# Patient Record
Sex: MALE | Race: WHITE | Employment: OTHER | URBAN - NONMETROPOLITAN AREA
[De-identification: names, ages, dates, MRNs, and addresses within clinical notes are randomized per-mention and may not be internally consistent; named-entity substitution may affect disease eponyms.]

---

## 2017-02-20 ENCOUNTER — TELEPHONE (OUTPATIENT)
Dept: UROLOGY | Age: 67
End: 2017-02-20

## 2017-02-22 ENCOUNTER — HOSPITAL ENCOUNTER (OUTPATIENT)
Dept: CT IMAGING | Age: 67
Discharge: HOME | End: 2017-02-22
Payer: MEDICARE

## 2017-02-22 DIAGNOSIS — N20.0 KIDNEY STONE: ICD-10-CM

## 2017-02-22 DIAGNOSIS — N28.1 RENAL CYST, ACQUIRED: ICD-10-CM

## 2017-02-22 LAB
GFR NON-AFRICAN AMERICAN: >60
PERFORMED ON: NORMAL
POC CREATININE: 1.1 MG/DL (ref 0.3–1.3)
POC SAMPLE TYPE: NORMAL

## 2017-02-22 PROCEDURE — 74178 CT ABD&PLV WO CNTR FLWD CNTR: CPT

## 2017-02-22 PROCEDURE — 6360000004 HC RX CONTRAST MEDICATION: Performed by: UROLOGY

## 2017-02-22 PROCEDURE — 82565 ASSAY OF CREATININE: CPT

## 2017-02-22 RX ADMIN — IOPAMIDOL 75 ML: 755 INJECTION, SOLUTION INTRAVENOUS at 09:30

## 2017-03-01 ENCOUNTER — OFFICE VISIT (OUTPATIENT)
Dept: UROLOGY | Age: 67
End: 2017-03-01
Payer: MEDICARE

## 2017-03-01 VITALS
OXYGEN SATURATION: 96 % | TEMPERATURE: 98.1 F | WEIGHT: 218 LBS | HEART RATE: 78 BPM | SYSTOLIC BLOOD PRESSURE: 126 MMHG | DIASTOLIC BLOOD PRESSURE: 66 MMHG | BODY MASS INDEX: 29.53 KG/M2 | HEIGHT: 72 IN

## 2017-03-01 DIAGNOSIS — N28.1 HYPERDENSE RENAL CYST: ICD-10-CM

## 2017-03-01 DIAGNOSIS — N40.0 BENIGN NON-NODULAR PROSTATIC HYPERPLASIA WITHOUT LOWER URINARY TRACT SYMPTOMS: ICD-10-CM

## 2017-03-01 DIAGNOSIS — N28.1 RENAL CYSTS, ACQUIRED, BILATERAL: Primary | ICD-10-CM

## 2017-03-01 LAB — PROSTATE SPECIFIC ANTIGEN: 2.09 NG/ML (ref 0–4)

## 2017-03-01 PROCEDURE — 99214 OFFICE O/P EST MOD 30 MIN: CPT | Performed by: UROLOGY

## 2017-03-01 ASSESSMENT — ENCOUNTER SYMPTOMS
SORE THROAT: 0
NAUSEA: 0
HEARTBURN: 0
BLURRED VISION: 0
SHORTNESS OF BREATH: 0
DOUBLE VISION: 0
WHEEZING: 0

## 2017-03-10 ENCOUNTER — OFFICE VISIT (OUTPATIENT)
Dept: RETAIL CLINIC | Facility: CLINIC | Age: 67
End: 2017-03-10

## 2017-03-10 VITALS
WEIGHT: 218 LBS | TEMPERATURE: 98.8 F | RESPIRATION RATE: 20 BRPM | SYSTOLIC BLOOD PRESSURE: 116 MMHG | HEART RATE: 78 BPM | OXYGEN SATURATION: 99 % | DIASTOLIC BLOOD PRESSURE: 75 MMHG | BODY MASS INDEX: 29.53 KG/M2 | HEIGHT: 72 IN

## 2017-03-10 DIAGNOSIS — J01.40 ACUTE PANSINUSITIS, RECURRENCE NOT SPECIFIED: Primary | ICD-10-CM

## 2017-03-10 PROCEDURE — 99203 OFFICE O/P NEW LOW 30 MIN: CPT | Performed by: NURSE PRACTITIONER

## 2017-03-10 RX ORDER — BROMPHENIRAMINE MALEATE, PSEUDOEPHEDRINE HYDROCHLORIDE, AND DEXTROMETHORPHAN HYDROBROMIDE 2; 30; 10 MG/5ML; MG/5ML; MG/5ML
5 SYRUP ORAL 4 TIMES DAILY PRN
Qty: 118 ML | Refills: 0 | Status: SHIPPED | OUTPATIENT
Start: 2017-03-10 | End: 2018-08-27

## 2017-03-10 RX ORDER — AMOXICILLIN AND CLAVULANATE POTASSIUM 875; 125 MG/1; MG/1
1 TABLET, FILM COATED ORAL 2 TIMES DAILY
Qty: 20 TABLET | Refills: 0 | Status: SHIPPED | OUTPATIENT
Start: 2017-03-10 | End: 2017-03-20

## 2017-03-10 NOTE — PROGRESS NOTES
"  Chief Complaint   Patient presents with   • Sinusitis   • Cough     Subjective   Rasheed Arevalo is a 66 y.o. male who presents to the clinic today with complaints sinus symptoms, cough and sore throat since Sunday. He has been taking Benadryl, Robitussin and Flonase. He uses CPAP which usually \"keeps me open.\" His throat is sore in am and when he is coughing. His cough is productive with dark green secretions. He reports no wheezing or shortness or breath, but he has been sedentary. He had fever on Monday night low grade of 99-99.3. He has significant fatigue.       The following portions of the patient's history were reviewed and updated as appropriate: allergies, past family history, past medical history, past social history, past surgical history and problem list.      Current Outpatient Prescriptions:   •  Nutritional Supplements (NUTRITIONAL SUPPLEMENT PO), Take  by mouth., Disp: , Rfl:   •  amoxicillin-clavulanate (AUGMENTIN) 875-125 MG per tablet, Take 1 tablet by mouth 2 (Two) Times a Day for 10 days., Disp: 20 tablet, Rfl: 0  •  brompheniramine-pseudoephedrine-DM 30-2-10 MG/5ML syrup, Take 5 mL by mouth 4 (Four) Times a Day As Needed for congestion, cough or Allergies., Disp: 118 mL, Rfl: 0  Allergies:  Review of patient's allergies indicates no known allergies.  Past Medical History   Diagnosis Date   • Gallbladder abscess    • Kidney stone    • Pneumonia 2015   • Sinusitis      Past Surgical History   Procedure Laterality Date   • Cholecystectomy     • Orif hip fracture       History reviewed. No pertinent family history.  Social History   Substance Use Topics   • Smoking status: Former Smoker   • Smokeless tobacco: Never Used   • Alcohol use No       Review of Systems  Review of Systems   Constitutional: Positive for fatigue.   HENT: Positive for postnasal drip, rhinorrhea, sinus pressure, sneezing and sore throat.    Eyes: Negative.    Respiratory: Positive for cough.    Cardiovascular: Negative.  " "  Gastrointestinal: Negative.    Endocrine: Negative.    Genitourinary: Negative.    Musculoskeletal: Negative.    Skin: Negative.    Allergic/Immunologic: Negative.    Neurological: Positive for headaches.   Hematological: Negative.    Psychiatric/Behavioral: Negative.        Objective   Visit Vitals   • /75 (BP Location: Left arm, Patient Position: Sitting, Cuff Size: Adult)   • Pulse 78   • Temp 98.8 °F (37.1 °C) (Temporal Artery )   • Resp 20   • Ht 72\" (182.9 cm)   • Wt 218 lb (98.9 kg)   • SpO2 99%   • BMI 29.57 kg/m2     Last 2 weights    03/10/17  1249   Weight: 218 lb (98.9 kg)       Physical Exam   Constitutional: He is oriented to person, place, and time. He appears well-developed and well-nourished.   HENT:   Head: Normocephalic and atraumatic.   Right Ear: Hearing, tympanic membrane, external ear and ear canal normal.   Left Ear: Hearing, tympanic membrane, external ear and ear canal normal.   Nose: Rhinorrhea present. Right sinus exhibits maxillary sinus tenderness and frontal sinus tenderness. Left sinus exhibits maxillary sinus tenderness and frontal sinus tenderness.   Mouth/Throat: Uvula is midline and mucous membranes are normal. Posterior oropharyngeal erythema present. Tonsils are 1+ on the right. Tonsils are 1+ on the left. No tonsillar exudate.   Eyes: Pupils are equal, round, and reactive to light.   Neck: Normal range of motion. Neck supple.   Cardiovascular: Normal rate, regular rhythm and normal heart sounds.    Pulmonary/Chest: Effort normal and breath sounds normal. No stridor. No respiratory distress. He has no wheezes. He has no rales. He exhibits no tenderness.   Musculoskeletal: Normal range of motion.   Lymphadenopathy:     He has no cervical adenopathy.   Neurological: He is alert and oriented to person, place, and time.   Skin: Skin is warm and dry.   Psychiatric: He has a normal mood and affect. His behavior is normal.   Vitals reviewed.      Assessment/Plan     Rasheed was " seen today for sinusitis and cough.    Diagnoses and all orders for this visit:    Acute pansinusitis, recurrence not specified    Other orders  -     amoxicillin-clavulanate (AUGMENTIN) 875-125 MG per tablet; Take 1 tablet by mouth 2 (Two) Times a Day for 10 days.  -     brompheniramine-pseudoephedrine-DM 30-2-10 MG/5ML syrup; Take 5 mL by mouth 4 (Four) Times a Day As Needed for congestion, cough or Allergies.      Drink plenty of fluids. Continue CPAP with humidified air. Follow up if symptoms do not improve or worsen.     Stop Benadryl and Robitussin

## 2017-03-10 NOTE — PATIENT INSTRUCTIONS

## 2018-02-17 ENCOUNTER — OFFICE VISIT (OUTPATIENT)
Dept: RETAIL CLINIC | Facility: CLINIC | Age: 68
End: 2018-02-17

## 2018-02-17 VITALS
HEART RATE: 88 BPM | SYSTOLIC BLOOD PRESSURE: 118 MMHG | DIASTOLIC BLOOD PRESSURE: 75 MMHG | TEMPERATURE: 100.6 F | OXYGEN SATURATION: 99 %

## 2018-02-17 DIAGNOSIS — J10.1 INFLUENZA B: Primary | ICD-10-CM

## 2018-02-17 LAB
EXPIRATION DATE: ABNORMAL
FLUAV AG NPH QL: NEGATIVE
FLUBV AG NPH QL: POSITIVE
INTERNAL CONTROL: ABNORMAL
Lab: ABNORMAL

## 2018-02-17 PROCEDURE — 99213 OFFICE O/P EST LOW 20 MIN: CPT | Performed by: NURSE PRACTITIONER

## 2018-02-17 PROCEDURE — 87804 INFLUENZA ASSAY W/OPTIC: CPT | Performed by: NURSE PRACTITIONER

## 2018-02-17 RX ORDER — DEXTROMETHORPHAN HYDROBROMIDE AND PROMETHAZINE HYDROCHLORIDE 15; 6.25 MG/5ML; MG/5ML
5 SYRUP ORAL 4 TIMES DAILY PRN
Qty: 118 ML | Refills: 0 | Status: SHIPPED | OUTPATIENT
Start: 2018-02-17 | End: 2018-08-27

## 2018-02-17 NOTE — PROGRESS NOTES
Subjective   Rasheed Arevalo is a 67 y.o. male.     Flu Symptoms   This is a new problem. The current episode started in the past 7 days (Started coughing some on Monday night; symptoms significantly worsened on Wed. ). The problem has been gradually worsening. Associated symptoms include chest pain (Front and back), congestion, coughing, a fever (More at night than during the day; 100.5-101.6), myalgias (Initially; shoulder, neck, back.  Has improved some) and weakness. Pertinent negatives include no nausea or vomiting. Exacerbated by: Not sleeping well. Treatments tried: Robitussin; throat lozenges.    Patient states he didn't feel bad Monday or Tuesday but Wednesday he really felt bad.      The following portions of the patient's history were reviewed and updated as appropriate: allergies, current medications, past family history, past medical history, past social history, past surgical history and problem list.    Review of Systems   Constitutional: Positive for fever (More at night than during the day; 100.5-101.6).   HENT: Positive for congestion.    Eyes: Negative.    Respiratory: Positive for cough.    Cardiovascular: Positive for chest pain (Front and back).   Gastrointestinal: Negative for diarrhea, nausea and vomiting.   Musculoskeletal: Positive for myalgias (Initially; shoulder, neck, back.  Has improved some).   Neurological: Positive for dizziness and weakness.       Objective   Physical Exam   Constitutional: He appears well-developed and well-nourished. He appears ill (Looks like he doesn't feel well). No distress.   HENT:   Right Ear: External ear normal. Tympanic membrane is not erythematous.   Left Ear: External ear normal. Tympanic membrane is not erythematous.   Nose: Right sinus exhibits no maxillary sinus tenderness and no frontal sinus tenderness. Left sinus exhibits no maxillary sinus tenderness and no frontal sinus tenderness.   Mouth/Throat: Posterior oropharyngeal erythema present. No  oropharyngeal exudate.   Neck: Neck supple.   Cardiovascular: Normal rate, regular rhythm and normal heart sounds.  Exam reveals no gallop and no friction rub.    No murmur heard.  Pulmonary/Chest: Effort normal and breath sounds normal. No respiratory distress. He has no decreased breath sounds. He has no wheezes. He has no rhonchi. He has no rales. He exhibits no tenderness (None currently; none with palpation.  Tenderness to chest and back with deep breath).   Lymphadenopathy:     He has cervical adenopathy (Mild to anterior cervical nodes).   Neurological: He is alert.   Skin: Skin is warm and dry. He is not diaphoretic.   Psychiatric: He has a normal mood and affect. His behavior is normal.       Assessment/Plan   Rasheed was seen today for flu symptoms.    Diagnoses and all orders for this visit:    Influenza B  -     promethazine-dextromethorphan (PROMETHAZINE-DM) 6.25-15 MG/5ML syrup; Take 5 mL by mouth 4 (Four) Times a Day As Needed for Cough.  -     POC Influenza A / B    Flu positive  Too late for Tamiflu to be beneficial.  Will send cough medication to help patient rest at night.  Advised to avoid over suppression of cough.  Lungs are clear today. Patient to monitor for worsening continued fever, worsening cough, shortness of breath, wheezing, or other resp concerns and follow up for recheck if these occur.       Patient reported episode of sternal chest pain that occurred at night.  Described pain as building like a kidney stone pain then went away.  He denies any other concerns with this, stated he wasn't short of breath.  Denies chest pressure, arm pain, or jaw pain.  Denies any symptoms related to this during visit other than some discomfort with deep breath and cough.  Instructed to go to ER if chest pain returned    Rest!!!  Increase fluid intake  Warm salt water gargles as needed for sore throat  Do not over suppress cough  If no improvement over next 2-3 days or symptoms worsen, follow up with  PCP

## 2018-02-17 NOTE — PATIENT INSTRUCTIONS
Rest!!!  Increase fluid intake  Warm salt water gargles as needed for sore throat  Do not over suppress cough  If no improvement over next 2-3 days or symptoms worsen, follow up with PCP      Influenza, Adult  Influenza, more commonly known as “the flu,” is a viral infection that primarily affects the respiratory tract. The respiratory tract includes organs that help you breathe, such as the lungs, nose, and throat. The flu causes many common cold symptoms, as well as a high fever and body aches.  The flu spreads easily from person to person (is contagious). Getting a flu shot (influenza vaccination) every year is the best way to prevent influenza.  What are the causes?  Influenza is caused by a virus. You can catch the virus by:  · Breathing in droplets from an infected person's cough or sneeze.  · Touching something that was recently contaminated with the virus and then touching your mouth, nose, or eyes.  What increases the risk?  The following factors may make you more likely to get the flu:  · Not cleaning your hands frequently with soap and water or alcohol-based hand .  · Having close contact with many people during cold and flu season.  · Touching your mouth, eyes, or nose without washing or sanitizing your hands first.  · Not drinking enough fluids or not eating a healthy diet.  · Not getting enough sleep or exercise.  · Being under a high amount of stress.  · Not getting a yearly (annual) flu shot.  You may be at a higher risk of complications from the flu, such as a severe lung infection (pneumonia), if you:  · Are over the age of 65.  · Are pregnant.  · Have a weakened disease-fighting system (immune system). You may have a weakened immune system if you:  ¨ Have HIV or AIDS.  ¨ Are undergoing chemotherapy.  ¨ Are taking medicines that reduce the activity of (suppress) the immune system.  · Have a long-term (chronic) illness, such as heart disease, kidney disease, diabetes, or lung  disease.  · Have a liver disorder.  · Are obese.  · Have anemia.  What are the signs or symptoms?  Symptoms of this condition typically last 4-10 days and may include:  · Fever.  · Chills.  · Headache, body aches, or muscle aches.  · Sore throat.  · Cough.  · Runny or congested nose.  · Chest discomfort and cough.  · Poor appetite.  · Weakness or tiredness (fatigue).  · Dizziness.  · Nausea or vomiting.  How is this diagnosed?  This condition may be diagnosed based on your medical history and a physical exam. Your health care provider may do a nose or throat swab test to confirm the diagnosis.  How is this treated?  If influenza is detected early, you can be treated with antiviral medicine that can reduce the length of your illness and the severity of your symptoms. This medicine may be given by mouth (orally) or through an IV tube that is inserted in one of your veins.  The goal of treatment is to relieve symptoms by taking care of yourself at home. This may include taking over-the-counter medicines, drinking plenty of fluids, and adding humidity to the air in your home.  In some cases, influenza goes away on its own. Severe influenza or complications from influenza may be treated in a hospital.  Follow these instructions at home:  · Take over-the-counter and prescription medicines only as told by your health care provider.  · Use a cool mist humidifier to add humidity to the air in your home. This can make breathing easier.  · Rest as needed.  · Drink enough fluid to keep your urine clear or pale yellow.  · Cover your mouth and nose when you cough or sneeze.  · Wash your hands with soap and water often, especially after you cough or sneeze. If soap and water are not available, use hand .  · Stay home from work or school as told by your health care provider. Unless you are visiting your health care provider, try to avoid leaving home until your fever has been gone for 24 hours without the use of  medicine.  · Keep all follow-up visits as told by your health care provider. This is important.  How is this prevented?  · Getting an annual flu shot is the best way to avoid getting the flu. You may get the flu shot in late summer, fall, or winter. Ask your health care provider when you should get your flu shot.  · Wash your hands often or use hand  often.  · Avoid contact with people who are sick during cold and flu season.  · Eat a healthy diet, drink plenty of fluids, get enough sleep, and exercise regularly.  Contact a health care provider if:  · You develop new symptoms.  · You have:  ¨ Chest pain.  ¨ Diarrhea.  ¨ A fever.  · Your cough gets worse.  · You produce more mucus.  · You feel nauseous or you vomit.  Get help right away if:  · You develop shortness of breath or difficulty breathing.  · Your skin or nails turn a bluish color.  · You have severe pain or stiffness in your neck.  · You develop a sudden headache or sudden pain in your face or ear.  · You cannot stop vomiting.  This information is not intended to replace advice given to you by your health care provider. Make sure you discuss any questions you have with your health care provider.  Document Released: 12/15/2001 Document Revised: 05/25/2017 Document Reviewed: 10/11/2016  [employer] Interactive Patient Education © 2017 [employer]

## 2018-03-23 DIAGNOSIS — Z00.00 ROUTINE GENERAL MEDICAL EXAMINATION AT A HEALTH CARE FACILITY: Primary | ICD-10-CM

## 2018-03-23 DIAGNOSIS — Z00.00 ROUTINE GENERAL MEDICAL EXAMINATION AT A HEALTH CARE FACILITY: ICD-10-CM

## 2018-03-23 LAB
ALBUMIN SERPL-MCNC: 4 G/DL (ref 3.5–5.2)
ALP BLD-CCNC: 49 U/L (ref 40–130)
ALT SERPL-CCNC: 19 U/L (ref 5–41)
ANION GAP SERPL CALCULATED.3IONS-SCNC: 12 MMOL/L (ref 7–19)
AST SERPL-CCNC: 22 U/L (ref 5–40)
BACTERIA: NEGATIVE /HPF
BILIRUB SERPL-MCNC: 0.5 MG/DL (ref 0.2–1.2)
BILIRUBIN URINE: NEGATIVE
BLOOD, URINE: NEGATIVE
BUN BLDV-MCNC: 14 MG/DL (ref 8–23)
CALCIUM SERPL-MCNC: 9 MG/DL (ref 8.8–10.2)
CHLORIDE BLD-SCNC: 103 MMOL/L (ref 98–111)
CHOLESTEROL, TOTAL: 172 MG/DL (ref 160–199)
CLARITY: CLEAR
CO2: 27 MMOL/L (ref 22–29)
COLOR: YELLOW
CREAT SERPL-MCNC: 0.9 MG/DL (ref 0.5–1.2)
EPITHELIAL CELLS, UA: 1 /HPF (ref 0–5)
GFR NON-AFRICAN AMERICAN: >60
GLUCOSE BLD-MCNC: 91 MG/DL (ref 74–109)
GLUCOSE URINE: NEGATIVE MG/DL
HCT VFR BLD CALC: 41.7 % (ref 42–52)
HDLC SERPL-MCNC: 42 MG/DL (ref 55–121)
HEMOGLOBIN: 13.2 G/DL (ref 14–18)
HYALINE CASTS: 3 /HPF (ref 0–8)
KETONES, URINE: NEGATIVE MG/DL
LDL CHOLESTEROL CALCULATED: 92 MG/DL
LEUKOCYTE ESTERASE, URINE: NEGATIVE
MCH RBC QN AUTO: 30.3 PG (ref 27–31)
MCHC RBC AUTO-ENTMCNC: 31.7 G/DL (ref 33–37)
MCV RBC AUTO: 95.6 FL (ref 80–94)
NITRITE, URINE: NEGATIVE
PDW BLD-RTO: 13 % (ref 11.5–14.5)
PH UA: 6.5
PLATELET # BLD: 247 K/UL (ref 130–400)
PMV BLD AUTO: 10.4 FL (ref 9.4–12.4)
POTASSIUM SERPL-SCNC: 4.4 MMOL/L (ref 3.5–5)
PROTEIN UA: NEGATIVE MG/DL
RBC # BLD: 4.36 M/UL (ref 4.7–6.1)
RBC UA: 1 /HPF (ref 0–4)
SODIUM BLD-SCNC: 142 MMOL/L (ref 136–145)
SPECIFIC GRAVITY UA: 1.02
TOTAL PROTEIN: 6.8 G/DL (ref 6.6–8.7)
TRIGL SERPL-MCNC: 189 MG/DL (ref 0–149)
TSH SERPL DL<=0.05 MIU/L-ACNC: 4.04 UIU/ML (ref 0.27–4.2)
UROBILINOGEN, URINE: 0.2 E.U./DL
WBC # BLD: 5.7 K/UL (ref 4.8–10.8)
WBC UA: 2 /HPF (ref 0–5)

## 2018-03-26 ENCOUNTER — TELEPHONE (OUTPATIENT)
Dept: UROLOGY | Age: 68
End: 2018-03-26

## 2018-03-27 ENCOUNTER — TELEPHONE (OUTPATIENT)
Dept: UROLOGY | Age: 68
End: 2018-03-27

## 2018-03-27 NOTE — TELEPHONE ENCOUNTER
Spoke with patient to clarify that he would be needing a CT scan with Renal protocol prior to his appointment with us in May. Patient would like to go ahead and get this scheduled if possible because after he leaves work today he will be out of the country for several weeks. He is available to have the CT Scan done any day at any time other than on Thursday or Friday from 10-2. I told patient that I would let our  know that he wishes to have this scheduled and that someone would contact him with the date and time.

## 2018-04-09 RX ORDER — IBUPROFEN 200 MG
200 TABLET ORAL EVERY 6 HOURS PRN
COMMUNITY
End: 2018-07-23

## 2018-04-10 ENCOUNTER — OFFICE VISIT (OUTPATIENT)
Dept: INTERNAL MEDICINE | Age: 68
End: 2018-04-10
Payer: MEDICARE

## 2018-04-10 VITALS
BODY MASS INDEX: 28.79 KG/M2 | DIASTOLIC BLOOD PRESSURE: 78 MMHG | OXYGEN SATURATION: 98 % | WEIGHT: 212.6 LBS | SYSTOLIC BLOOD PRESSURE: 130 MMHG | HEART RATE: 77 BPM | HEIGHT: 72 IN

## 2018-04-10 DIAGNOSIS — E78.1 HYPERTRIGLYCERIDEMIA: ICD-10-CM

## 2018-04-10 DIAGNOSIS — N28.1 HYPERDENSE RENAL CYST: ICD-10-CM

## 2018-04-10 DIAGNOSIS — J30.89 CHRONIC NON-SEASONAL ALLERGIC RHINITIS, UNSPECIFIED TRIGGER: Chronic | ICD-10-CM

## 2018-04-10 DIAGNOSIS — N40.0 BENIGN NON-NODULAR PROSTATIC HYPERPLASIA WITHOUT LOWER URINARY TRACT SYMPTOMS: Primary | ICD-10-CM

## 2018-04-10 DIAGNOSIS — E78.1 HYPERTRIGLYCERIDEMIA: Chronic | ICD-10-CM

## 2018-04-10 DIAGNOSIS — N28.1 RENAL CYST, ACQUIRED: ICD-10-CM

## 2018-04-10 PROCEDURE — 4040F PNEUMOC VAC/ADMIN/RCVD: CPT | Performed by: INTERNAL MEDICINE

## 2018-04-10 PROCEDURE — G8419 CALC BMI OUT NRM PARAM NOF/U: HCPCS | Performed by: INTERNAL MEDICINE

## 2018-04-10 PROCEDURE — 99212 OFFICE O/P EST SF 10 MIN: CPT | Performed by: INTERNAL MEDICINE

## 2018-04-10 PROCEDURE — G0439 PPPS, SUBSEQ VISIT: HCPCS | Performed by: INTERNAL MEDICINE

## 2018-04-10 PROCEDURE — 1036F TOBACCO NON-USER: CPT | Performed by: INTERNAL MEDICINE

## 2018-04-10 PROCEDURE — 1123F ACP DISCUSS/DSCN MKR DOCD: CPT | Performed by: INTERNAL MEDICINE

## 2018-04-10 PROCEDURE — 3017F COLORECTAL CA SCREEN DOC REV: CPT | Performed by: INTERNAL MEDICINE

## 2018-04-10 PROCEDURE — G8427 DOCREV CUR MEDS BY ELIG CLIN: HCPCS | Performed by: INTERNAL MEDICINE

## 2018-04-10 RX ORDER — TAMSULOSIN HYDROCHLORIDE 0.4 MG/1
0.4 CAPSULE ORAL DAILY
Qty: 30 CAPSULE | Refills: 5 | Status: SHIPPED | OUTPATIENT
Start: 2018-04-10 | End: 2018-07-23

## 2018-04-10 RX ORDER — FLUTICASONE PROPIONATE 50 MCG
SPRAY, SUSPENSION (ML) NASAL
Qty: 1 BOTTLE | Refills: 5 | Status: SHIPPED | OUTPATIENT
Start: 2018-04-10 | End: 2018-07-23

## 2018-04-10 ASSESSMENT — ENCOUNTER SYMPTOMS
ABDOMINAL PAIN: 0
RHINORRHEA: 0
SHORTNESS OF BREATH: 0
SORE THROAT: 0
NAUSEA: 0
COLOR CHANGE: 0
BACK PAIN: 0
COUGH: 0
SINUS PRESSURE: 0
CONSTIPATION: 0
DIARRHEA: 0
TROUBLE SWALLOWING: 0
BLOOD IN STOOL: 0

## 2018-04-10 ASSESSMENT — ANXIETY QUESTIONNAIRES: GAD7 TOTAL SCORE: 0

## 2018-04-10 ASSESSMENT — PATIENT HEALTH QUESTIONNAIRE - PHQ9: SUM OF ALL RESPONSES TO PHQ QUESTIONS 1-9: 0

## 2018-04-10 ASSESSMENT — LIFESTYLE VARIABLES: HOW OFTEN DO YOU HAVE A DRINK CONTAINING ALCOHOL: 0

## 2018-05-07 ENCOUNTER — HOSPITAL ENCOUNTER (OUTPATIENT)
Dept: CT IMAGING | Age: 68
Discharge: HOME | End: 2018-05-07
Payer: MEDICARE

## 2018-05-07 DIAGNOSIS — N40.0 BENIGN NON-NODULAR PROSTATIC HYPERPLASIA WITHOUT LOWER URINARY TRACT SYMPTOMS: ICD-10-CM

## 2018-05-07 DIAGNOSIS — N28.1 ACQUIRED CYST OF KIDNEY: ICD-10-CM

## 2018-05-07 LAB — PROSTATE SPECIFIC ANTIGEN: 2.29 NG/ML (ref 0–4)

## 2018-05-07 PROCEDURE — 6360000004 HC RX CONTRAST MEDICATION: Performed by: UROLOGY

## 2018-05-07 PROCEDURE — 74178 CT ABD&PLV WO CNTR FLWD CNTR: CPT

## 2018-05-07 RX ADMIN — IOPAMIDOL 75 ML: 755 INJECTION, SOLUTION INTRAVENOUS at 09:18

## 2018-05-14 ENCOUNTER — OFFICE VISIT (OUTPATIENT)
Dept: UROLOGY | Age: 68
End: 2018-05-14
Payer: MEDICARE

## 2018-05-14 VITALS
BODY MASS INDEX: 29.53 KG/M2 | WEIGHT: 218 LBS | HEART RATE: 69 BPM | HEIGHT: 72 IN | DIASTOLIC BLOOD PRESSURE: 74 MMHG | SYSTOLIC BLOOD PRESSURE: 119 MMHG | TEMPERATURE: 97.9 F

## 2018-05-14 DIAGNOSIS — N28.1 HYPERDENSE RENAL CYST: Primary | ICD-10-CM

## 2018-05-14 DIAGNOSIS — N40.0 BENIGN NON-NODULAR PROSTATIC HYPERPLASIA WITHOUT LOWER URINARY TRACT SYMPTOMS: ICD-10-CM

## 2018-05-14 LAB
APPEARANCE FLUID: NORMAL
BILIRUBIN, POC: NORMAL
BLOOD URINE, POC: NORMAL
CLARITY, POC: CLEAR
COLOR, POC: YELLOW
GLUCOSE URINE, POC: NORMAL
KETONES, POC: NORMAL
LEUKOCYTE EST, POC: NORMAL
NITRITE, POC: NORMAL
PH, POC: 6
PROTEIN, POC: NORMAL
SPECIFIC GRAVITY, POC: 1.02
UROBILINOGEN, POC: 0.2

## 2018-05-14 PROCEDURE — 1123F ACP DISCUSS/DSCN MKR DOCD: CPT | Performed by: UROLOGY

## 2018-05-14 PROCEDURE — G8427 DOCREV CUR MEDS BY ELIG CLIN: HCPCS | Performed by: UROLOGY

## 2018-05-14 PROCEDURE — G8419 CALC BMI OUT NRM PARAM NOF/U: HCPCS | Performed by: UROLOGY

## 2018-05-14 PROCEDURE — 99214 OFFICE O/P EST MOD 30 MIN: CPT | Performed by: UROLOGY

## 2018-05-14 PROCEDURE — 81003 URINALYSIS AUTO W/O SCOPE: CPT | Performed by: UROLOGY

## 2018-05-14 PROCEDURE — 4040F PNEUMOC VAC/ADMIN/RCVD: CPT | Performed by: UROLOGY

## 2018-05-14 PROCEDURE — 3017F COLORECTAL CA SCREEN DOC REV: CPT | Performed by: UROLOGY

## 2018-05-14 PROCEDURE — 1036F TOBACCO NON-USER: CPT | Performed by: UROLOGY

## 2018-05-14 ASSESSMENT — ENCOUNTER SYMPTOMS
HEARTBURN: 0
BLURRED VISION: 0
DOUBLE VISION: 0
NAUSEA: 0
SHORTNESS OF BREATH: 0
WHEEZING: 0
SORE THROAT: 0

## 2018-07-23 ENCOUNTER — OFFICE VISIT (OUTPATIENT)
Dept: UROLOGY | Age: 68
End: 2018-07-23
Payer: MEDICARE

## 2018-07-23 VITALS — HEIGHT: 72 IN | BODY MASS INDEX: 29.66 KG/M2 | WEIGHT: 219 LBS | TEMPERATURE: 97 F

## 2018-07-23 DIAGNOSIS — N28.1 HYPERDENSE RENAL CYST: ICD-10-CM

## 2018-07-23 DIAGNOSIS — N40.0 BENIGN NON-NODULAR PROSTATIC HYPERPLASIA WITHOUT LOWER URINARY TRACT SYMPTOMS: Primary | ICD-10-CM

## 2018-07-23 DIAGNOSIS — N28.1 RENAL CYSTS, ACQUIRED, BILATERAL: ICD-10-CM

## 2018-07-23 LAB
BILIRUBIN, POC: NORMAL
BLOOD URINE, POC: NORMAL
CLARITY, POC: NORMAL
COLOR, POC: NORMAL
GLUCOSE URINE, POC: NORMAL
KETONES, POC: NORMAL
LEUKOCYTE EST, POC: NORMAL
NITRITE, POC: NORMAL
PH, POC: 6
PROTEIN, POC: NORMAL
SPECIFIC GRAVITY, POC: 1.02
UROBILINOGEN, POC: 0.2

## 2018-07-23 PROCEDURE — G8427 DOCREV CUR MEDS BY ELIG CLIN: HCPCS | Performed by: UROLOGY

## 2018-07-23 PROCEDURE — 81003 URINALYSIS AUTO W/O SCOPE: CPT | Performed by: UROLOGY

## 2018-07-23 PROCEDURE — 1123F ACP DISCUSS/DSCN MKR DOCD: CPT | Performed by: UROLOGY

## 2018-07-23 PROCEDURE — 4040F PNEUMOC VAC/ADMIN/RCVD: CPT | Performed by: UROLOGY

## 2018-07-23 PROCEDURE — 1036F TOBACCO NON-USER: CPT | Performed by: UROLOGY

## 2018-07-23 PROCEDURE — 99214 OFFICE O/P EST MOD 30 MIN: CPT | Performed by: UROLOGY

## 2018-07-23 PROCEDURE — 1101F PT FALLS ASSESS-DOCD LE1/YR: CPT | Performed by: UROLOGY

## 2018-07-23 PROCEDURE — 3017F COLORECTAL CA SCREEN DOC REV: CPT | Performed by: UROLOGY

## 2018-07-23 PROCEDURE — G8419 CALC BMI OUT NRM PARAM NOF/U: HCPCS | Performed by: UROLOGY

## 2018-07-23 ASSESSMENT — ENCOUNTER SYMPTOMS
SHORTNESS OF BREATH: 0
BLURRED VISION: 0
SORE THROAT: 0
HEARTBURN: 0
NAUSEA: 0
WHEEZING: 0
DOUBLE VISION: 0

## 2018-07-23 NOTE — LETTER
[facility]  [address] [place] [address] Drive, [address]  [address] [place] [address] Road  Phone: [phone]  Fax: [phone]    Gillian Agosto MD        July 23, 2018     Deloise Severe, MD  [address] [place] [address] Dr Sandra Pena [address] [place] [id number]      Patient: Clarita Watters   MR Number: [id number]   YOB: 1950   Date of Visit: 7/23/2018       Dear Provider: Thank you for referring Tania Scott to me for evaluation. Below are the relevant portions of my assessment and plan of care. If you have questions, please do not hesitate to call me. I look forward to following Abisai along with you.     Sincerely,        Gillian Agosto MD
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
## 2018-12-05 ENCOUNTER — TELEPHONE (OUTPATIENT)
Dept: INTERNAL MEDICINE | Age: 68
End: 2018-12-05

## 2018-12-05 DIAGNOSIS — N28.1 RENAL CYSTS, ACQUIRED, BILATERAL: Primary | ICD-10-CM

## 2018-12-05 DIAGNOSIS — N28.1 HYPERDENSE RENAL CYST: ICD-10-CM

## 2018-12-05 DIAGNOSIS — N40.0 BENIGN NON-NODULAR PROSTATIC HYPERPLASIA WITHOUT LOWER URINARY TRACT SYMPTOMS: ICD-10-CM

## 2018-12-14 ENCOUNTER — TELEPHONE (OUTPATIENT)
Dept: UROLOGY | Facility: CLINIC | Age: 68
End: 2018-12-14

## 2018-12-14 NOTE — TELEPHONE ENCOUNTER
Dr Callahan's office returned your call and verified that this patient does want to request to switch from Dr Paredes to Dr Pollard and would need an appt sometime in April 2019.

## 2018-12-14 NOTE — TELEPHONE ENCOUNTER
Sent Dr. Pollard records for review. Once he reviews them he will let Aarti know if he will see patient.

## 2018-12-18 NOTE — TELEPHONE ENCOUNTER
Called and left voicemail for patient to return call to set up appt with Dr Pollard. We need to know when his last PSA was done and where.

## 2018-12-18 NOTE — TELEPHONE ENCOUNTER
----- Message -----   From: Yaya Pollard MD   Sent: 12/15/2018  10:48 AM   To: Bryan Sheikh     That's fine   ----- Message -----   From: Bryan Sheikh   Sent: 12/14/2018   4:24 PM   To: Yaya Pollard MD     Patient wants to switch from Dr. Paredes to you. Please review and let me know. Thanks

## 2018-12-31 ENCOUNTER — OFFICE VISIT (OUTPATIENT)
Dept: RETAIL CLINIC | Facility: CLINIC | Age: 68
End: 2018-12-31

## 2018-12-31 VITALS
OXYGEN SATURATION: 96 % | SYSTOLIC BLOOD PRESSURE: 116 MMHG | DIASTOLIC BLOOD PRESSURE: 70 MMHG | HEART RATE: 81 BPM | TEMPERATURE: 98.1 F

## 2018-12-31 DIAGNOSIS — Z23 NEED FOR VACCINATION: ICD-10-CM

## 2018-12-31 DIAGNOSIS — H10.32 ACUTE CONJUNCTIVITIS OF LEFT EYE, UNSPECIFIED ACUTE CONJUNCTIVITIS TYPE: Primary | ICD-10-CM

## 2018-12-31 RX ORDER — TOBRAMYCIN 3 MG/ML
2 SOLUTION/ DROPS OPHTHALMIC
Qty: 3 ML | Refills: 0 | Status: SHIPPED | OUTPATIENT
Start: 2018-12-31 | End: 2019-01-05
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
## 2018-12-31 NOTE — PATIENT INSTRUCTIONS
Claritin-D : may continue this for your runny nose  You are contagious with your eye.  Use good hand washing and try not to touch the eye  If eye symptoms worsen or change, see eye doctor asap!      Bacterial Conjunctivitis  Bacterial conjunctivitis is an infection of your conjunctiva. This is the clear membrane that covers the white part of your eye and the inner surface of your eyelid. This condition can make your eye:  · Red or pink.  · Itchy.    This condition is caused by bacteria. This condition spreads very easily from person to person (is contagious) and from one eye to the other eye.  Follow these instructions at home:  Medicines  · Take or apply your antibiotic medicine as told by your doctor. Do not stop taking or applying the antibiotic even if you start to feel better.  · Take or apply over-the-counter and prescription medicines only as told by your doctor.  · Do not touch your eyelid with the eye drop bottle or the ointment tube.  Managing discomfort  · Wipe any fluid from your eye with a warm, wet washcloth or a cotton ball.  · Place a cool, clean washcloth on your eye. Do this for 10-20 minutes, 3-4 times per day.  General instructions  · Do not wear contact lenses until the irritation is gone. Wear glasses until your doctor says it is okay to wear contacts.  · Do not wear eye makeup until your symptoms are gone. Throw away any old makeup.  · Change or wash your pillowcase every day.  · Do not share towels or washcloths with anyone.  · Wash your hands often with soap and water. Use paper towels to dry your hands.  · Do not touch or rub your eyes.  · Do not drive or use heavy machinery if your vision is blurry.  Contact a doctor if:  · You have a fever.  · Your symptoms do not get better after 10 days.  Get help right away if:  · You have a fever and your symptoms suddenly get worse.  · You have very bad pain when you move your eye.  · Your face:  ? Hurts.  ? Is red.  ? Is swollen.  · You have sudden  loss of vision.  This information is not intended to replace advice given to you by your health care provider. Make sure you discuss any questions you have with your health care provider.  Document Released: 09/26/2009 Document Revised: 05/25/2017 Document Reviewed: 09/29/2016  Elsevier Interactive Patient Education © 2018 [employer]

## 2019-01-30 ENCOUNTER — TELEPHONE (OUTPATIENT)
Dept: UROLOGY | Facility: CLINIC | Age: 69
End: 2019-01-30

## 2019-01-30 NOTE — TELEPHONE ENCOUNTER
This patient is transferring from Dr Paredes. Dr Pollard has said he would accept the patient. The patient is due for his next follow up in April 2019 and states that he usually gets his PSA at Dr Callahan's office and that he also gets at CT A&P w/wo contrast prior to his follow up. Since he is transferring and will be new patient does Dr Pollard want to order the CT or wait to order when he sees him?

## 2019-01-30 NOTE — TELEPHONE ENCOUNTER
Attempted to call patient to set up new patient appt with Dr Pollard for April 2019. Left voicemail for patient to call back. He will need an order for a CT A&P w/wo contrast and a PSA prior to his appt. See prior notes regarding this.

## 2019-01-30 NOTE — TELEPHONE ENCOUNTER
----- Message from Aarti Candelaria sent at 12/19/2018 10:19 AM CST -----      ----- Message -----  From: Comfort Mtz  Sent: 12/19/2018  10:07 AM  To: Aarti Candelaria    More Records from [facility]

## 2019-01-31 ENCOUNTER — APPOINTMENT (OUTPATIENT)
Dept: GENERAL RADIOLOGY | Facility: HOSPITAL | Age: 69
End: 2019-01-31

## 2019-01-31 ENCOUNTER — APPOINTMENT (OUTPATIENT)
Dept: CT IMAGING | Facility: HOSPITAL | Age: 69
End: 2019-01-31

## 2019-01-31 ENCOUNTER — HOSPITAL ENCOUNTER (OUTPATIENT)
Facility: HOSPITAL | Age: 69
Setting detail: OBSERVATION
Discharge: HOME | End: 2019-02-01
Attending: EMERGENCY MEDICINE | Admitting: EMERGENCY MEDICINE

## 2019-01-31 DIAGNOSIS — R27.0 ATAXIA: ICD-10-CM

## 2019-01-31 DIAGNOSIS — I47.20 VENTRICULAR TACHYCARDIA (HCC): Primary | ICD-10-CM

## 2019-01-31 DIAGNOSIS — N13.8 BPH WITH OBSTRUCTION/LOWER URINARY TRACT SYMPTOMS: Primary | ICD-10-CM

## 2019-01-31 DIAGNOSIS — N40.1 BPH WITH OBSTRUCTION/LOWER URINARY TRACT SYMPTOMS: Primary | ICD-10-CM

## 2019-01-31 DIAGNOSIS — N28.89 RENAL MASS: Primary | ICD-10-CM

## 2019-01-31 LAB
ABO GROUP BLD: NORMAL
ALBUMIN SERPL-MCNC: 4.8 G/DL (ref 3.5–5)
ALBUMIN/GLOB SERPL: 1.5 G/DL (ref 1.1–2.5)
ALP SERPL-CCNC: 58 U/L (ref 24–120)
ALT SERPL W P-5'-P-CCNC: 27 U/L (ref 0–54)
ANION GAP SERPL CALCULATED.3IONS-SCNC: 9 MMOL/L (ref 4–13)
APTT PPP: 24.9 SECONDS (ref 24.1–34.8)
AST SERPL-CCNC: 35 U/L (ref 7–45)
BASOPHILS # BLD AUTO: 0.08 10*3/MM3 (ref 0–0.2)
BASOPHILS NFR BLD AUTO: 1.1 % (ref 0–2)
BILIRUB SERPL-MCNC: 0.5 MG/DL (ref 0.1–1)
BLD GP AB SCN SERPL QL: NEGATIVE
BUN BLD-MCNC: 19 MG/DL (ref 5–21)
BUN/CREAT SERPL: 17 (ref 7–25)
CALCIUM SPEC-SCNC: 9 MG/DL (ref 8.4–10.4)
CHLORIDE SERPL-SCNC: 101 MMOL/L (ref 98–110)
CO2 SERPL-SCNC: 30 MMOL/L (ref 24–31)
CREAT BLD-MCNC: 1.12 MG/DL (ref 0.5–1.4)
DEPRECATED RDW RBC AUTO: 40.8 FL (ref 40–54)
EOSINOPHIL # BLD AUTO: 0.23 10*3/MM3 (ref 0–0.7)
EOSINOPHIL NFR BLD AUTO: 3.1 % (ref 0–4)
ERYTHROCYTE [DISTWIDTH] IN BLOOD BY AUTOMATED COUNT: 12.5 % (ref 12–15)
GFR SERPL CREATININE-BSD FRML MDRD: 65 ML/MIN/1.73
GLOBULIN UR ELPH-MCNC: 3.1 GM/DL
GLUCOSE BLD-MCNC: 100 MG/DL (ref 70–100)
GLUCOSE BLDC GLUCOMTR-MCNC: 94 MG/DL (ref 70–130)
HBA1C MFR BLD: 5.4 %
HCT VFR BLD AUTO: 42 % (ref 40–52)
HGB BLD-MCNC: 14.3 G/DL (ref 14–18)
HOLD SPECIMEN: NORMAL
HOLD SPECIMEN: NORMAL
IMM GRANULOCYTES # BLD AUTO: 0.02 10*3/MM3 (ref 0–0.03)
IMM GRANULOCYTES NFR BLD AUTO: 0.3 % (ref 0–5)
INR PPP: 0.94 (ref 0.91–1.09)
LYMPHOCYTES # BLD AUTO: 3.78 10*3/MM3 (ref 0.72–4.86)
LYMPHOCYTES NFR BLD AUTO: 50.2 % (ref 15–45)
MAGNESIUM SERPL-MCNC: 2.3 MG/DL (ref 1.4–2.2)
MCH RBC QN AUTO: 30.5 PG (ref 28–32)
MCHC RBC AUTO-ENTMCNC: 34 G/DL (ref 33–36)
MCV RBC AUTO: 89.6 FL (ref 82–95)
MONOCYTES # BLD AUTO: 0.71 10*3/MM3 (ref 0.19–1.3)
MONOCYTES NFR BLD AUTO: 9.4 % (ref 4–12)
NEUTROPHILS # BLD AUTO: 2.71 10*3/MM3 (ref 1.87–8.4)
NEUTROPHILS NFR BLD AUTO: 35.9 % (ref 39–78)
NRBC BLD AUTO-RTO: 0 /100 WBC (ref 0–0)
PLATELET # BLD AUTO: 277 10*3/MM3 (ref 130–400)
PMV BLD AUTO: 10.1 FL (ref 6–12)
POTASSIUM BLD-SCNC: 3.5 MMOL/L (ref 3.5–5.3)
PROT SERPL-MCNC: 7.9 G/DL (ref 6.3–8.7)
PROTHROMBIN TIME: 12.8 SECONDS (ref 11.9–14.6)
RBC # BLD AUTO: 4.69 10*6/MM3 (ref 4.8–5.9)
RH BLD: NEGATIVE
SODIUM BLD-SCNC: 140 MMOL/L (ref 135–145)
T&S EXPIRATION DATE: NORMAL
TROPONIN I SERPL-MCNC: <0.012 NG/ML (ref 0–0.03)
TSH SERPL DL<=0.05 MIU/L-ACNC: 3.42 MIU/ML (ref 0.47–4.68)
WBC NRBC COR # BLD: 7.53 10*3/MM3 (ref 4.8–10.8)
WHOLE BLOOD HOLD SPECIMEN: NORMAL
WHOLE BLOOD HOLD SPECIMEN: NORMAL

## 2019-01-31 PROCEDURE — 86850 RBC ANTIBODY SCREEN: CPT | Performed by: EMERGENCY MEDICINE

## 2019-01-31 PROCEDURE — 86901 BLOOD TYPING SEROLOGIC RH(D): CPT | Performed by: EMERGENCY MEDICINE

## 2019-01-31 PROCEDURE — G0378 HOSPITAL OBSERVATION PER HR: HCPCS

## 2019-01-31 PROCEDURE — 99285 EMERGENCY DEPT VISIT HI MDM: CPT

## 2019-01-31 PROCEDURE — 85610 PROTHROMBIN TIME: CPT | Performed by: EMERGENCY MEDICINE

## 2019-01-31 PROCEDURE — 84443 ASSAY THYROID STIM HORMONE: CPT | Performed by: PSYCHIATRY & NEUROLOGY

## 2019-01-31 PROCEDURE — 80053 COMPREHEN METABOLIC PANEL: CPT | Performed by: EMERGENCY MEDICINE

## 2019-01-31 PROCEDURE — 96375 TX/PRO/DX INJ NEW DRUG ADDON: CPT

## 2019-01-31 PROCEDURE — 25010000002 AMIODARONE IN DEXTROSE 5% 360-4.14 MG/200ML-% SOLUTION: Performed by: EMERGENCY MEDICINE

## 2019-01-31 PROCEDURE — 84484 ASSAY OF TROPONIN QUANT: CPT | Performed by: EMERGENCY MEDICINE

## 2019-01-31 PROCEDURE — 94799 UNLISTED PULMONARY SVC/PX: CPT

## 2019-01-31 PROCEDURE — 85025 COMPLETE CBC W/AUTO DIFF WBC: CPT | Performed by: EMERGENCY MEDICINE

## 2019-01-31 PROCEDURE — 86900 BLOOD TYPING SEROLOGIC ABO: CPT | Performed by: EMERGENCY MEDICINE

## 2019-01-31 PROCEDURE — 70450 CT HEAD/BRAIN W/O DYE: CPT

## 2019-01-31 PROCEDURE — 83735 ASSAY OF MAGNESIUM: CPT | Performed by: EMERGENCY MEDICINE

## 2019-01-31 PROCEDURE — 83036 HEMOGLOBIN GLYCOSYLATED A1C: CPT | Performed by: PSYCHIATRY & NEUROLOGY

## 2019-01-31 PROCEDURE — 25010000002 ONDANSETRON PER 1 MG: Performed by: EMERGENCY MEDICINE

## 2019-01-31 PROCEDURE — 99205 OFFICE O/P NEW HI 60 MIN: CPT | Performed by: PSYCHIATRY & NEUROLOGY

## 2019-01-31 PROCEDURE — 94760 N-INVAS EAR/PLS OXIMETRY 1: CPT

## 2019-01-31 PROCEDURE — 96365 THER/PROPH/DIAG IV INF INIT: CPT

## 2019-01-31 PROCEDURE — 96366 THER/PROPH/DIAG IV INF ADDON: CPT

## 2019-01-31 PROCEDURE — 85730 THROMBOPLASTIN TIME PARTIAL: CPT | Performed by: EMERGENCY MEDICINE

## 2019-01-31 PROCEDURE — 93005 ELECTROCARDIOGRAM TRACING: CPT | Performed by: EMERGENCY MEDICINE

## 2019-01-31 PROCEDURE — 82962 GLUCOSE BLOOD TEST: CPT

## 2019-01-31 PROCEDURE — 93010 ELECTROCARDIOGRAM REPORT: CPT | Performed by: INTERNAL MEDICINE

## 2019-01-31 RX ORDER — SODIUM CHLORIDE 0.9 % (FLUSH) 0.9 %
3 SYRINGE (ML) INJECTION EVERY 12 HOURS SCHEDULED
Status: DISCONTINUED | OUTPATIENT
Start: 2019-01-31 | End: 2019-02-01 | Stop reason: HOSPADM

## 2019-01-31 RX ORDER — ONDANSETRON 4 MG/1
4 TABLET, FILM COATED ORAL EVERY 6 HOURS PRN
Status: DISCONTINUED | OUTPATIENT
Start: 2019-01-31 | End: 2019-02-01 | Stop reason: HOSPADM

## 2019-01-31 RX ORDER — LABETALOL HYDROCHLORIDE 5 MG/ML
10 INJECTION, SOLUTION INTRAVENOUS
Status: DISCONTINUED | OUTPATIENT
Start: 2019-01-31 | End: 2019-02-01 | Stop reason: HOSPADM

## 2019-01-31 RX ORDER — ATORVASTATIN CALCIUM 40 MG/1
80 TABLET, FILM COATED ORAL NIGHTLY
Status: DISCONTINUED | OUTPATIENT
Start: 2019-01-31 | End: 2019-02-01 | Stop reason: HOSPADM

## 2019-01-31 RX ORDER — ASPIRIN 300 MG/1
300 SUPPOSITORY RECTAL DAILY
Status: DISCONTINUED | OUTPATIENT
Start: 2019-01-31 | End: 2019-02-01 | Stop reason: HOSPADM

## 2019-01-31 RX ORDER — ONDANSETRON 2 MG/ML
4 INJECTION INTRAMUSCULAR; INTRAVENOUS EVERY 6 HOURS PRN
Status: DISCONTINUED | OUTPATIENT
Start: 2019-01-31 | End: 2019-02-01 | Stop reason: HOSPADM

## 2019-01-31 RX ORDER — SODIUM CHLORIDE 0.9 % (FLUSH) 0.9 %
10 SYRINGE (ML) INJECTION AS NEEDED
Status: DISCONTINUED | OUTPATIENT
Start: 2019-01-31 | End: 2019-02-01 | Stop reason: HOSPADM

## 2019-01-31 RX ORDER — ONDANSETRON 4 MG/1
4 TABLET, ORALLY DISINTEGRATING ORAL EVERY 6 HOURS PRN
Status: DISCONTINUED | OUTPATIENT
Start: 2019-01-31 | End: 2019-02-01 | Stop reason: HOSPADM

## 2019-01-31 RX ORDER — ASPIRIN 81 MG/1
81 TABLET, CHEWABLE ORAL DAILY
Status: DISCONTINUED | OUTPATIENT
Start: 2019-01-31 | End: 2019-02-01 | Stop reason: HOSPADM

## 2019-01-31 RX ORDER — POTASSIUM CHLORIDE 750 MG/1
40 CAPSULE, EXTENDED RELEASE ORAL ONCE
Status: COMPLETED | OUTPATIENT
Start: 2019-01-31 | End: 2019-01-31

## 2019-01-31 RX ORDER — SODIUM CHLORIDE 0.9 % (FLUSH) 0.9 %
3-10 SYRINGE (ML) INJECTION AS NEEDED
Status: DISCONTINUED | OUTPATIENT
Start: 2019-01-31 | End: 2019-02-01 | Stop reason: HOSPADM

## 2019-01-31 RX ORDER — ONDANSETRON 2 MG/ML
4 INJECTION INTRAMUSCULAR; INTRAVENOUS ONCE
Status: COMPLETED | OUTPATIENT
Start: 2019-01-31 | End: 2019-01-31

## 2019-01-31 RX ORDER — ACETAMINOPHEN 325 MG/1
650 TABLET ORAL EVERY 4 HOURS PRN
Status: DISCONTINUED | OUTPATIENT
Start: 2019-01-31 | End: 2019-02-01 | Stop reason: HOSPADM

## 2019-01-31 RX ADMIN — DESMOPRESSIN ACETATE 80 MG: 0.2 TABLET ORAL at 20:45

## 2019-01-31 RX ADMIN — ONDANSETRON 4 MG: 2 INJECTION INTRAMUSCULAR; INTRAVENOUS at 17:34

## 2019-01-31 RX ADMIN — ASPIRIN 81 MG 81 MG: 81 TABLET ORAL at 20:44

## 2019-01-31 RX ADMIN — POTASSIUM CHLORIDE 40 MEQ: 750 CAPSULE, EXTENDED RELEASE ORAL at 21:46

## 2019-01-31 RX ADMIN — AMIODARONE HYDROCHLORIDE 0.5 MG/MIN: 1.8 INJECTION, SOLUTION INTRAVENOUS at 17:48

## 2019-01-31 NOTE — TELEPHONE ENCOUNTER
Patient returned call and I have set up an appt with Dr Pollard for 4/25/19. He may get his PSA at his PCP.

## 2019-02-01 ENCOUNTER — APPOINTMENT (OUTPATIENT)
Dept: CARDIOLOGY | Facility: HOSPITAL | Age: 69
End: 2019-02-01

## 2019-02-01 ENCOUNTER — APPOINTMENT (OUTPATIENT)
Dept: MRI IMAGING | Facility: HOSPITAL | Age: 69
End: 2019-02-01

## 2019-02-01 ENCOUNTER — APPOINTMENT (OUTPATIENT)
Dept: ULTRASOUND IMAGING | Facility: HOSPITAL | Age: 69
End: 2019-02-01

## 2019-02-01 VITALS
RESPIRATION RATE: 18 BRPM | WEIGHT: 216 LBS | BODY MASS INDEX: 29.26 KG/M2 | OXYGEN SATURATION: 95 % | HEART RATE: 80 BPM | DIASTOLIC BLOOD PRESSURE: 77 MMHG | HEIGHT: 72 IN | SYSTOLIC BLOOD PRESSURE: 134 MMHG | TEMPERATURE: 97.6 F

## 2019-02-01 PROBLEM — I47.20 VENTRICULAR TACHYCARDIA: Status: RESOLVED | Noted: 2019-01-31 | Resolved: 2019-02-01

## 2019-02-01 PROBLEM — R27.0 ATAXIA: Status: ACTIVE | Noted: 2019-02-01

## 2019-02-01 PROBLEM — E78.5 HYPERLIPIDEMIA: Status: ACTIVE | Noted: 2019-02-01

## 2019-02-01 LAB
ANION GAP SERPL CALCULATED.3IONS-SCNC: 8 MMOL/L (ref 4–13)
ARTICHOKE IGE QN: 132 MG/DL (ref 0–99)
BH CV ECHO MEAS - AO MAX PG (FULL): 1.8 MMHG
BH CV ECHO MEAS - AO MAX PG: 5.9 MMHG
BH CV ECHO MEAS - AO MEAN PG (FULL): 1 MMHG
BH CV ECHO MEAS - AO MEAN PG: 3 MMHG
BH CV ECHO MEAS - AO ROOT AREA (BSA CORRECTED): 1.6
BH CV ECHO MEAS - AO ROOT AREA: 10.2 CM^2
BH CV ECHO MEAS - AO ROOT DIAM: 3.6 CM
BH CV ECHO MEAS - AO V2 MAX: 121 CM/SEC
BH CV ECHO MEAS - AO V2 MEAN: 86.6 CM/SEC
BH CV ECHO MEAS - AO V2 VTI: 28.3 CM
BH CV ECHO MEAS - AVA(I,A): 2.8 CM^2
BH CV ECHO MEAS - AVA(I,D): 2.8 CM^2
BH CV ECHO MEAS - AVA(V,A): 2.6 CM^2
BH CV ECHO MEAS - AVA(V,D): 2.6 CM^2
BH CV ECHO MEAS - BSA(HAYCOCK): 2.3 M^2
BH CV ECHO MEAS - BSA: 2.2 M^2
BH CV ECHO MEAS - BZI_BMI: 29.3 KILOGRAMS/M^2
BH CV ECHO MEAS - BZI_METRIC_HEIGHT: 182.9 CM
BH CV ECHO MEAS - BZI_METRIC_WEIGHT: 98 KG
BH CV ECHO MEAS - EDV(CUBED): 117.6 ML
BH CV ECHO MEAS - EDV(MOD-SP4): 68.7 ML
BH CV ECHO MEAS - EDV(TEICH): 112.8 ML
BH CV ECHO MEAS - EF(CUBED): 69.5 %
BH CV ECHO MEAS - EF(MOD-SP4): 64.6 %
BH CV ECHO MEAS - EF(TEICH): 60.9 %
BH CV ECHO MEAS - ESV(CUBED): 35.9 ML
BH CV ECHO MEAS - ESV(MOD-SP4): 24.3 ML
BH CV ECHO MEAS - ESV(TEICH): 44.1 ML
BH CV ECHO MEAS - FS: 32.7 %
BH CV ECHO MEAS - IVS/LVPW: 1.2
BH CV ECHO MEAS - IVSD: 1.1 CM
BH CV ECHO MEAS - LA DIMENSION: 3.4 CM
BH CV ECHO MEAS - LA/AO: 0.94
BH CV ECHO MEAS - LAT PEAK E' VEL: 11.4 CM/SEC
BH CV ECHO MEAS - LV DIASTOLIC VOL/BSA (35-75): 31.2 ML/M^2
BH CV ECHO MEAS - LV MASS(C)D: 176 GRAMS
BH CV ECHO MEAS - LV MASS(C)DI: 80 GRAMS/M^2
BH CV ECHO MEAS - LV MAX PG: 4.1 MMHG
BH CV ECHO MEAS - LV MEAN PG: 2 MMHG
BH CV ECHO MEAS - LV SYSTOLIC VOL/BSA (12-30): 11 ML/M^2
BH CV ECHO MEAS - LV V1 MAX: 101 CM/SEC
BH CV ECHO MEAS - LV V1 MEAN: 70.5 CM/SEC
BH CV ECHO MEAS - LV V1 VTI: 25 CM
BH CV ECHO MEAS - LVIDD: 4.9 CM
BH CV ECHO MEAS - LVIDS: 3.3 CM
BH CV ECHO MEAS - LVLD AP4: 8.2 CM
BH CV ECHO MEAS - LVLS AP4: 6.4 CM
BH CV ECHO MEAS - LVOT AREA (M): 3.1 CM^2
BH CV ECHO MEAS - LVOT AREA: 3.1 CM^2
BH CV ECHO MEAS - LVOT DIAM: 2 CM
BH CV ECHO MEAS - LVPWD: 0.9 CM
BH CV ECHO MEAS - MED PEAK E' VEL: 7.7 CM/SEC
BH CV ECHO MEAS - MV A MAX VEL: 85.4 CM/SEC
BH CV ECHO MEAS - MV DEC SLOPE: 390 CM/SEC^2
BH CV ECHO MEAS - MV DEC TIME: 0.23 SEC
BH CV ECHO MEAS - MV E MAX VEL: 91.7 CM/SEC
BH CV ECHO MEAS - MV E/A: 1.1
BH CV ECHO MEAS - MV P1/2T MAX VEL: 109 CM/SEC
BH CV ECHO MEAS - MV P1/2T: 81.9 MSEC
BH CV ECHO MEAS - MVA P1/2T LCG: 2 CM^2
BH CV ECHO MEAS - MVA(P1/2T): 2.7 CM^2
BH CV ECHO MEAS - PI END-D VEL: 124 CM/SEC
BH CV ECHO MEAS - SI(AO): 130.9 ML/M^2
BH CV ECHO MEAS - SI(CUBED): 37.1 ML/M^2
BH CV ECHO MEAS - SI(LVOT): 35.7 ML/M^2
BH CV ECHO MEAS - SI(MOD-SP4): 20.2 ML/M^2
BH CV ECHO MEAS - SI(TEICH): 31.2 ML/M^2
BH CV ECHO MEAS - SV(AO): 288.1 ML
BH CV ECHO MEAS - SV(CUBED): 81.7 ML
BH CV ECHO MEAS - SV(LVOT): 78.5 ML
BH CV ECHO MEAS - SV(MOD-SP4): 44.4 ML
BH CV ECHO MEAS - SV(TEICH): 68.7 ML
BH CV ECHO MEASUREMENTS AVERAGE E/E' RATIO: 9.6
BUN BLD-MCNC: 18 MG/DL (ref 5–21)
BUN/CREAT SERPL: 16.5 (ref 7–25)
CALCIUM SPEC-SCNC: 9 MG/DL (ref 8.4–10.4)
CHLORIDE SERPL-SCNC: 102 MMOL/L (ref 98–110)
CHOLEST SERPL-MCNC: 180 MG/DL (ref 130–200)
CO2 SERPL-SCNC: 30 MMOL/L (ref 24–31)
CREAT BLD-MCNC: 1.09 MG/DL (ref 0.5–1.4)
DEPRECATED RDW RBC AUTO: 41.9 FL (ref 40–54)
ERYTHROCYTE [DISTWIDTH] IN BLOOD BY AUTOMATED COUNT: 12.5 % (ref 12–15)
GFR SERPL CREATININE-BSD FRML MDRD: 67 ML/MIN/1.73
GLUCOSE BLD-MCNC: 81 MG/DL (ref 70–100)
HCT VFR BLD AUTO: 39.7 % (ref 40–52)
HDLC SERPL-MCNC: 42 MG/DL
HGB BLD-MCNC: 13.1 G/DL (ref 14–18)
LDLC/HDLC SERPL: 2.77 {RATIO}
LEFT ATRIUM VOLUME INDEX: 28.3 ML/M2
MAXIMAL PREDICTED HEART RATE: 153 BPM
MCH RBC QN AUTO: 30 PG (ref 28–32)
MCHC RBC AUTO-ENTMCNC: 33 G/DL (ref 33–36)
MCV RBC AUTO: 91.1 FL (ref 82–95)
PLATELET # BLD AUTO: 263 10*3/MM3 (ref 130–400)
PMV BLD AUTO: 10.9 FL (ref 6–12)
POTASSIUM BLD-SCNC: 4.7 MMOL/L (ref 3.5–5.3)
RBC # BLD AUTO: 4.36 10*6/MM3 (ref 4.8–5.9)
SODIUM BLD-SCNC: 140 MMOL/L (ref 135–145)
STRESS TARGET HR: 130 BPM
TRIGL SERPL-MCNC: 108 MG/DL (ref 0–149)
TROPONIN I SERPL-MCNC: <0.012 NG/ML (ref 0–0.03)
TROPONIN I SERPL-MCNC: <0.012 NG/ML (ref 0–0.03)
TSH SERPL DL<=0.05 MIU/L-ACNC: 1.79 MIU/ML (ref 0.47–4.68)
WBC NRBC COR # BLD: 9.71 10*3/MM3 (ref 4.8–10.8)

## 2019-02-01 PROCEDURE — 94799 UNLISTED PULMONARY SVC/PX: CPT

## 2019-02-01 PROCEDURE — 93880 EXTRACRANIAL BILAT STUDY: CPT | Performed by: SURGERY

## 2019-02-01 PROCEDURE — 0296T HC EXT ECG > 48HR TO 21 DAY RCRD W/CONECT INTL RCRD: CPT

## 2019-02-01 PROCEDURE — 70551 MRI BRAIN STEM W/O DYE: CPT

## 2019-02-01 PROCEDURE — 84443 ASSAY THYROID STIM HORMONE: CPT | Performed by: PSYCHIATRY & NEUROLOGY

## 2019-02-01 PROCEDURE — 80061 LIPID PANEL: CPT | Performed by: PSYCHIATRY & NEUROLOGY

## 2019-02-01 PROCEDURE — 80048 BASIC METABOLIC PNL TOTAL CA: CPT | Performed by: NURSE PRACTITIONER

## 2019-02-01 PROCEDURE — G0378 HOSPITAL OBSERVATION PER HR: HCPCS

## 2019-02-01 PROCEDURE — 96366 THER/PROPH/DIAG IV INF ADDON: CPT

## 2019-02-01 PROCEDURE — 97161 PT EVAL LOW COMPLEX 20 MIN: CPT

## 2019-02-01 PROCEDURE — 93880 EXTRACRANIAL BILAT STUDY: CPT

## 2019-02-01 PROCEDURE — 97165 OT EVAL LOW COMPLEX 30 MIN: CPT | Performed by: OCCUPATIONAL THERAPIST

## 2019-02-01 PROCEDURE — 99204 OFFICE O/P NEW MOD 45 MIN: CPT | Performed by: INTERNAL MEDICINE

## 2019-02-01 PROCEDURE — 85027 COMPLETE CBC AUTOMATED: CPT | Performed by: NURSE PRACTITIONER

## 2019-02-01 PROCEDURE — 25010000002 AMIODARONE IN DEXTROSE 5% 360-4.14 MG/200ML-% SOLUTION: Performed by: EMERGENCY MEDICINE

## 2019-02-01 PROCEDURE — 84484 ASSAY OF TROPONIN QUANT: CPT | Performed by: NURSE PRACTITIONER

## 2019-02-01 PROCEDURE — 93306 TTE W/DOPPLER COMPLETE: CPT | Performed by: INTERNAL MEDICINE

## 2019-02-01 PROCEDURE — 93306 TTE W/DOPPLER COMPLETE: CPT

## 2019-02-01 PROCEDURE — 99213 OFFICE O/P EST LOW 20 MIN: CPT | Performed by: PSYCHIATRY & NEUROLOGY

## 2019-02-01 PROCEDURE — 94760 N-INVAS EAR/PLS OXIMETRY 1: CPT

## 2019-02-01 RX ORDER — ASPIRIN 81 MG/1
81 TABLET, CHEWABLE ORAL DAILY
Qty: 30 TABLET | Refills: 1 | Status: SHIPPED | OUTPATIENT
Start: 2019-02-02 | End: 2019-02-07 | Stop reason: SDUPTHER

## 2019-02-01 RX ORDER — ATORVASTATIN CALCIUM 10 MG/1
10 TABLET, FILM COATED ORAL DAILY
Qty: 30 TABLET | Refills: 1 | Status: SHIPPED | OUTPATIENT
Start: 2019-02-01 | End: 2019-02-07 | Stop reason: SDUPTHER

## 2019-02-01 RX ADMIN — SODIUM CHLORIDE, PRESERVATIVE FREE 10 ML: 5 INJECTION INTRAVENOUS at 06:47

## 2019-02-01 RX ADMIN — ASPIRIN 81 MG 81 MG: 81 TABLET ORAL at 08:23

## 2019-02-01 RX ADMIN — AMIODARONE HYDROCHLORIDE 0.5 MG/MIN: 1.8 INJECTION, SOLUTION INTRAVENOUS at 05:25
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
## 2019-02-01 NOTE — PROGRESS NOTES
Neurology Progress Note      Chief Complaint:  TIA    Subjective     Subjective:    No further events  Medications:  Current Facility-Administered Medications   Medication Dose Route Frequency Provider Last Rate Last Dose   • acetaminophen (TYLENOL) tablet 650 mg  650 mg Oral Q4H PRN La Mckinney APRN       • aspirin chewable tablet 81 mg  81 mg Oral Daily Lui Lo MD   81 mg at 02/01/19 0823    Or   • aspirin suppository 300 mg  300 mg Rectal Daily Lui Lo MD       • atorvastatin (LIPITOR) tablet 80 mg  80 mg Oral Nightly Lui Lo MD   80 mg at 01/31/19 2045   • labetalol (NORMODYNE,TRANDATE) injection 10 mg  10 mg Intravenous Q10 Min PRN Lui Lo MD       • ondansetron (ZOFRAN) injection 4 mg  4 mg Intravenous Q6H PRN Lui Lo MD       • ondansetron (ZOFRAN) tablet 4 mg  4 mg Oral Q6H PRN La Mckinney APRN        Or   • ondansetron ODT (ZOFRAN-ODT) disintegrating tablet 4 mg  4 mg Oral Q6H PRN La Mckinney APRN        Or   • ondansetron (ZOFRAN) injection 4 mg  4 mg Intravenous Q6H PRN La Mckinney, CECELIA       • sodium chloride 0.9 % flush 10 mL  10 mL Intravenous PRN Ray Carranza MD       • sodium chloride 0.9 % flush 3 mL  3 mL Intravenous Q12H Lui Lo MD       • sodium chloride 0.9 % flush 3 mL  3 mL Intravenous Q12H La Mckinney APRN       • sodium chloride 0.9 % flush 3-10 mL  3-10 mL Intravenous PRN Lui Lo MD       • sodium chloride 0.9 % flush 3-10 mL  3-10 mL Intravenous PRN La Mckinney APRN   10 mL at 02/01/19 0647       Review of Systems:   -A 14 point review of systems is completed and is negative except for ataxia      Objective      Vital Signs  Temp:  [97.8 °F (36.6 °C)-98.3 °F (36.8 °C)] 97.9 °F (36.6 °C)  Heart Rate:  [66-79] 73  Resp:  [14-20] 20  BP: (114-156)/(43-93) 121/61    Physical Exam:    General Exam:  Head:  Normal cephalic, atraumatic  HEENT:  Neck supple  Fundoscopic  Exam:  No signs of disc edema  CVS:  Regular rate and rhythm.  No murmurs  Carotid Examination:  No bruits  Lungs:  Clear to auscultation  Abdomen:  Non-tender, Non-distended  Extremities:  No signs of peripheral edema  Skin:  No rashes    Neurologic Exam:    Mental Status:    -Awake, Alert, Oriented X 3  -No word finding difficulties  -No aphasia  -No dysarthria  -Follows simple and complex commands    CN II:  Visual fields full.  Pupils equally reactive to light  CN III, IV, VI:  Extraocular Muscles full with no signs of nystagmus  CN V:  Facial sensory is symmetric with no asymmetries.  CN VII:  Facial motor symmetric  CN VIII:  Gross hearing intact bilaterally  CN IX:  Palate elevates symmetrically  CN X:  Palate elevates symmetrically  CN XI:  Shoulder shrug symmetric  CN XII:  Tongue is midline on protrusion    Motor: (strength out of 5:  1= minimal movement, 2 = movement in plane of gravity, 3 = movement against gravity, 4 = movement against some resistance, 5 = full strength)    -Right Upper Ext: Proximal: 5 Distal: 5  -Left Upper Ext: Proximal: 5 Distal: 5    -Right Lower Ext: Proximal: 5 Distal: 5  -Left Lower Ext: Proximal: 5 Distal: 5    DTR:  -Right   Bicep: 2+ Tricep: 2+ Brachioradialis: 2+   Patella: 2+ Ankle: 2+ Neg Babinski  -Left   Bicep: 2+ Tricep: 2+ Brachioradialis: 2+   Patella: 2+ Ankle: 2+ Neg Babinski    Sensory:  -Intact to light touch, pinprick, temperature, pain, and proprioception    Coordination:  -Finger to nose intact  -Heel to shin intact  -No ataxia    Gait  -No signs of ataxia  -ambulates unassisted       Results Review:    I reviewed the patient's new clinical results.    Results from last 7 days   Lab Units 02/01/19  0505 01/31/19  1714   WBC 10*3/mm3 9.71 7.53   HEMOGLOBIN g/dL 13.1* 14.3   HEMATOCRIT % 39.7* 42.0   PLATELETS 10*3/mm3 263 277        Results from last 7 days   Lab Units 02/01/19  0505 01/31/19  1714   SODIUM mmol/L 140 140   POTASSIUM mmol/L 4.7 3.5   CHLORIDE  mmol/L 102 101   CO2 mmol/L 30.0 30.0   BUN mg/dL 18 19   CREATININE mg/dL 1.09 1.12   CALCIUM mg/dL 9.0 9.0   BILIRUBIN mg/dL  --  0.5   ALK PHOS U/L  --  58   ALT (SGPT) U/L  --  27   AST (SGOT) U/L  --  35   GLUCOSE mg/dL 81 100        Lab Results   Component Value Date    MG 2.3 (H) 01/31/2019    PROTIME 12.8 01/31/2019    INR 0.94 01/31/2019     No components found for: POCGLUC  No components found for: A1C  Lab Results   Component Value Date    HDL 42 02/01/2019     (H) 02/01/2019     No components found for: B12  Lab Results   Component Value Date    TSH 1.790 02/01/2019     MRI brain, cardiac echo, and carotid ultrasound all reviewed by me and showed no significant findings.  Assessment/Plan     Hospital Problem List      Ventricular tachycardia (CMS/HCC)    Impression:  1.  Ataxic episode  --The etiology behind this episode is unknown at this point in time.  I think it would be reasonable to consider that this may represent transient ischemic attack.  As there was no certain focality I cannot say this with definity.  Instead of one to treat this as if this was a transient ischemic attack with a low-dose aspirin and low-dose statin.  His LDL goal be less than 70 and his systolic blood pressure goal less than 140.  We will have him follow-up in the outpatient neurology clinic in 3 weeks.    Plan:  · OK for D/C  · ASA 81mg  · Lipitor 10mg QHS  · Follow up 3 weeks        Lui Lo MD  02/01/19  11:07 AM

## 2019-02-01 NOTE — PROGRESS NOTES
Discharge Planning Assessment  [facility]     Patient Name: Rasheed Arevalo  MRN: [id number]  Today's Date: 2/1/2019    Admit Date: 1/31/2019    Discharge Needs Assessment     Row Name 02/01/19 1303       Living Environment    Lives With  spouse    Name(s) of Who Lives With Patient  JIL    Current Living Arrangements  home/apartment/condo    Primary Care Provided by  self    Provides Primary Care For  no one    Family Caregiver if Needed  spouse    Family Caregiver Names  JIL    Quality of Family Relationships  helpful;involved;supportive    Able to Return to Prior Arrangements  yes    Living Arrangement Comments  PLAN IS FOR RETURN TO HOME       Resource/Environmental Concerns    Resource/Environmental Concerns  none    Transportation Concerns  car, none       Transition Planning    Patient/Family Anticipates Transition to  home with family    Patient/Family Anticipated Services at Transition  none    Transportation Anticipated  family or friend will provide       Discharge Needs Assessment    Readmission Within the Last 30 Days  no previous admission in last 30 days    Concerns to be Addressed  no discharge needs identified;denies needs/concerns at this time    Equipment Currently Used at Home  none    Anticipated Changes Related to Illness  none    Equipment Needed After Discharge  none    Discharge Coordination/Progress  PT IS BEING DCD HOME TODAY. PT LIVES WITH SPOUSE AND PLANS TO RETURN HOME WITH SPOUSE. PT DOES NOT USE ANY DME OR HOME SERVICES. PT HAS RX COVERAGE. DC NEEDS DENIED.         Discharge Plan     Row Name 02/01/19 1307       Plan    Final Discharge Disposition Code  01 - home or self-care    Final Note  PT IS BEING DCD HOME TODAY WITH SPOUSE. DC NEEDS DENIED.         Destination      No service coordination in this encounter.      Durable Medical Equipment      No service coordination in this encounter.      Dialysis/Infusion      No service coordination in this encounter.      Home Medical  Care      No service coordination in this encounter.      Community Resources      No service coordination in this encounter.        Expected Discharge Date and Time     Expected Discharge Date Expected Discharge Time    Feb 1, 2019         Demographic Summary    No documentation.       Functional Status    No documentation.       Psychosocial    No documentation.       Abuse/Neglect    No documentation.       Legal    No documentation.       Substance Abuse    No documentation.       Patient Forms    No documentation.           VALDEMAR Betancur

## 2019-02-01 NOTE — PLAN OF CARE
Problem: Patient Care Overview  Goal: Plan of Care Review  Outcome: Ongoing (interventions implemented as appropriate)   02/01/19 5933   Coping/Psychosocial   Plan of Care Reviewed With patient;family   Plan of Care Review   Progress no change  (Screening only. )   OTHER   Outcome Summary Speech screening completed. Patient has returned to baseline functioning. Able to communicate verbally and understand spoken language. No impairments present that require skilled speech intervention.

## 2019-02-01 NOTE — PLAN OF CARE
Problem: Patient Care Overview  Goal: Plan of Care Review  Outcome: Ongoing (interventions implemented as appropriate)   02/01/19 0636   Coping/Psychosocial   Plan of Care Reviewed With patient   Plan of Care Review   Progress improving   OTHER   Outcome Summary Amio gtt continues. No runs of VT this shift. Pt held NPO after MN for MRI brain, 2D echo, and non-invasive carotid study this AM. New consult for cardiology. VSS. SR on tele.       Problem: Arrhythmia/Dysrhythmia (Symptomatic) (Adult)  Goal: Signs and Symptoms of Listed Potential Problems Will be Absent, Minimized or Managed (Arrhythmia/Dysrhythmia)  Outcome: Ongoing (interventions implemented as appropriate)
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
## 2019-02-01 NOTE — PLAN OF CARE
Problem: Patient Care Overview  Goal: Plan of Care Review  Outcome: Ongoing (interventions implemented as appropriate)   02/01/19 1141   Coping/Psychosocial   Plan of Care Reviewed With patient;spouse;son   Plan of Care Review   Progress improving   OTHER   Outcome Summary OT Eval completed. Pt shows no deficits in B UE or vision. He is independent with all mobility and self care. He has returned to his PLOF. Anticipated d/c is home with spouse. No OT intervention required.

## 2019-02-01 NOTE — DISCHARGE SUMMARY
"    [facility] Medicine Services  DISCHARGE SUMMARY     Date of Admission: 1/31/2019  Date of Discharge:  2/1/2019  Primary Care Physician: Wild Alatorre DO    Presenting Problem/History of Present Illness:  Presented with difficulty walking and \"feeling funny\"    Discharge Diagnoses:  Active Hospital Problems    Diagnosis   • Ataxia   • Hyperlipidemia     Chief Complaint on Day of Discharge: No complaints.  No further episodes  History of Present Illness on Day of Discharge:   Sitting up in bed.  Family members in room.  No further episodes of ataxia.  No arrhythmias noted on telemetry.  Results of all testing discussed with patient and family members.  Denies nausea, vomiting or abdominal pain.  Denies chest pain, palpitations, or shortness of breath.    Consults:   1.  Dr. Lui Lo, neurology  2.  Dr. Shimon Carrillo, cardiology    Procedures Performed: none    Pertinent Test Results:   Results for orders placed during the hospital encounter of 01/31/19   Adult transthoracic echo complete    Narrative · Left ventricular systolic function is normal.  · Normal size and function of right ventricle.  · No significant valvular pathology.  · Negative bubble study.        Laboratory Data:    Results from last 7 days   Lab Units 02/01/19  0505 01/31/19  1714   WBC 10*3/mm3 9.71 7.53   HEMOGLOBIN g/dL 13.1* 14.3   HEMATOCRIT % 39.7* 42.0   PLATELETS 10*3/mm3 263 277        Results from last 7 days   Lab Units 02/01/19  0505 01/31/19  1714   SODIUM mmol/L 140 140   POTASSIUM mmol/L 4.7 3.5   CHLORIDE mmol/L 102 101   CO2 mmol/L 30.0 30.0   BUN mg/dL 18 19   CREATININE mg/dL 1.09 1.12   CALCIUM mg/dL 9.0 9.0   BILIRUBIN mg/dL  --  0.5   ALK PHOS U/L  --  58   ALT (SGPT) U/L  --  27   AST (SGOT) U/L  --  35   GLUCOSE mg/dL 81 100     Total Cholesterol 180 mg/dL   Triglycerides 108 mg/dL   HDL Cholesterol 42 mg/dL   LDL Cholesterol  132 Abnormally high  mg/dL   LDL/HDL Ratio 2.77  "                  02/01/2019 0505  02/01/2019 0646  TSH [[id number]]   Blood     Final result  TSH Baseline 1.790 mIU/mL          02/01/2019 0505  02/01/2019 0614  Troponin [[id number]]   Blood     Final result  Troponin I <0.012 ng/mL            Protime 12.8 Seconds   INR 0.94                   01/31/2019 1714  01/31/2019 1745  aPTT [[id number]]   Blood     Final result  PTT 24.9 seconds          01/31/2019 1714 01/31/2019 1755  Troponin [[id number]]   Blood     Final result  Troponin I <0.012 ng/mL            Magnesium 2.3 Abnormally high  mg/dL                  01/31/2019 1714 01/31/2019 2034  Hemoglobin A1c [[id number]]    Blood     Final result  Hemoglobin A1C 5.4 %          01/31/2019 1714 01/31/2019 2005  TSH [[id number]]   Blood     Final result  TSH Baseline 3.420 mIU/mL        Imaging Results (all)     Procedure Component Value Units Date/Time    MRI Brain Without Contrast [[id number]] Collected:  02/01/19 0742     Updated:  02/01/19 0753    Narrative:       EXAMINATION: MRI BRAIN WO CONTRAST- 2/1/2019 7:42 AM CST     HISTORY: Stroke; I47.2-Ventricular tachycardia; R27.0-Ataxia,  unspecified.     REPORT: Multiplanar multisequence MR imaging of the brain was performed  without contrast. Comparison is made with CT of the head without  contrast 1/31/2019.     There is no diffusion restriction to indicate acute ischemia. No  intracranial mass or mass effect is identified. Gradient echo images  show no evidence of intracranial hemorrhage. The ventricles and basal  cisterns are within normal limits. The intracranial vascular flow-voids  appear unremarkable. There is single focus of increased FLAIR signal  within the subcortical left parietal white matter that measures about 5  mm. This is nonspecific and may represent changes associated with  chronic small vessel white matter ischemic disease. The extracranial  structures appear unremarkable.       Impression:       No acute intracranial findings, no evidence of  "acute  ischemia. Single small 5 mm focus of increased FLAIR signal within the  left parietal subcortical white matter, possibly related to chronic  small vessel white matter ischemic disease..  This report was finalized on 02/01/2019 07:50 by Dr. Neal Hampton MD.    US Carotid Bilateral [[id number]] Updated:  02/01/19 0724    CT Head Without Contrast Stroke Protocol [[id number]] Collected:  01/31/19 1724     Updated:  01/31/19 1731    Narrative:       EXAMINATION: CT HEAD WO CONTRAST STROKE PROTOCOL- 1/31/2019 5:24 PM CST     HISTORY: Stroke, code stroke protocol.     DOSE: 819 mGycm (Automatic exposure control technique was implemented in  an effort to keep the radiation dose as low as possible without  compromising image quality)     REPORT: Spiral CT of the head was performed without contrast,  reconstructed coronal and sagittal images were also reviewed.     COMPARISON: None.     No intracranial hemorrhage, mass or mass effect is identified. The  ventricles and basal cisterns are normal. Review of bone windows is  unremarkable. There is normal aeration of the visualized paranasal  sinuses and mastoid air cells.       Impression:       1. No intracranial hemorrhage or acute findings. MRI is more sensitive  for acute CVA.     REPORT called to emergency department at the time of dictation and given  to Ray Carranza at 1728 hours on the date of the examination per code  stroke protocol.        This report was finalized on 01/31/2019 17:28 by Dr. Neal Hampton MD.          Hospital Course  Patient is a 68 y.o. male presented to [facility] emergency room with \"feeling funny\".  Patient reported at 3:30 he was walking down the em states he felt like he was walking like \"a druck person\".  He felt as though he was going to fall but he was able to keep himself upright.  He returned to his office and sat for a few minutes.  He then drove himself to another office 2 blocks away at 4 PM.  He began 7 worse " during the meeting and numbers of the meeting reported he looked pale and was advised to present to the emergency room.  He reported nausea and vomiting prior to entry to the emergency room.  Patient denied dizziness or vertigo, speech changes, facial droop, vision changes, focal weakness or numbness.  His chief complaint was ataxia and gait and that he had to widen his stance to keep from falling.  He denied difficulties with coordination of arms or hands.  Code stroke was called in the emergency room.  Stat CT scan of the head showed no acute features.  After patient returned from CT scan symptoms improved without focal deficits upon exam.  He was seen by Dr. Lo with neurology.  TPA was not administered due to rapid improvement of symptoms.  There was concern for intermittent V. tach on the monitor.  Patient did report pending the same during suspected arrhythmias as he felt his office.  He reported feeling better after receiving IV amiodarone.  He denied chest pain, shortness of breath, dizziness, chest vision changes, weakness, change in bowel or bladder.  This placed on amiodarone drip in the emergency room.    He was admitted to the telemetry floor with suspected ventricular tachycardia, ataxia resolved and ongoing workup to rule out stroke.  He was placed on telemetry and remained normal sinus rhythm.  Amiodarone drip remained in place.  Serial troponins were monitored and remained negative.  He was seen by Dr. Lo, neurology while in the emergency room.  Impression included ataxia, ventricular tachycardia.  He was placed on aspirin 81 mg and statin therapy.  No TPA administered as NIHSS 0.  Ongoing workup included MRI of the brain 2/1 that reported no acute intracranial findings, no acute ischemia.  Single small 5 mm focus of increased FLAIR signal possibly related to chronic small vessel white matter ischemic disease.  Noninvasive carotids less than 50% stenosis bilaterally.  Echocardiogram 2/1/19  left ventricular systolic function normal.  No significant valvular pathology.  Negative bubble study.  Ejection fraction 61-65%.  Physical therapy and occupational therapy consulted.  No acute findings and no needs identified.  Dr. Lo reassessed 2/1 with impression ataxic episode.  Etiology undetermined.  Reasonable to consider transient ischemic attack.  As there was no certain focality Dr. Lo could not say this with definity.  However, he elected to treat with low-dose aspirin and low-dose statin as LDL goal would be less than 70.  Follow-up with neurology in 3 weeks.    He was seen in consultation by Dr. Carrillo with cardiology.  Patient had been placed on amiodarone infusion in the emergency room.  Patient reported symptoms resolved after amiodarone.  No further funny feeling since admission.  Dr. Carrillo reviewed rhythm strips from the emergency room that showed normal sinus rhythm with artifact baseline motion.  This did not appear consistent with ventricular tachycardia.  Overnight telemetry has remained normal sinus rhythm without any ectopy.  No PVCs. Dr. Carrillo further reviewed EKGs with interpretation normal sinus rhythm.  Echocardiogram negative bubble study.  Negative valvular pathology.  Left ventricular systolic function normal.  After further investigation by Dr. Carrillo, he could not determine any reason patient would have ventricular tachycardia.  Heart is structurally normal by echo and no symptoms or findings to suggest ischemic origin.  Troponins negative.  EKG normal.  Dr. aCrrillo recommended discontinuing amiodarone drip and patient could be discharged home with 14 day ZIO patch monitoring.  Dr. Carrillo will follow up 3/5/19.  No additional medications per cardiology recommended.    Patient had arranged follow-up with Dr. Alatorre to establish care as new patient.  We have rescheduled appointment for 2/7/19 as patient was recently hospitalized.    On 2/1/19 he is medically stable for discharge.   "No further episodes of \"runny feeling\".  No arrhythmias identified on telemetry.  He was evaluated by cardiology who feels as was not likely ventricular tachycardia but rather artifact.  Results of all test discussed with patient and family members. ZIO patch placed prior to discharge.    Physical Exam on Discharge:  /77 (BP Location: Left arm, Patient Position: Lying)   Pulse 80   Temp 97.6 °F (36.4 °C) (Oral)   Resp 18   Ht 182.9 cm (72\")   Wt 98 kg (216 lb)   SpO2 95%   BMI 29.29 kg/m²   Physical Exam   Constitutional: He is oriented to person, place, and time. He appears well-developed and well-nourished.   HENT:   Head: Normocephalic and atraumatic.   Eyes: EOM are normal. Pupils are equal, round, and reactive to light.   Neck: Normal range of motion. Neck supple.   Cardiovascular: Normal rate, regular rhythm, normal heart sounds and intact distal pulses. Exam reveals no gallop and no friction rub.   No murmur heard.  Normal sinus rhythm 63-81 on telemetry   Pulmonary/Chest: Effort normal and breath sounds normal.   No oxygen in use   Abdominal: Soft. Bowel sounds are normal.   Genitourinary:   Genitourinary Comments: Voiding.   Musculoskeletal: Normal range of motion. He exhibits no edema.   Neurological: He is alert and oriented to person, place, and time.   Skin: Skin is warm and dry.   Psychiatric: He has a normal mood and affect. His behavior is normal. Judgment and thought content normal.     Condition on Discharge: Stable    Discharge Disposition:  Home with family    Discharge Diet:   Diet Instructions     Advance Diet As Tolerated          Activity at Discharge:   Activity Instructions     Activity as Tolerated          Discharge Care Plan / Instructions:   1.  Aspirin 81 mg orally daily  2.  Lipitor 10 mg orally at night  3.  Zio patch at discharge  4.  For worsening returning symptoms seek medical attention.    Discharge Medications:     Discharge Medications      New Medications      " Instructions Start Date   aspirin 81 MG chewable tablet   81 mg, Oral, Daily      atorvastatin 10 MG tablet  Commonly known as:  LIPITOR   10 mg, Oral, Daily           Follow-up Appointments:   Follow-up Information     Shimon Carrillo MD Follow up in 1 month(s).    Specialty:  Cardiology  Why:  March 5th at 1:30pm  Contact information:  [address]  [facility] [address]  [phone]             Wild Alatorre, DO Follow up.    Specialty:  Internal Medicine  Why:  FEB 7TH AT 3:00PM  Contact information:  [address]  [facility] [address]  [phone]             [facility] NEUROLOGY Follow up in 3 week(s).    Specialty:  Neurology  Why:  April 18 at 9:00am  Contact information:  [address]  [facility] [id number] [phone]               Future Appointments:  Future Appointments   Date Time Provider Department Center   2/1/2019  3:00 PM [facility] US [facility] CART 2  [facility] US PAD   2/7/2019  3:00 PM Wild Alatorre,  [facility] PAD None   3/5/2019  1:30 PM Shimon Carrillo MD [facility]   4/18/2019  9:00 AM Mónica Hunt APRN [facility] N PAD None   4/25/2019  8:10 AM Yaya Pollard MD [facility] U PAD None     Test Results Pending at Discharge: none    The above documentation resulted from a face-to-face encounter by me Catherine RAI, [facility].    CECELIA Llamas  02/01/19  12:58 PM    Time:  This discharge process required 35 minutes for completion.     Plan discussed with Dr. Flannery, Dr. Carrillo, Dr. Lo, patient, and wife.    Time spent in face-to-face evaluation, chart review, planning and education 35 minutes.  Please note that portions of this note may have been completed with a voice recognition program. Efforts were made to edit the dictations, but occasionally words are mistranscribed.

## 2019-02-05 ENCOUNTER — TELEPHONE (OUTPATIENT)
Dept: INTERNAL MEDICINE | Age: 69
End: 2019-02-05

## 2019-02-07 ENCOUNTER — OFFICE VISIT (OUTPATIENT)
Dept: INTERNAL MEDICINE | Facility: CLINIC | Age: 69
End: 2019-02-07

## 2019-02-07 VITALS
WEIGHT: 216.9 LBS | BODY MASS INDEX: 29.38 KG/M2 | HEIGHT: 72 IN | SYSTOLIC BLOOD PRESSURE: 140 MMHG | DIASTOLIC BLOOD PRESSURE: 74 MMHG | OXYGEN SATURATION: 98 % | HEART RATE: 86 BPM | RESPIRATION RATE: 16 BRPM

## 2019-02-07 DIAGNOSIS — Z86.73 HISTORY OF TIA (TRANSIENT ISCHEMIC ATTACK): Primary | ICD-10-CM

## 2019-02-07 DIAGNOSIS — Z00.00 ENCOUNTER FOR PREVENTIVE HEALTH EXAMINATION: ICD-10-CM

## 2019-02-07 DIAGNOSIS — E66.3 OVERWEIGHT (BMI 25.0-29.9): ICD-10-CM

## 2019-02-07 DIAGNOSIS — Z12.5 ENCOUNTER FOR SCREENING FOR MALIGNANT NEOPLASM OF PROSTATE: ICD-10-CM

## 2019-02-07 DIAGNOSIS — Z87.442 HISTORY OF KIDNEY STONES: ICD-10-CM

## 2019-02-07 DIAGNOSIS — N28.1 RENAL CYSTS, ACQUIRED, BILATERAL: ICD-10-CM

## 2019-02-07 DIAGNOSIS — N40.0 BENIGN NON-NODULAR PROSTATIC HYPERPLASIA WITHOUT LOWER URINARY TRACT SYMPTOMS: ICD-10-CM

## 2019-02-07 DIAGNOSIS — E78.2 MIXED HYPERLIPIDEMIA: ICD-10-CM

## 2019-02-07 PROBLEM — J30.89 CHRONIC NON-SEASONAL ALLERGIC RHINITIS: Status: ACTIVE | Noted: 2018-04-10

## 2019-02-07 PROCEDURE — 99204 OFFICE O/P NEW MOD 45 MIN: CPT | Performed by: INTERNAL MEDICINE

## 2019-02-07 RX ORDER — ASPIRIN 81 MG/1
81 TABLET, CHEWABLE ORAL DAILY
Qty: 90 TABLET | Refills: 3 | Status: SHIPPED | OUTPATIENT
Start: 2019-02-07 | End: 2021-07-12

## 2019-02-07 RX ORDER — ATORVASTATIN CALCIUM 10 MG/1
10 TABLET, FILM COATED ORAL DAILY
Qty: 90 TABLET | Refills: 3 | Status: SHIPPED | OUTPATIENT
Start: 2019-02-07 | End: 2019-08-08 | Stop reason: SDUPTHER
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
## 2019-02-25 PROCEDURE — 0298T PR EXT ECG > 48HR TO 21 DAY REVIEW AND INTERPRETATN: CPT | Performed by: INTERNAL MEDICINE

## 2019-03-05 ENCOUNTER — OFFICE VISIT (OUTPATIENT)
Dept: CARDIOLOGY | Facility: CLINIC | Age: 69
End: 2019-03-05

## 2019-03-05 VITALS
HEIGHT: 72 IN | WEIGHT: 217 LBS | HEART RATE: 73 BPM | SYSTOLIC BLOOD PRESSURE: 123 MMHG | DIASTOLIC BLOOD PRESSURE: 70 MMHG | BODY MASS INDEX: 29.39 KG/M2

## 2019-03-05 DIAGNOSIS — I47.1 SUPRAVENTRICULAR TACHYCARDIA (HCC): ICD-10-CM

## 2019-03-05 DIAGNOSIS — E78.2 MIXED HYPERLIPIDEMIA: Primary | ICD-10-CM

## 2019-03-05 PROBLEM — R07.89 OTHER CHEST PAIN: Status: RESOLVED | Noted: 2019-03-05 | Resolved: 2019-03-05

## 2019-03-05 PROBLEM — R07.89 OTHER CHEST PAIN: Status: ACTIVE | Noted: 2019-03-05

## 2019-03-05 PROBLEM — I47.10 SUPRAVENTRICULAR TACHYCARDIA: Status: ACTIVE | Noted: 2019-03-05

## 2019-03-05 PROCEDURE — 99213 OFFICE O/P EST LOW 20 MIN: CPT | Performed by: INTERNAL MEDICINE

## 2019-03-05 PROCEDURE — 93000 ELECTROCARDIOGRAM COMPLETE: CPT | Performed by: INTERNAL MEDICINE

## 2019-03-05 NOTE — PROGRESS NOTES
"     Subjective:     Encounter Date:03/05/2019      Patient ID: Rasheed Arevalo is a 68 y.o. male.    Chief Complaint: f/u hospitalization for possible ventricular arrhythmia  History of Present Illness  68-year-old male whom I met in consultation in the hospital on file first, 2019, when he presented with a \"funny feeling.\"  At that time, he best characterized this as lightheadedness, but denied associated dizziness, palpitations, dyspnea, angina, and syncope.  Upon initially arriving in the ER, I was told his cardiac rhythm was normal but he still felt the same way.  Code stroke was initially called, but after evaluation by neurology this was not felt to represent an acute stroke.  He said that his symptoms of \"feeling funny\" were persistent for several hours on the day prior to arrival-- he felt that way while at rest, during a meeting, and then while walking.  While walking, he \"felt drunk\" and was ataxic.  He reported feeling the same degree of unwellness upon arrival in the emergency department and throughout his stay there, including before and after the stroke alert was called off.  Sometime thereafter, reportedly the emergency department staff observed what was felt to represent ventricular ectopy on his monitor, then asked the patient how he felt.  He then later told me he felt the same way at that time as he had when initially presenting to the emergency department -- ie had no change in symptoms.  Feeling that this represented ventricular tachycardia, the emergency department started him on amiodarone infusion, and the patient did report that his symptoms resolved thereafter.  He had no further recurrences of any unusual feeling throughout the rest of his admission.  Echocardiogram revealed a structurally normal heart, and he was discharged home to wear an outpatient cardiac rhythm monitoring device.      He had a similar episode 2/13/19 when in a meeting at [employer].  Was seated and felt a \"funny " "sensation like skin crawling\" in the back of his head then \"got wobbly\" when he stood up.  Had to be careful walking, and was ok after about 10 minutes.  No associated palpitations, dyspnea, or angina.  Later that night, \"felt out of sorts\" while at a function.      Otherwise, he has had no complaints.  He continues to be very active, including outdoor activities, without any chest discomfort, trouble breathing, or palpitations.  No near-syncope or syncope.    The following portions of the patient's history were reviewed and updated as appropriate: allergies, current medications, past family history, past medical history, past social history, past surgical history and problem list.    Review of Systems   Constitution: Negative for malaise/fatigue.   Cardiovascular: Negative for chest pain, claudication, dyspnea on exertion, leg swelling, near-syncope, orthopnea, palpitations, paroxysmal nocturnal dyspnea and syncope.   Respiratory: Negative for shortness of breath.    Hematologic/Lymphatic: Does not bruise/bleed easily.       Current Outpatient Medications:   •  aspirin 81 MG chewable tablet, Chew 1 tablet Daily., Disp: 90 tablet, Rfl: 3  •  atorvastatin (LIPITOR) 10 MG tablet, Take 1 tablet by mouth Daily., Disp: 90 tablet, Rfl: 3       Objective:      Vitals:    03/05/19 1338   BP: 123/70   Pulse: 73     Physical Exam   Constitutional: He is oriented to person, place, and time. He appears well-developed and well-nourished.   Neck: No JVD present.   Cardiovascular: Normal rate, regular rhythm, normal heart sounds and intact distal pulses.   No murmur heard.  Pulmonary/Chest: Effort normal and breath sounds normal.   Musculoskeletal: He exhibits no edema.   Neurological: He is alert and oriented to person, place, and time.   Skin: Skin is warm and dry.       Lab Review:         ECG 12 Lead  Date/Time: 3/5/2019 2:08 PM  Performed by: Shimon Carrillo MD  Authorized by: Shimon Carrillo MD   Comparison: compared with " "previous ECG from 1/31/2019  Similar to previous ECG  Rhythm: sinus rhythm  BPM: 73  Conduction: 1st degree AV block    Clinical impression: normal ECG              Results for orders placed during the hospital encounter of 01/31/19   Adult transthoracic echo complete    Narrative · Left ventricular systolic function is normal.  · Normal size and function of right ventricle.  · No significant valvular pathology.  · Negative bubble study.            Head MRI reviewed: Small 5 mm area of increased FLAIR signal suggested by radiologist represent chronic small vessel ischemia      Assessment/Plan:     Problem List Items Addressed This Visit        Cardiovascular and Mediastinum    Mixed hyperlipidemia - Primary: est, stable    Supraventricular tachycardia (CMS/HCC): new (seen on monitor); asymptomatic thus no further treatment or testing required        Recommendations/plans: No evidence to suggest patient's symptoms are related to an arrhythmia.  Though infrequent and brief, they do sound more neurologic in origin.  He had a small area seen on head MRI of increased FLAIR signal, \"possibly related to chronic small vessel white matter ischemic disease.\"  I cannot comment as to whether his symptoms are actually attributable to this finding, but did emphasize the need to the patient to aggressively optimize any risk factor for vascular disease.  To this end, he was started on low-dose statin therapy in the hospital for an LDL of 132, and I see that Dr. Alatorre has already ordered a repeat lipid panel to be drawn.  I would suggest being aggressive in his LDL management, with increasing his statin dose as needed to keep his LDL under 100, and possibly as low as under 70.  I also agree that he should continue aspirin 81 mg daily for neuro- and cardioprotection.  No further cardiac workup suggested at this point.    I will be happy to see the patient back as needed at any point in the future, but no follow-up is scheduled as of " today.    Shimon Carrillo MD  03/05/2019  2:10 PM

## 2019-04-08 ENCOUNTER — HOSPITAL ENCOUNTER (OUTPATIENT)
Dept: CT IMAGING | Facility: HOSPITAL | Age: 69
Discharge: HOME | End: 2019-04-08
Admitting: UROLOGY

## 2019-04-08 ENCOUNTER — LAB (OUTPATIENT)
Dept: LAB | Facility: HOSPITAL | Age: 69
End: 2019-04-08

## 2019-04-08 DIAGNOSIS — Z86.73 HISTORY OF TIA (TRANSIENT ISCHEMIC ATTACK): ICD-10-CM

## 2019-04-08 DIAGNOSIS — Z00.00 ENCOUNTER FOR PREVENTIVE HEALTH EXAMINATION: ICD-10-CM

## 2019-04-08 DIAGNOSIS — N28.89 RENAL MASS: ICD-10-CM

## 2019-04-08 DIAGNOSIS — E78.2 MIXED HYPERLIPIDEMIA: ICD-10-CM

## 2019-04-08 DIAGNOSIS — Z12.5 ENCOUNTER FOR SCREENING FOR MALIGNANT NEOPLASM OF PROSTATE: ICD-10-CM

## 2019-04-08 LAB
ALBUMIN SERPL-MCNC: 3.8 G/DL (ref 3.5–5)
ALBUMIN/GLOB SERPL: 1.3 G/DL (ref 1.1–2.5)
ALP SERPL-CCNC: 48 U/L (ref 24–120)
ALT SERPL W P-5'-P-CCNC: 23 U/L (ref 0–54)
ANION GAP SERPL CALCULATED.3IONS-SCNC: 10 MMOL/L (ref 4–13)
ARTICHOKE IGE QN: 63 MG/DL (ref 0–99)
AST SERPL-CCNC: 34 U/L (ref 7–45)
BILIRUB SERPL-MCNC: 0.7 MG/DL (ref 0.1–1)
BUN BLD-MCNC: 15 MG/DL (ref 5–21)
BUN/CREAT SERPL: 14.9 (ref 7–25)
CALCIUM SPEC-SCNC: 8.9 MG/DL (ref 8.4–10.4)
CHLORIDE SERPL-SCNC: 104 MMOL/L (ref 98–110)
CHOLEST SERPL-MCNC: 123 MG/DL (ref 130–200)
CO2 SERPL-SCNC: 27 MMOL/L (ref 24–31)
CREAT BLD-MCNC: 1.01 MG/DL (ref 0.5–1.4)
CREAT BLDA-MCNC: 1.1 MG/DL (ref 0.6–1.3)
GFR SERPL CREATININE-BSD FRML MDRD: 74 ML/MIN/1.73
GLOBULIN UR ELPH-MCNC: 2.9 GM/DL
GLUCOSE BLD-MCNC: 110 MG/DL (ref 70–100)
HCV AB SER DONR QL: NEGATIVE
HCV S/C RATIO: 0.03 (ref 0–0.99)
HDLC SERPL-MCNC: 38 MG/DL
LDLC/HDLC SERPL: 1.42 {RATIO}
POTASSIUM BLD-SCNC: 4.3 MMOL/L (ref 3.5–5.3)
PROT SERPL-MCNC: 6.7 G/DL (ref 6.3–8.7)
PSA SERPL-MCNC: 3.34 NG/ML (ref 0–4)
SODIUM BLD-SCNC: 141 MMOL/L (ref 135–145)
TRIGL SERPL-MCNC: 155 MG/DL (ref 0–149)

## 2019-04-08 PROCEDURE — 74170 CT ABD WO CNTRST FLWD CNTRST: CPT

## 2019-04-08 PROCEDURE — 82565 ASSAY OF CREATININE: CPT

## 2019-04-08 PROCEDURE — 80053 COMPREHEN METABOLIC PANEL: CPT | Performed by: INTERNAL MEDICINE

## 2019-04-08 PROCEDURE — 86803 HEPATITIS C AB TEST: CPT | Performed by: INTERNAL MEDICINE

## 2019-04-08 PROCEDURE — 80061 LIPID PANEL: CPT | Performed by: INTERNAL MEDICINE

## 2019-04-08 PROCEDURE — 36415 COLL VENOUS BLD VENIPUNCTURE: CPT

## 2019-04-08 PROCEDURE — G0103 PSA SCREENING: HCPCS | Performed by: INTERNAL MEDICINE

## 2019-04-08 PROCEDURE — 25010000002 IOPAMIDOL 61 % SOLUTION: Performed by: UROLOGY

## 2019-04-08 RX ADMIN — IOPAMIDOL 100 ML: 612 INJECTION, SOLUTION INTRAVENOUS at 08:44

## 2019-04-18 ENCOUNTER — OFFICE VISIT (OUTPATIENT)
Dept: NEUROLOGY | Facility: CLINIC | Age: 69
End: 2019-04-18

## 2019-04-18 VITALS
BODY MASS INDEX: 29.93 KG/M2 | DIASTOLIC BLOOD PRESSURE: 80 MMHG | HEIGHT: 72 IN | HEART RATE: 82 BPM | WEIGHT: 221 LBS | SYSTOLIC BLOOD PRESSURE: 130 MMHG

## 2019-04-18 DIAGNOSIS — Z99.89 OSA ON CPAP: ICD-10-CM

## 2019-04-18 DIAGNOSIS — G47.33 OSA ON CPAP: ICD-10-CM

## 2019-04-18 DIAGNOSIS — H93.8X2 SENSATION OF FULLNESS IN LEFT EAR: ICD-10-CM

## 2019-04-18 DIAGNOSIS — R41.82 ALTERED MENTAL STATUS, UNSPECIFIED ALTERED MENTAL STATUS TYPE: ICD-10-CM

## 2019-04-18 DIAGNOSIS — Z86.73 HISTORY OF TIA (TRANSIENT ISCHEMIC ATTACK): Primary | ICD-10-CM

## 2019-04-18 DIAGNOSIS — E78.5 HYPERLIPIDEMIA, UNSPECIFIED HYPERLIPIDEMIA TYPE: ICD-10-CM

## 2019-04-18 PROCEDURE — 99214 OFFICE O/P EST MOD 30 MIN: CPT | Performed by: CLINICAL NURSE SPECIALIST

## 2019-04-18 NOTE — PATIENT INSTRUCTIONS
Stroke Prevention  Some medical conditions and behaviors are associated with a higher chance of having a stroke. You can help prevent a stroke by making nutrition, lifestyle, and other changes, including managing any medical conditions you may have.  What nutrition changes can be made?  · Eat healthy foods. You can do this by:  ? Choosing foods high in fiber, such as fresh fruits and vegetables and whole grains.  ? Eating at least 5 or more servings of fruits and vegetables a day. Try to fill half of your plate at each meal with fruits and vegetables.  ? Choosing lean protein foods, such as lean cuts of meat, poultry without skin, fish, tofu, beans, and nuts.  ? Eating low-fat dairy products.  ? Avoiding foods that are high in salt (sodium). This can help lower blood pressure.  ? Avoiding foods that have saturated fat, trans fat, and cholesterol. This can help prevent high cholesterol.  ? Avoiding processed and premade foods.  · Follow your health care provider's specific guidelines for losing weight, controlling high blood pressure (hypertension), lowering high cholesterol, and managing diabetes. These may include:  ? Reducing your daily calorie intake.  ? Limiting your daily sodium intake to 1,500 milligrams (mg).  ? Using only healthy fats for cooking, such as olive oil, canola oil, or sunflower oil.  ? Counting your daily carbohydrate intake.  What lifestyle changes can be made?  · Maintain a healthy weight. Talk to your health care provider about your ideal weight.  · Get at least 30 minutes of moderate physical activity at least 5 days a week. Moderate activity includes brisk walking, biking, and swimming.  · Do not use any products that contain nicotine or tobacco, such as cigarettes and e-cigarettes. If you need help quitting, ask your health care provider. It may also be helpful to avoid exposure to secondhand smoke.  · Limit alcohol intake to no more than 1 drink a day for nonpregnant women and 2 drinks a  day for men. One drink equals 12 oz of beer, 5 oz of wine, or 1½ oz of hard liquor.  · Stop any illegal drug use.  · Avoid taking birth control pills. Talk to your health care provider about the risks of taking birth control pills if:  ? You are over 35 years old.  ? You smoke.  ? You get migraines.  ? You have ever had a blood clot.  What other changes can be made?  · Manage your cholesterol levels.  ? Eating a healthy diet is important for preventing high cholesterol. If cholesterol cannot be managed through diet alone, you may also need to take medicines.  ? Take any prescribed medicines to control your cholesterol as told by your health care provider.  · Manage your diabetes.  ? Eating a healthy diet and exercising regularly are important parts of managing your blood sugar. If your blood sugar cannot be managed through diet and exercise, you may need to take medicines.  ? Take any prescribed medicines to control your diabetes as told by your health care provider.  · Control your hypertension.  ? To reduce your risk of stroke, try to keep your blood pressure below 130/80.  ? Eating a healthy diet and exercising regularly are an important part of controlling your blood pressure. If your blood pressure cannot be managed through diet and exercise, you may need to take medicines.  ? Take any prescribed medicines to control hypertension as told by your health care provider.  ? Ask your health care provider if you should monitor your blood pressure at home.  ? Have your blood pressure checked every year, even if your blood pressure is normal. Blood pressure increases with age and some medical conditions.  · Get evaluated for sleep disorders (sleep apnea). Talk to your health care provider about getting a sleep evaluation if you snore a lot or have excessive sleepiness.  · Take over-the-counter and prescription medicines only as told by your health care provider. Aspirin or blood thinners (antiplatelets or  anticoagulants) may be recommended to reduce your risk of forming blood clots that can lead to stroke.  · Make sure that any other medical conditions you have, such as atrial fibrillation or atherosclerosis, are managed.  What are the warning signs of a stroke?  The warning signs of a stroke can be easily remembered as BEFAST.  · B is for balance. Signs include:  ? Dizziness.  ? Loss of balance or coordination.  ? Sudden trouble walking.  · E is for eyes. Signs include:  ? A sudden change in vision.  ? Trouble seeing.  · F is for face. Signs include:  ? Sudden weakness or numbness of the face.  ? The face or eyelid drooping to one side.  · A is for arms. Signs include:  ? Sudden weakness or numbness of the arm, usually on one side of the body.  · S is for speech. Signs include:  ? Trouble speaking (aphasia).  ? Trouble understanding.  · T is for time.  ? These symptoms may represent a serious problem that is an emergency. Do not wait to see if the symptoms will go away. Get medical help right away. Call your local emergency services (911 in the [place] Do not drive yourself to the hospital.  · Other signs of stroke may include:  ? A sudden, severe headache with no known cause.  ? Nausea or vomiting.  ? Seizure.    Where to find more information  For more information, visit:  · American Stroke Association: www.strokeassociation.org  · National Stroke Association: www.stroke.org    Summary  · You can prevent a stroke by eating healthy, exercising, not smoking, limiting alcohol intake, and managing any medical conditions you may have.  · Do not use any products that contain nicotine or tobacco, such as cigarettes and e-cigarettes. If you need help quitting, ask your health care provider. It may also be helpful to avoid exposure to secondhand smoke.  · Remember BEFAST for warning signs of stroke. Get help right away if you or a loved one has any of these signs.  This information is not intended to replace advice given to  you by your health care provider. Make sure you discuss any questions you have with your health care provider.  Document Released: 01/25/2006 Document Revised: 01/23/2018 Document Reviewed: 01/23/2018  [employer] Patient Education © 2019 [employer]  BMI for Adults  Body mass index (BMI) is a number that is calculated from a person's weight and height. In most adults, the number is used to find how much of an adult's weight is made up of fat. BMI is not as accurate as a direct measure of body fat.  How is BMI calculated?  BMI is calculated by dividing weight in kilograms by height in meters squared. It can also be calculated by dividing weight in pounds by height in inches squared, then multiplying the resulting number by 703. Charts are available to help you find your BMI quickly and easily without doing this calculation.  How is BMI interpreted?  Health care professionals use BMI charts to identify whether an adult is underweight, at a normal weight, or overweight based on the following guidelines:  · Underweight: BMI less than 18.5.  · Normal weight: BMI between 18.5 and 24.9.  · Overweight: BMI between 25 and 29.9.  · Obese: BMI of 30 and above.    BMI is usually interpreted the same for males and females.  Weight includes both fat and muscle, so someone with a muscular build, such as an athlete, may have a BMI that is higher than 24.9. In cases like these, BMI may not accurately depict body fat. To determine if excess body fat is the cause of a BMI of 25 or higher, further assessments may need to be done by a health care provider.  Why is BMI a useful tool?  BMI is used to identify a possible weight problem that may be related to a medical problem or may increase the risk for medical problems. BMI can also be used to promote changes to reach a healthy weight.  This information is not intended to replace advice given to you by your health care provider. Make sure you discuss any questions you have  with your health care provider.  Document Released: 08/29/2005 Document Revised: 04/27/2017 Document Reviewed: 05/15/2015  [employer] Interactive Patient Education © 2018 [employer]
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
## 2019-04-19 ENCOUNTER — TELEPHONE (OUTPATIENT)
Dept: UROLOGY | Age: 69
End: 2019-04-19

## 2019-04-22 ENCOUNTER — TELEPHONE (OUTPATIENT)
Dept: UROLOGY | Age: 69
End: 2019-04-22
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
## 2019-04-24 ENCOUNTER — DOCUMENTATION (OUTPATIENT)
Dept: NEUROLOGY | Facility: CLINIC | Age: 69
End: 2019-04-24

## 2019-04-24 NOTE — PROGRESS NOTES
I received compliance download dated 3/20/19-4/18/19. Patient is 905 compliance 4 or more hours. Minimal pressure 13 cm H2O and max pressure 18cm H20. AHI 3.2. I spent at least 20 minute reviewing compliance download.

## 2019-04-25 ENCOUNTER — OFFICE VISIT (OUTPATIENT)
Dept: UROLOGY | Facility: CLINIC | Age: 69
End: 2019-04-25

## 2019-04-25 VITALS — BODY MASS INDEX: 28.44 KG/M2 | HEIGHT: 72 IN | WEIGHT: 210 LBS | TEMPERATURE: 97.5 F

## 2019-04-25 DIAGNOSIS — N40.1 BPH WITH URINARY OBSTRUCTION: ICD-10-CM

## 2019-04-25 DIAGNOSIS — N20.0 NEPHROLITHIASIS: ICD-10-CM

## 2019-04-25 DIAGNOSIS — N28.1 RENAL CYST: Primary | ICD-10-CM

## 2019-04-25 DIAGNOSIS — N13.8 BPH WITH URINARY OBSTRUCTION: ICD-10-CM

## 2019-04-25 LAB
BILIRUB BLD-MCNC: NEGATIVE MG/DL
CLARITY, POC: CLEAR
COLOR UR: YELLOW
GLUCOSE UR STRIP-MCNC: NEGATIVE MG/DL
KETONES UR QL: NEGATIVE
LEUKOCYTE EST, POC: NEGATIVE
NITRITE UR-MCNC: NEGATIVE MG/ML
PH UR: 5.5 [PH] (ref 5–8)
PROT UR STRIP-MCNC: NEGATIVE MG/DL
RBC # UR STRIP: NEGATIVE /UL
SP GR UR: 1.02 (ref 1–1.03)
UROBILINOGEN UR QL: NORMAL

## 2019-04-25 PROCEDURE — 81001 URINALYSIS AUTO W/SCOPE: CPT | Performed by: UROLOGY

## 2019-04-25 PROCEDURE — 99204 OFFICE O/P NEW MOD 45 MIN: CPT | Performed by: UROLOGY

## 2019-04-25 NOTE — PATIENT INSTRUCTIONS

## 2019-04-30 ENCOUNTER — HOSPITAL ENCOUNTER (OUTPATIENT)
Dept: NEUROLOGY | Facility: HOSPITAL | Age: 69
Discharge: HOME | End: 2019-04-30
Admitting: CLINICAL NURSE SPECIALIST

## 2019-04-30 DIAGNOSIS — Z86.73 HISTORY OF TIA (TRANSIENT ISCHEMIC ATTACK): ICD-10-CM

## 2019-04-30 DIAGNOSIS — R41.82 ALTERED MENTAL STATUS, UNSPECIFIED ALTERED MENTAL STATUS TYPE: ICD-10-CM

## 2019-04-30 PROCEDURE — 95816 EEG AWAKE AND DROWSY: CPT | Performed by: PSYCHIATRY & NEUROLOGY

## 2019-04-30 PROCEDURE — 95816 EEG AWAKE AND DROWSY: CPT

## 2019-05-16 ENCOUNTER — OFFICE VISIT (OUTPATIENT)
Dept: OTOLARYNGOLOGY | Facility: CLINIC | Age: 69
End: 2019-05-16

## 2019-05-16 ENCOUNTER — PROCEDURE VISIT (OUTPATIENT)
Dept: OTOLARYNGOLOGY | Facility: CLINIC | Age: 69
End: 2019-05-16

## 2019-05-16 VITALS
TEMPERATURE: 97.7 F | BODY MASS INDEX: 29.53 KG/M2 | DIASTOLIC BLOOD PRESSURE: 74 MMHG | SYSTOLIC BLOOD PRESSURE: 131 MMHG | HEIGHT: 72 IN | WEIGHT: 218 LBS | HEART RATE: 74 BPM

## 2019-05-16 DIAGNOSIS — H90.3 SENSORINEURAL HEARING LOSS (SNHL) OF BOTH EARS: Primary | ICD-10-CM

## 2019-05-16 DIAGNOSIS — H60.503 ACUTE OTITIS EXTERNA OF BOTH EARS, UNSPECIFIED TYPE: Primary | ICD-10-CM

## 2019-05-16 DIAGNOSIS — H62.40 ACUTE FUNGAL OTITIS EXTERNA: ICD-10-CM

## 2019-05-16 DIAGNOSIS — B36.9 ACUTE FUNGAL OTITIS EXTERNA: ICD-10-CM

## 2019-05-16 DIAGNOSIS — L92.9 GRANULATION TISSUE: ICD-10-CM

## 2019-05-16 PROCEDURE — 99214 OFFICE O/P EST MOD 30 MIN: CPT | Performed by: NURSE PRACTITIONER

## 2019-05-16 PROCEDURE — 92567 TYMPANOMETRY: CPT | Performed by: AUDIOLOGIST-HEARING AID FITTER

## 2019-05-16 PROCEDURE — 92557 COMPREHENSIVE HEARING TEST: CPT | Performed by: AUDIOLOGIST-HEARING AID FITTER

## 2019-05-16 RX ORDER — HYDROCORTISONE AND ACETIC ACID 20.75; 10.375 MG/ML; MG/ML
SOLUTION AURICULAR (OTIC)
Qty: 35 ML | Refills: 2 | Status: SHIPPED | OUTPATIENT
Start: 2019-05-16 | End: 2019-08-08
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
## 2019-05-28 ENCOUNTER — OFFICE VISIT (OUTPATIENT)
Dept: NEUROLOGY | Facility: CLINIC | Age: 69
End: 2019-05-28

## 2019-05-28 VITALS
BODY MASS INDEX: 29.53 KG/M2 | HEIGHT: 72 IN | WEIGHT: 218 LBS | SYSTOLIC BLOOD PRESSURE: 138 MMHG | HEART RATE: 72 BPM | DIASTOLIC BLOOD PRESSURE: 82 MMHG

## 2019-05-28 DIAGNOSIS — E78.2 MIXED HYPERLIPIDEMIA: ICD-10-CM

## 2019-05-28 DIAGNOSIS — G47.33 OSA ON CPAP: ICD-10-CM

## 2019-05-28 DIAGNOSIS — Z86.73 HISTORY OF TIA (TRANSIENT ISCHEMIC ATTACK): Primary | ICD-10-CM

## 2019-05-28 DIAGNOSIS — Z99.89 OSA ON CPAP: ICD-10-CM

## 2019-05-28 PROCEDURE — 99213 OFFICE O/P EST LOW 20 MIN: CPT | Performed by: CLINICAL NURSE SPECIALIST

## 2019-05-28 NOTE — PATIENT INSTRUCTIONS
Sleep Apnea  Sleep apnea is a condition that affects breathing. People with sleep apnea have moments during sleep when their breathing pauses briefly or gets shallow. Sleep apnea can cause these symptoms:  · Trouble staying asleep.  · Sleepiness or tiredness during the day.  · Irritability.  · Loud snoring.  · Morning headaches.  · Trouble concentrating.  · Forgetting things.  · Less interest in sex.  · Being sleepy for no reason.  · Mood swings.  · Personality changes.  · Depression.  · Waking up a lot during the night to pee (urinate).  · Dry mouth.  · Sore throat.    Follow these instructions at home:  · Make any changes in your routine that your doctor recommends.  · Eat a healthy, well-balanced diet.  · Take over-the-counter and prescription medicines only as told by your doctor.  · Avoid using alcohol, calming medicines (sedatives), and narcotic medicines.  · Take steps to lose weight if you are overweight.  · If you were given a machine (device) to use while you sleep, use it only as told by your doctor.  · Do not use any tobacco products, such as cigarettes, chewing tobacco, and e-cigarettes. If you need help quitting, ask your doctor.  · Keep all follow-up visits as told by your doctor. This is important.  Contact a doctor if:  · The machine that you were given to use during sleep is uncomfortable or does not seem to be working.  · Your symptoms do not get better.  · Your symptoms get worse.  Get help right away if:  · Your chest hurts.  · You have trouble breathing in enough air (shortness of breath).  · You have an uncomfortable feeling in your back, arms, or stomach.  · You have trouble talking.  · One side of your body feels weak.  · A part of your face is hanging down (drooping).  These symptoms may be an emergency. Do not wait to see if the symptoms will go away. Get medical help right away. Call your local emergency services (911 in the [place] Do not drive yourself to the hospital.  This information  is not intended to replace advice given to you by your health care provider. Make sure you discuss any questions you have with your health care provider.  Document Released: 09/26/2009 Document Revised: 07/16/2018 Document Reviewed: 09/26/2016  [employer] Interactive Patient Education © 2019 [employer]  Stroke Prevention  Some medical conditions and behaviors are associated with a higher chance of having a stroke. You can help prevent a stroke by making nutrition, lifestyle, and other changes, including managing any medical conditions you may have.  What nutrition changes can be made?  · Eat healthy foods. You can do this by:  ? Choosing foods high in fiber, such as fresh fruits and vegetables and whole grains.  ? Eating at least 5 or more servings of fruits and vegetables a day. Try to fill half of your plate at each meal with fruits and vegetables.  ? Choosing lean protein foods, such as lean cuts of meat, poultry without skin, fish, tofu, beans, and nuts.  ? Eating low-fat dairy products.  ? Avoiding foods that are high in salt (sodium). This can help lower blood pressure.  ? Avoiding foods that have saturated fat, trans fat, and cholesterol. This can help prevent high cholesterol.  ? Avoiding processed and premade foods.  · Follow your health care provider's specific guidelines for losing weight, controlling high blood pressure (hypertension), lowering high cholesterol, and managing diabetes. These may include:  ? Reducing your daily calorie intake.  ? Limiting your daily sodium intake to 1,500 milligrams (mg).  ? Using only healthy fats for cooking, such as olive oil, canola oil, or sunflower oil.  ? Counting your daily carbohydrate intake.  What lifestyle changes can be made?  · Maintain a healthy weight. Talk to your health care provider about your ideal weight.  · Get at least 30 minutes of moderate physical activity at least 5 days a week. Moderate activity includes brisk walking, biking, and  swimming.  · Do not use any products that contain nicotine or tobacco, such as cigarettes and e-cigarettes. If you need help quitting, ask your health care provider. It may also be helpful to avoid exposure to secondhand smoke.  · Limit alcohol intake to no more than 1 drink a day for nonpregnant women and 2 drinks a day for men. One drink equals 12 oz of beer, 5 oz of wine, or 1½ oz of hard liquor.  · Stop any illegal drug use.  · Avoid taking birth control pills. Talk to your health care provider about the risks of taking birth control pills if:  ? You are over 35 years old.  ? You smoke.  ? You get migraines.  ? You have ever had a blood clot.  What other changes can be made?  · Manage your cholesterol levels.  ? Eating a healthy diet is important for preventing high cholesterol. If cholesterol cannot be managed through diet alone, you may also need to take medicines.  ? Take any prescribed medicines to control your cholesterol as told by your health care provider.  · Manage your diabetes.  ? Eating a healthy diet and exercising regularly are important parts of managing your blood sugar. If your blood sugar cannot be managed through diet and exercise, you may need to take medicines.  ? Take any prescribed medicines to control your diabetes as told by your health care provider.  · Control your hypertension.  ? To reduce your risk of stroke, try to keep your blood pressure below 130/80.  ? Eating a healthy diet and exercising regularly are an important part of controlling your blood pressure. If your blood pressure cannot be managed through diet and exercise, you may need to take medicines.  ? Take any prescribed medicines to control hypertension as told by your health care provider.  ? Ask your health care provider if you should monitor your blood pressure at home.  ? Have your blood pressure checked every year, even if your blood pressure is normal. Blood pressure increases with age and some medical  conditions.  · Get evaluated for sleep disorders (sleep apnea). Talk to your health care provider about getting a sleep evaluation if you snore a lot or have excessive sleepiness.  · Take over-the-counter and prescription medicines only as told by your health care provider. Aspirin or blood thinners (antiplatelets or anticoagulants) may be recommended to reduce your risk of forming blood clots that can lead to stroke.  · Make sure that any other medical conditions you have, such as atrial fibrillation or atherosclerosis, are managed.  What are the warning signs of a stroke?  The warning signs of a stroke can be easily remembered as BEFAST.  · B is for balance. Signs include:  ? Dizziness.  ? Loss of balance or coordination.  ? Sudden trouble walking.  · E is for eyes. Signs include:  ? A sudden change in vision.  ? Trouble seeing.  · F is for face. Signs include:  ? Sudden weakness or numbness of the face.  ? The face or eyelid drooping to one side.  · A is for arms. Signs include:  ? Sudden weakness or numbness of the arm, usually on one side of the body.  · S is for speech. Signs include:  ? Trouble speaking (aphasia).  ? Trouble understanding.  · T is for time.  ? These symptoms may represent a serious problem that is an emergency. Do not wait to see if the symptoms will go away. Get medical help right away. Call your local emergency services (911 in the [place] Do not drive yourself to the hospital.  · Other signs of stroke may include:  ? A sudden, severe headache with no known cause.  ? Nausea or vomiting.  ? Seizure.    Where to find more information  For more information, visit:  · American Stroke Association: www.strokeassociation.org  · National Stroke Association: www.stroke.org    Summary  · You can prevent a stroke by eating healthy, exercising, not smoking, limiting alcohol intake, and managing any medical conditions you may have.  · Do not use any products that contain nicotine or tobacco, such as  cigarettes and e-cigarettes. If you need help quitting, ask your health care provider. It may also be helpful to avoid exposure to secondhand smoke.  · Remember BEFAST for warning signs of stroke. Get help right away if you or a loved one has any of these signs.  This information is not intended to replace advice given to you by your health care provider. Make sure you discuss any questions you have with your health care provider.  Document Released: 01/25/2006 Document Revised: 01/23/2018 Document Reviewed: 01/23/2018  [employer] Interactive Patient Education © 2019 [employer]  BMI for Adults  Body mass index (BMI) is a number that is calculated from a person's weight and height. In most adults, the number is used to find how much of an adult's weight is made up of fat. BMI is not as accurate as a direct measure of body fat.  How is BMI calculated?  BMI is calculated by dividing weight in kilograms by height in meters squared. It can also be calculated by dividing weight in pounds by height in inches squared, then multiplying the resulting number by 703. Charts are available to help you find your BMI quickly and easily without doing this calculation.  How is BMI interpreted?  Health care professionals use BMI charts to identify whether an adult is underweight, at a normal weight, or overweight based on the following guidelines:  · Underweight: BMI less than 18.5.  · Normal weight: BMI between 18.5 and 24.9.  · Overweight: BMI between 25 and 29.9.  · Obese: BMI of 30 and above.    BMI is usually interpreted the same for males and females.  Weight includes both fat and muscle, so someone with a muscular build, such as an athlete, may have a BMI that is higher than 24.9. In cases like these, BMI may not accurately depict body fat. To determine if excess body fat is the cause of a BMI of 25 or higher, further assessments may need to be done by a health care provider.  Why is BMI a useful tool?  BMI is used to identify  a possible weight problem that may be related to a medical problem or may increase the risk for medical problems. BMI can also be used to promote changes to reach a healthy weight.  This information is not intended to replace advice given to you by your health care provider. Make sure you discuss any questions you have with your health care provider.  Document Released: 08/29/2005 Document Revised: 04/27/2017 Document Reviewed: 05/15/2015  [email or web address] Interactive Patient Education © 2018 [employer]
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
## 2019-06-13 ENCOUNTER — HOSPITAL ENCOUNTER (OUTPATIENT)
Dept: SLEEP MEDICINE | Facility: HOSPITAL | Age: 69
End: 2019-06-13

## 2019-06-19 ENCOUNTER — OFFICE VISIT (OUTPATIENT)
Dept: OTOLARYNGOLOGY | Facility: CLINIC | Age: 69
End: 2019-06-19

## 2019-06-19 VITALS
HEIGHT: 72 IN | TEMPERATURE: 98.1 F | SYSTOLIC BLOOD PRESSURE: 140 MMHG | WEIGHT: 219.38 LBS | DIASTOLIC BLOOD PRESSURE: 77 MMHG | HEART RATE: 76 BPM | BODY MASS INDEX: 29.71 KG/M2

## 2019-06-19 DIAGNOSIS — H61.23 BILATERAL IMPACTED CERUMEN: Primary | ICD-10-CM

## 2019-06-19 DIAGNOSIS — H62.40 ACUTE FUNGAL OTITIS EXTERNA: ICD-10-CM

## 2019-06-19 DIAGNOSIS — B36.9 ACUTE FUNGAL OTITIS EXTERNA: ICD-10-CM

## 2019-06-19 PROCEDURE — 99213 OFFICE O/P EST LOW 20 MIN: CPT | Performed by: NURSE PRACTITIONER

## 2019-06-19 NOTE — PATIENT INSTRUCTIONS
Dry ear precautions    Call for problems or worsening symptoms    Followup in October before [place] trip
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
## 2019-07-02 ENCOUNTER — HOSPITAL ENCOUNTER (OUTPATIENT)
Dept: SLEEP MEDICINE | Facility: HOSPITAL | Age: 69
Discharge: HOME | End: 2019-07-02
Admitting: CLINICAL NURSE SPECIALIST

## 2019-07-02 VITALS
HEART RATE: 75 BPM | BODY MASS INDEX: 29.12 KG/M2 | OXYGEN SATURATION: 94 % | DIASTOLIC BLOOD PRESSURE: 61 MMHG | HEIGHT: 72 IN | SYSTOLIC BLOOD PRESSURE: 131 MMHG | WEIGHT: 215 LBS

## 2019-07-02 DIAGNOSIS — R41.82 ALTERED MENTAL STATUS, UNSPECIFIED ALTERED MENTAL STATUS TYPE: ICD-10-CM

## 2019-07-02 DIAGNOSIS — G47.33 OSA ON CPAP: ICD-10-CM

## 2019-07-02 DIAGNOSIS — Z99.89 OSA ON CPAP: ICD-10-CM

## 2019-07-02 PROCEDURE — 95811 POLYSOM 6/>YRS CPAP 4/> PARM: CPT

## 2019-07-02 PROCEDURE — 95811 POLYSOM 6/>YRS CPAP 4/> PARM: CPT | Performed by: PSYCHIATRY & NEUROLOGY

## 2019-07-30 ENCOUNTER — RESULTS ENCOUNTER (OUTPATIENT)
Dept: UROLOGY | Facility: CLINIC | Age: 69
End: 2019-07-30

## 2019-07-30 DIAGNOSIS — N13.8 BPH WITH OBSTRUCTION/LOWER URINARY TRACT SYMPTOMS: ICD-10-CM

## 2019-07-30 DIAGNOSIS — N40.1 BPH WITH OBSTRUCTION/LOWER URINARY TRACT SYMPTOMS: ICD-10-CM

## 2019-08-08 ENCOUNTER — OFFICE VISIT (OUTPATIENT)
Dept: INTERNAL MEDICINE | Facility: CLINIC | Age: 69
End: 2019-08-08

## 2019-08-08 VITALS
HEART RATE: 93 BPM | OXYGEN SATURATION: 96 % | BODY MASS INDEX: 29.59 KG/M2 | WEIGHT: 218.5 LBS | HEIGHT: 72 IN | SYSTOLIC BLOOD PRESSURE: 134 MMHG | RESPIRATION RATE: 16 BRPM | DIASTOLIC BLOOD PRESSURE: 76 MMHG

## 2019-08-08 DIAGNOSIS — E78.2 MIXED HYPERLIPIDEMIA: ICD-10-CM

## 2019-08-08 DIAGNOSIS — G47.33 OSA ON CPAP: ICD-10-CM

## 2019-08-08 DIAGNOSIS — Z99.89 OSA ON CPAP: ICD-10-CM

## 2019-08-08 DIAGNOSIS — E66.3 OVERWEIGHT (BMI 25.0-29.9): ICD-10-CM

## 2019-08-08 DIAGNOSIS — Z23 NEED FOR VACCINATION: ICD-10-CM

## 2019-08-08 DIAGNOSIS — Z86.73 HISTORY OF TIA (TRANSIENT ISCHEMIC ATTACK): Primary | ICD-10-CM

## 2019-08-08 PROCEDURE — G0009 ADMIN PNEUMOCOCCAL VACCINE: HCPCS | Performed by: INTERNAL MEDICINE

## 2019-08-08 PROCEDURE — 90732 PPSV23 VACC 2 YRS+ SUBQ/IM: CPT | Performed by: INTERNAL MEDICINE

## 2019-08-08 PROCEDURE — 96160 PT-FOCUSED HLTH RISK ASSMT: CPT | Performed by: INTERNAL MEDICINE

## 2019-08-08 PROCEDURE — 99214 OFFICE O/P EST MOD 30 MIN: CPT | Performed by: INTERNAL MEDICINE

## 2019-08-08 PROCEDURE — G0438 PPPS, INITIAL VISIT: HCPCS | Performed by: INTERNAL MEDICINE

## 2019-08-08 RX ORDER — ATORVASTATIN CALCIUM 10 MG/1
10 TABLET, FILM COATED ORAL DAILY
Qty: 90 TABLET | Refills: 3 | Status: SHIPPED | OUTPATIENT
Start: 2019-08-08 | End: 2020-10-27

## 2019-08-08 NOTE — PROGRESS NOTES
"CC: F/U TIA    History:  Rasheed Arevalo is a 69 y.o. male   He notes he has been doing well after TIA earlier this year. He had some events in April and May, but has had no symptoms at all since then. He has had lipids well controlled on atorvastatin with LDL <70 on last check. He is compliant on CPAP therapy for NAVNEET without symptoms.     ROS:  Review of Systems   Constitutional: Negative for chills and fever.   Respiratory: Negative for cough and shortness of breath.    Cardiovascular: Negative for chest pain and palpitations.   Gastrointestinal: Negative for abdominal pain and constipation.   Genitourinary: Negative for difficulty urinating and dysuria.        reports that he quit smoking about 49 years ago. He started smoking about 53 years ago. He has a 6.00 pack-year smoking history. He has never used smokeless tobacco. He reports that he does not drink alcohol or use drugs.      Current Outpatient Medications:   •  acetic acid-hydrocortisone (VOSOL-HC) 1-2 % otic solution, 3-4 drops in the affected ear 3 times per day, Disp: 35 mL, Rfl: 2  •  aspirin 81 MG chewable tablet, Chew 1 tablet Daily., Disp: 90 tablet, Rfl: 3  •  atorvastatin (LIPITOR) 10 MG tablet, Take 1 tablet by mouth Daily., Disp: 90 tablet, Rfl: 3    OBJECTIVE:  /76 (BP Location: Left arm, Patient Position: Sitting, Cuff Size: Adult)   Pulse 93   Resp 16   Ht 182.9 cm (72\")   Wt 99.1 kg (218 lb 8 oz)   SpO2 96%   BMI 29.63 kg/m²    Physical Exam   Constitutional: He is oriented to person, place, and time. He appears well-nourished. No distress.   Cardiovascular: Normal rate, regular rhythm and normal heart sounds.   No murmur heard.  Pulmonary/Chest: Effort normal and breath sounds normal. He has no wheezes.   Neurological: He is alert and oriented to person, place, and time.   Psychiatric: He has a normal mood and affect.       Assessment/Plan    Diagnoses and all orders for this visit:    History of TIA (transient ischemic " attack)  -     Comprehensive Metabolic Panel; Future  -     CBC (No Diff); Future  -     atorvastatin (LIPITOR) 10 MG tablet; Take 1 tablet by mouth Daily.  Stable without symptoms on ASA & statin for secondary prophylaxis.     Mixed hyperlipidemia  -     Lipid Panel; Future  -     atorvastatin (LIPITOR) 10 MG tablet; Take 1 tablet by mouth Daily.  Stable on moderate intensity statin therapy per ACC/AHA guidelines.    NAVNEET on CPAP  Compliant with CPAP. No symptoms at this time.     Overweight (BMI 25.0-29.9)  Recommended attention to portion control and being careful about the types and timing of meals for the purpose of weight management.    Need for vaccination  -     Pneumococcal Polysaccharide Vaccine 23-Valent Greater Than or Equal To 3yo Subcutaneous / IM    An After Visit Summary was printed and given to the patient at discharge.  Return in about 1 year (around 8/8/2020) for Medicare Wellness.         Wild Alatorre D.O. 8/8/2019   Electronically signed.

## 2019-08-08 NOTE — PROGRESS NOTES
The ABCs of the Annual Wellness Visit  Initial Medicare Wellness Visit    No chief complaint on file.  See  note    Subjective   History of Present Illness:  Rasheed Arevalo is a 69 y.o. male who presents for an Initial Medicare Wellness Visit.    HEALTH RISK ASSESSMENT    Recent Hospitalizations:  Recently treated at the following:  [facility]    Current Medical Providers:  Patient Care Team:  Wild Alatorre DO as PCP - General (Internal Medicine)  Shaye Acevedo APRN as Nurse Practitioner (Otolaryngology)  Gavin Kitchen MD as Consulting Physician (Otolaryngology)    Smoking Status:  Social History     Tobacco Use   Smoking Status Former Smoker   • Packs/day: 1.00   • Years: 6.00   • Pack years: 6.00   • Start date: 1966   • Last attempt to quit: 1970   • Years since quittin.6   Smokeless Tobacco Never Used   Tobacco Comment    Smoked cigarettes when young.  Quit in college.  Never since.       Alcohol Consumption:  Social History     Substance and Sexual Activity   Alcohol Use No       Depression Screen:   PHQ-2/PHQ-9 Depression Screening 2019   Little interest or pleasure in doing things 0   Feeling down, depressed, or hopeless 0   Total Score 0       Fall Risk Screen:  STEADI Fall Risk Assessment has not been completed.    Health Habits and Functional and Cognitive Screening:  Functional & Cognitive Status 2019   Do you have difficulty preparing food and eating? -   Do you have difficulty bathing yourself, getting dressed or grooming yourself? -   Do you have difficulty using the toilet? -   Do you have difficulty moving around from place to place? -   Do you have trouble with steps or getting out of a bed or a chair? -   Current Diet -   Dental Exam -   Eye Exam -   Exercise (times per week) -   Current Exercise Activities Include -   Do you need help using the phone?  -   Are you deaf or do you have serious difficulty hearing?  -   Do you need help with  transportation? -   Do you need help shopping? -   Do you need help preparing meals?  -   Do you need help with housework?  -   Do you need help with laundry? -   Do you need help taking your medications? -   Do you need help managing money? -   Do you ever drive or ride in a car without wearing a seat belt? No   Have you felt unusual stress, anger or loneliness in the last month? -   Who do you live with? -   If you need help, do you have trouble finding someone available to you? -   Have you been bothered in the last four weeks by sexual problems? -   Do you have difficulty concentrating, remembering or making decisions? -         Does the patient have evidence of cognitive impairment? No    Asprin use counseling:Taking ASA appropriately as indicated    Age-appropriate Screening Schedule:  Refer to the list below for future screening recommendations based on patient's age, sex and/or medical conditions. Orders for these recommended tests are listed in the plan section. The patient has been provided with a written plan.    Health Maintenance   Topic Date Due   • ZOSTER VACCINE (2 of 3) 02/26/2012   • INFLUENZA VACCINE  08/01/2019   • LIPID PANEL  04/08/2020   • COLONOSCOPY  12/29/2020   • TDAP/TD VACCINES (2 - Td) 08/16/2027   • PNEUMOCOCCAL VACCINES (65+ LOW/MEDIUM RISK)  Completed          The following portions of the patient's history were reviewed and updated as appropriate: allergies, current medications, past family history, past medical history, past social history, past surgical history and problem list.    Outpatient Medications Prior to Visit   Medication Sig Dispense Refill   • aspirin 81 MG chewable tablet Chew 1 tablet Daily. 90 tablet 3   • acetic acid-hydrocortisone (VOSOL-HC) 1-2 % otic solution 3-4 drops in the affected ear 3 times per day 35 mL 2   • atorvastatin (LIPITOR) 10 MG tablet Take 1 tablet by mouth Daily. 90 tablet 3     No facility-administered medications prior to visit.   "      Patient Active Problem List   Diagnosis   • Ataxia   • Mixed hyperlipidemia   • History of TIA (transient ischemic attack)   • Renal cysts, acquired, bilateral   • Benign non-nodular prostatic hyperplasia without lower urinary tract symptoms   • Chronic non-seasonal allergic rhinitis   • History of kidney stones   • Overweight (BMI 25.0-29.9)   • Supraventricular tachycardia (CMS/HCC)   • NAVNEET on CPAP       Advanced Care Planning:  Patient has an advance directive - a copy has been provided and is visible in patient header    Review of Systems See  note    Compared to one year ago, the patient feels his physical health is the same.  Compared to one year ago, the patient feels his mental health is the same.    Reviewed chart for potential of high risk medication in the elderly: yes  Reviewed chart for potential of harmful drug interactions in the elderly:yes    Objective         Vitals:    08/08/19 1339   BP: 134/76   BP Location: Left arm   Patient Position: Sitting   Cuff Size: Adult   Pulse: 93   Resp: 16   SpO2: 96%   Weight: 99.1 kg (218 lb 8 oz)   Height: 182.9 cm (72\")       Body mass index is 29.63 kg/m².  Discussed the patient's BMI with him. The BMI is above average; BMI management plan is completed.    Physical Exam See  note          Assessment/Plan   Medicare Risks and Personalized Health Plan  CMS Preventative Services Quick Reference  Colon Cancer Screening  Fall Risk  Immunizations Discussed/Encouraged (specific immunizations; Pneumococcal 23 and Shingrix )    The above risks/problems have been discussed with the patient.  Pertinent information has been shared with the patient in the After Visit Summary.  Follow up plans and orders are seen below in the Assessment/Plan Section.    Diagnoses and all orders for this visit:    1. History of TIA (transient ischemic attack) (Primary)  -     Comprehensive Metabolic Panel; Future  -     CBC (No Diff); Future  -     atorvastatin " (LIPITOR) 10 MG tablet; Take 1 tablet by mouth Daily.  Dispense: 90 tablet; Refill: 3    2. Mixed hyperlipidemia  -     Lipid Panel; Future  -     atorvastatin (LIPITOR) 10 MG tablet; Take 1 tablet by mouth Daily.  Dispense: 90 tablet; Refill: 3    3. NAVNEET on CPAP    4. Overweight (BMI 25.0-29.9)    5. Need for vaccination  -     Pneumococcal Polysaccharide Vaccine 23-Valent Greater Than or Equal To 3yo Subcutaneous / IM      Follow Up:  No Follow-up on file.     An After Visit Summary and PPPS were given to the patient.

## 2019-08-09 NOTE — PATIENT INSTRUCTIONS
Medicare Wellness  Personal Prevention Plan of Service     Date of Office Visit:  2019  Encounter Provider:  Wild Alatorre DO  Place of Service:  [facility]  Patient Name: aRsheed Arevalo  :  1950    As part of the Medicare Wellness portion of your visit today, we are providing you with this personalized preventive plan of services (PPPS). This plan is based upon recommendations of the [place] Preventive Services Task Force (USPSTF) and the Advisory Committee on Immunization Practices (ACIP).    This lists the preventive care services that should be considered, and provides dates of when you are due. Items listed as completed are up-to-date and do not require any further intervention.    Health Maintenance   Topic Date Due   • ZOSTER VACCINE (2 of 3) 2012   • MEDICARE ANNUAL WELLNESS  2018   • INFLUENZA VACCINE  2019   • LIPID PANEL  2020   • COLONOSCOPY  2020   • TDAP/TD VACCINES (2 - Td) 2027   • HEPATITIS C SCREENING  Completed   • PNEUMOCOCCAL VACCINES (65+ LOW/MEDIUM RISK)  Completed   • AAA SCREEN (ONE-TIME)  Completed       Orders Placed This Encounter   Procedures   • Pneumococcal Polysaccharide Vaccine 23-Valent Greater Than or Equal To 1yo Subcutaneous / IM   • Comprehensive Metabolic Panel     Standing Status:   Future     Standing Expiration Date:   2020   • Lipid Panel     Standing Status:   Future     Standing Expiration Date:   2020   • CBC (No Diff)     Standing Status:   Future     Standing Expiration Date:   2020       Return in about 1 year (around 2020) for Medicare Wellness.

## 2019-10-14 ENCOUNTER — TELEPHONE (OUTPATIENT)
Dept: UROLOGY | Facility: CLINIC | Age: 69
End: 2019-10-14

## 2019-10-14 NOTE — TELEPHONE ENCOUNTER
Mr. Anderson called from [place] to state that he has had blood in his seminal fluid for the past 2 days.  Asked for an appointment with Dr Pollard, but accepted an appointment with Laurie on Oct 23 since Dr Pollard is booked so far out.
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
## 2019-10-23 ENCOUNTER — APPOINTMENT (OUTPATIENT)
Dept: GENERAL RADIOLOGY | Facility: HOSPITAL | Age: 69
End: 2019-10-23

## 2019-10-23 ENCOUNTER — HOSPITAL ENCOUNTER (EMERGENCY)
Facility: HOSPITAL | Age: 69
Discharge: HOME | End: 2019-10-23
Admitting: EMERGENCY MEDICINE

## 2019-10-23 ENCOUNTER — OFFICE VISIT (OUTPATIENT)
Dept: UROLOGY | Facility: CLINIC | Age: 69
End: 2019-10-23

## 2019-10-23 VITALS
HEIGHT: 72 IN | DIASTOLIC BLOOD PRESSURE: 68 MMHG | WEIGHT: 218 LBS | OXYGEN SATURATION: 98 % | BODY MASS INDEX: 29.53 KG/M2 | RESPIRATION RATE: 17 BRPM | SYSTOLIC BLOOD PRESSURE: 112 MMHG | HEART RATE: 66 BPM | TEMPERATURE: 97.8 F

## 2019-10-23 VITALS — HEIGHT: 72 IN | BODY MASS INDEX: 29.53 KG/M2 | TEMPERATURE: 98.6 F | RESPIRATION RATE: 16 BRPM | WEIGHT: 218 LBS

## 2019-10-23 DIAGNOSIS — R36.1 HEMATOSPERMIA: Primary | ICD-10-CM

## 2019-10-23 DIAGNOSIS — Z48.89 ENCOUNTER FOR POST SURGICAL WOUND CHECK: Primary | ICD-10-CM

## 2019-10-23 LAB
BILIRUB BLD-MCNC: NEGATIVE MG/DL
CLARITY, POC: CLEAR
COLOR UR: YELLOW
GLUCOSE UR STRIP-MCNC: NEGATIVE MG/DL
KETONES UR QL: NEGATIVE
LEUKOCYTE EST, POC: NEGATIVE
NITRITE UR-MCNC: NEGATIVE MG/ML
PH UR: 6.5 [PH] (ref 5–8)
PROT UR STRIP-MCNC: NEGATIVE MG/DL
RBC # UR STRIP: NEGATIVE /UL
SP GR UR: 1.01 (ref 1–1.03)
UROBILINOGEN UR QL: NORMAL

## 2019-10-23 PROCEDURE — 73140 X-RAY EXAM OF FINGER(S): CPT

## 2019-10-23 PROCEDURE — 99282 EMERGENCY DEPT VISIT SF MDM: CPT

## 2019-10-23 PROCEDURE — 99213 OFFICE O/P EST LOW 20 MIN: CPT | Performed by: NURSE PRACTITIONER

## 2019-10-23 PROCEDURE — 81001 URINALYSIS AUTO W/SCOPE: CPT | Performed by: NURSE PRACTITIONER
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
## 2019-10-23 NOTE — PATIENT INSTRUCTIONS

## 2019-10-23 NOTE — DISCHARGE INSTRUCTIONS
Incision Care, Adult  An incision is a surgical cut that is made through your skin. Most incisions are closed after surgery. Your incision may be closed with stitches (sutures), staples, skin glue, or adhesive strips. You may need to return to your health care provider to have sutures or staples removed. This may occur several days to several weeks after your surgery. The incision needs to be cared for properly to prevent infection.  How to care for your incision  Incision care    · Follow instructions from your health care provider about how to take care of your incision. Make sure you:  ? Wash your hands with soap and water before you change the bandage (dressing). If soap and water are not available, use hand .  ? Change your dressing as told by your health care provider.  ? Leave sutures, skin glue, or adhesive strips in place. These skin closures may need to stay in place for 2 weeks or longer. If adhesive strip edges start to loosen and curl up, you may trim the loose edges. Do not remove adhesive strips completely unless your health care provider tells you to do that.  · Check your incision area every day for signs of infection. Check for:  ? More redness, swelling, or pain.  ? More fluid or blood.  ? Warmth.  ? Pus or a bad smell.  · Ask your health care provider how to clean the incision. This may include:  ? Using mild soap and water.  ? Using a clean towel to pat the incision dry after cleaning it.  ? Applying a cream or ointment. Do this only as told by your health care provider.  ? Covering the incision with a clean dressing.  · Ask your health care provider when you can leave the incision uncovered.  · Do not take baths, swim, or use a hot tub until your health care provider approves. Ask your health care provider if you can take showers. You may only be allowed to take sponge baths for bathing.  Medicines  · If you were prescribed an antibiotic medicine, cream, or ointment, take or apply the  antibiotic as told by your health care provider. Do not stop taking or applying the antibiotic even if your condition improves.  · Take over-the-counter and prescription medicines only as told by your health care provider.  General instructions  · Limit movement around your incision to improve healing.  ? Avoid straining, lifting, or exercise for the first month, or for as long as told by your health care provider.  ? Follow instructions from your health care provider about returning to your normal activities.  ? Ask your health care provider what activities are safe.  · Protect your incision from the sun when you are outside for the first 6 months, or for as long as told by your health care provider. Apply sunscreen around the scar or cover it up.  · Keep all follow-up visits as told by your health care provider. This is important.  Contact a health care provider if:  · Your have more redness, swelling, or pain around the incision.  · You have more fluid or blood coming from the incision.  · Your incision feels warm to the touch.  · You have pus or a bad smell coming from the incision.  · You have a fever or shaking chills.  · You are nauseous or you vomit.  · You are dizzy.  · Your sutures or staples come undone.  Get help right away if:  · You have a red streak coming from your incision.  · Your incision bleeds through the dressing and the bleeding does not stop with gentle pressure.  · The edges of your incision open up and separate.  · You have severe pain.  · You have a rash.  · You are confused.  · You faint.  · You have trouble breathing and a fast heartbeat.  This information is not intended to replace advice given to you by your health care provider. Make sure you discuss any questions you have with your health care provider.  Document Released: 07/07/2006 Document Revised: 08/25/2017 Document Reviewed: 07/05/2017  [employer] Patient Education © 2019 [employer]

## 2019-11-11 ENCOUNTER — OFFICE VISIT (OUTPATIENT)
Dept: OTOLARYNGOLOGY | Age: 69
End: 2019-11-11
Payer: MEDICARE

## 2019-11-11 VITALS
HEIGHT: 72 IN | WEIGHT: 219 LBS | DIASTOLIC BLOOD PRESSURE: 72 MMHG | BODY MASS INDEX: 29.66 KG/M2 | SYSTOLIC BLOOD PRESSURE: 134 MMHG

## 2019-11-11 DIAGNOSIS — H61.23 BILATERAL IMPACTED CERUMEN: Primary | ICD-10-CM

## 2019-11-11 PROCEDURE — 99213 OFFICE O/P EST LOW 20 MIN: CPT | Performed by: NURSE PRACTITIONER

## 2019-11-11 RX ORDER — ASPIRIN 81 MG/1
81 TABLET, CHEWABLE ORAL
COMMUNITY
Start: 2019-02-07 | End: 2021-06-15

## 2019-11-11 RX ORDER — ATORVASTATIN CALCIUM 10 MG/1
10 TABLET, FILM COATED ORAL
COMMUNITY
Start: 2019-08-08 | End: 2021-06-15

## 2019-11-11 ASSESSMENT — ENCOUNTER SYMPTOMS
GASTROINTESTINAL NEGATIVE: 1
ALLERGIC/IMMUNOLOGIC NEGATIVE: 1
EYES NEGATIVE: 1
RESPIRATORY NEGATIVE: 1

## 2019-12-02 ENCOUNTER — OFFICE VISIT (OUTPATIENT)
Dept: NEUROLOGY | Facility: CLINIC | Age: 69
End: 2019-12-02

## 2019-12-02 VITALS
OXYGEN SATURATION: 98 % | BODY MASS INDEX: 29.53 KG/M2 | HEIGHT: 72 IN | HEART RATE: 78 BPM | SYSTOLIC BLOOD PRESSURE: 126 MMHG | WEIGHT: 218 LBS | DIASTOLIC BLOOD PRESSURE: 80 MMHG

## 2019-12-02 DIAGNOSIS — Z99.89 OSA ON CPAP: Primary | ICD-10-CM

## 2019-12-02 DIAGNOSIS — H81.90 NYSTAGMUS ASSOCIATED WITH DISORDER OF THE VESTIBULAR SYSTEM: ICD-10-CM

## 2019-12-02 DIAGNOSIS — H55.09 NYSTAGMUS ASSOCIATED WITH DISORDER OF THE VESTIBULAR SYSTEM: ICD-10-CM

## 2019-12-02 DIAGNOSIS — H81.11 BPPV (BENIGN PAROXYSMAL POSITIONAL VERTIGO), RIGHT: ICD-10-CM

## 2019-12-02 DIAGNOSIS — G47.33 OSA ON CPAP: Primary | ICD-10-CM

## 2019-12-02 PROCEDURE — 99214 OFFICE O/P EST MOD 30 MIN: CPT | Performed by: PHYSICIAN ASSISTANT
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
## 2020-01-22 ENCOUNTER — OFFICE VISIT (OUTPATIENT)
Dept: OTOLARYNGOLOGY | Facility: CLINIC | Age: 70
End: 2020-01-22

## 2020-01-22 ENCOUNTER — PROCEDURE VISIT (OUTPATIENT)
Dept: OTOLARYNGOLOGY | Facility: CLINIC | Age: 70
End: 2020-01-22

## 2020-01-22 VITALS
SYSTOLIC BLOOD PRESSURE: 116 MMHG | HEART RATE: 64 BPM | DIASTOLIC BLOOD PRESSURE: 68 MMHG | HEIGHT: 72 IN | WEIGHT: 217.6 LBS | BODY MASS INDEX: 29.47 KG/M2 | TEMPERATURE: 97.3 F

## 2020-01-22 DIAGNOSIS — H92.12 OTORRHEA, LEFT: ICD-10-CM

## 2020-01-22 DIAGNOSIS — H62.40 ACUTE FUNGAL OTITIS EXTERNA: ICD-10-CM

## 2020-01-22 DIAGNOSIS — H55.09 NYSTAGMUS, END-POSITION: Primary | ICD-10-CM

## 2020-01-22 DIAGNOSIS — H61.23 BILATERAL IMPACTED CERUMEN: Primary | ICD-10-CM

## 2020-01-22 DIAGNOSIS — H90.3 HEARING LOSS SENSORY, BILATERAL: ICD-10-CM

## 2020-01-22 DIAGNOSIS — B36.9 ACUTE FUNGAL OTITIS EXTERNA: ICD-10-CM

## 2020-01-22 PROCEDURE — 99214 OFFICE O/P EST MOD 30 MIN: CPT | Performed by: OTOLARYNGOLOGY

## 2020-01-22 NOTE — PROGRESS NOTES
Angeline Bray LPN   Patient Intake Note    Review of Systems  Review of Systems   Constitutional: Negative for chills, fatigue and fever.   HENT:        See HPI   Respiratory: Negative for cough, choking and shortness of breath.    Gastrointestinal: Negative for diarrhea, nausea and vomiting.   Neurological: Negative for dizziness, light-headedness and headaches.   Psychiatric/Behavioral: Negative for sleep disturbance.       Tobacco Use: Screening and Cessation Intervention  Social History    Tobacco Use      Smoking status: Former Smoker        Packs/day: 1.00        Years: 6.00        Pack years: 6        Start date: 1966        Quit date: 1970        Years since quittin.0      Smokeless tobacco: Never Used      Tobacco comment: Smoked cigarettes when young.  Quit in college.  Never since.        Angeline Bray LPN  2020  10:05 AM    Answers for HPI/ROS submitted by the patient on 1/15/2020   How long have you been having these symptoms?: Other

## 2020-01-22 NOTE — PATIENT INSTRUCTIONS
"VESTIBULAR EXERCISES:    Goals:  >To develop proprioceptive and visual mechanisms to compensate for a disturbance in balance function (labyrinthine system)  >To improve muscle coordination in general  T>o practice balancing under everyday conditions with special attention to using the eyes,  muscle and joint senses  >To train the movement of the eyes independent of the head  >To loosen the muscles of the neck and shoulder to overcome the protective muscular spasm and tendency to move \"in one piece\"  >To practice head movements that cause dizziness and thus gradually overcome the disability  >To encourage the restoration of self-confidence and easy spontaneous motion     Exercise Program:  A. Eye exercises (while seated in bed). Perform 5 to 10 minutes at least 5 times each day; first do slowly, then quickly. Perform 20 times each.  1.     Up and down  2.     Side to side  3.     Diagonal  4.     Focus on finger moving from 3 feet to one foot from face    B. Head exercises. To be done at first slowly with eyes open in primary position, then quickly. Later do with eyes closed. Perform 20 times each.  1.     Bending forward and backward  2.     Turning from side to side  3.     Tilting from side to side  4.     Diagonal movements    C. Coordinate the movement of head and eyes in the same direction as in B.    D. Shoulder shrugging and circling. Perform 20 times each.    E. While seated, bend forward and  objects from the ground. Perform 20 times.    F.  Standing exercises. Perform 20 times each.  1. Repeat section A  2. Change from a sitting to a standing position with the eyes open and shut.  3. Throw ball from hand to hand, above eye level             4. Throw ball from hand to hand, under knee  5. Change form from sitting to standing, turning around in between     Full activity: Perform 10 times each.  1. Walk across room with eyes open, then closed  2. Walk up and down a slope with eyes open, then closed  3. " Walk up and down steps with eyes open, then closed  4. Sit up and lie down in bed  5. Stand up and sit down in a chair  6. Recover balance when pushed in any direction  7. Throw and catch a ball  8. Any game involving stooping, straining and aiming     The following are of value in rehabilitating patients with balance problems:  1. A light cane may be used, not for walking, but to provide additional proprioceptive            orienting input.  2. While walking, the patient should not look down, but rather select a distant point for          fixation.  3. Night-lights should be left on in the patient’s home.  4. In-patients with cervical spine problems, especially in those that head turning        increase unsteadiness, the use of soft foam cervical collar limits motion and improves         stability. Exercises should be modified accordingly.  5. Mild central stimulants (Ritalin, Caffeine) are useful in alerting the patient to respond       quickly to orienting input.  6. Optimal visual correction should be achieved and maintained. If cataract surgery is           being performed, then contact lens are recommended to avoid optical distortion.  7.  Extreme fatigue and stress is to be avoided, as this may worsen the dizziness.  8.  Sedatives, vestibular suppressants and tranquilizers (Valium, Phenergan, Antivert, Dramamine, Klonopin, etc.) are to be avoided, as this may slow compensation by the brain and cause drowsiness.    EAR CARE: :using oil  Use a hair dryer on low heat and blow into the ear canals 2 times daily to keep ears dry.  DO Not use Q tips or Pamela pins, ever!!  Do not use alcohol in the ear canal (this will cause dryness and itching)  NO peroxide or alcohol in ears  Oil: Use Sweet oil, Olive oil, or Mineral oil, purchased over the counter, 2 to 3 times a week, Do not use Q tips, You may use a hair dryer on low heat. Blow in ears for 10-15 seconds 2 times daily to dry ear canals or if ear canals are  "itching.    Use plain white vinegar, Alternate Vinegar with oil, 2 to 3 times a week     VESTIBULAR EXERCISES:    Goals:  >To develop proprioceptive and visual mechanisms to compensate for a disturbance in balance function (labyrinthine system)  >To improve muscle coordination in general  T>o practice balancing under everyday conditions with special attention to using the eyes,  muscle and joint senses  >To train the movement of the eyes independent of the head  >To loosen the muscles of the neck and shoulder to overcome the protective muscular spasm and tendency to move \"in one piece\"  >To practice head movements that cause dizziness and thus gradually overcome the disability  >To encourage the restoration of self-confidence and easy spontaneous motion     Exercise Program:  A. Eye exercises (while seated in bed). Perform 5 to 10 minutes at least 5 times each day; first do slowly, then quickly. Perform 20 times each.  1.     Up and down  2.     Side to side  3.     Diagonal  4.     Focus on finger moving from 3 feet to one foot from face    B. Head exercises. To be done at first slowly with eyes open in primary position, then quickly. Later do with eyes closed. Perform 20 times each.  1.     Bending forward and backward  2.     Turning from side to side  3.     Tilting from side to side  4.     Diagonal movements    C. Coordinate the movement of head and eyes in the same direction as in B.    D. Shoulder shrugging and circling. Perform 20 times each.    E. While seated, bend forward and  objects from the ground. Perform 20 times.    F.  Standing exercises. Perform 20 times each.  1. Repeat section A  2. Change from a sitting to a standing position with the eyes open and shut.  3. Throw ball from hand to hand, above eye level             4. Throw ball from hand to hand, under knee  5. Change form from sitting to standing, turning around in between     Full activity: Perform 10 times each.  1. Walk across room " with eyes open, then closed  2. Walk up and down a slope with eyes open, then closed  3. Walk up and down steps with eyes open, then closed  4. Sit up and lie down in bed  5. Stand up and sit down in a chair  6. Recover balance when pushed in any direction  7. Throw and catch a ball  8. Any game involving stooping, straining and aiming     The following are of value in rehabilitating patients with balance problems:  1. A light cane may be used, not for walking, but to provide additional proprioceptive            orienting input.  2. While walking, the patient should not look down, but rather select a distant point for          fixation.  3. Night-lights should be left on in the patient’s home.  4. In-patients with cervical spine problems, especially in those that head turning        increase unsteadiness, the use of soft foam cervical collar limits motion and improves         stability. Exercises should be modified accordingly.  5. Mild central stimulants (Ritalin, Caffeine) are useful in alerting the patient to respond       quickly to orienting input.  6. Optimal visual correction should be achieved and maintained. If cataract surgery is           being performed, then contact lens are recommended to avoid optical distortion.  7.  Extreme fatigue and stress is to be avoided, as this may worsen the dizziness.  8.  Sedatives, vestibular suppressants and tranquilizers (Valium, Phenergan, Antivert, Dramamine, Klonopin, etc.) are to be avoided, as this may slow compensation by the brain and cause drowsiness.     [email or web address]
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
## 2020-02-24 ENCOUNTER — OFFICE VISIT (OUTPATIENT)
Dept: RETAIL CLINIC | Facility: CLINIC | Age: 70
End: 2020-02-24

## 2020-02-24 VITALS
SYSTOLIC BLOOD PRESSURE: 128 MMHG | RESPIRATION RATE: 16 BRPM | TEMPERATURE: 97.1 F | HEART RATE: 75 BPM | DIASTOLIC BLOOD PRESSURE: 70 MMHG | OXYGEN SATURATION: 98 %

## 2020-02-24 DIAGNOSIS — J40 BRONCHITIS: Primary | ICD-10-CM

## 2020-02-24 PROCEDURE — 99213 OFFICE O/P EST LOW 20 MIN: CPT | Performed by: NURSE PRACTITIONER

## 2020-02-24 RX ORDER — METHYLPREDNISOLONE 4 MG/1
TABLET ORAL
Qty: 1 EACH | Refills: 0 | Status: SHIPPED | OUTPATIENT
Start: 2020-02-24 | End: 2020-05-26

## 2020-02-24 RX ORDER — AMOXICILLIN AND CLAVULANATE POTASSIUM 875; 125 MG/1; MG/1
1 TABLET, FILM COATED ORAL 2 TIMES DAILY
Qty: 20 TABLET | Refills: 0 | Status: SHIPPED | OUTPATIENT
Start: 2020-02-24 | End: 2020-03-05

## 2020-02-24 RX ORDER — FLUTICASONE PROPIONATE 50 MCG
2 SPRAY, SUSPENSION (ML) NASAL DAILY
Qty: 1 BOTTLE | Refills: 0 | Status: SHIPPED | OUTPATIENT
Start: 2020-02-24 | End: 2020-06-07

## 2020-02-24 RX ORDER — GUAIFENESIN 600 MG/1
1200 TABLET, EXTENDED RELEASE ORAL 2 TIMES DAILY
COMMUNITY
End: 2020-06-01

## 2020-02-24 NOTE — PATIENT INSTRUCTIONS
"Upper Respiratory Infection, Adult  An upper respiratory infection (URI) affects the nose, throat, and upper air passages. URIs are caused by germs (viruses). The most common type of URI is often called \"the common cold.\"  Medicines cannot cure URIs, but you can do things at home to relieve your symptoms. URIs usually get better within 7-10 days.  Follow these instructions at home:  Activity  · Rest as needed.  · If you have a fever, stay home from work or school until your fever is gone, or until your doctor says you may return to work or school.  ? You should stay home until you cannot spread the infection anymore (you are not contagious).  ? Your doctor may have you wear a face mask so you have less risk of spreading the infection.  Relieving symptoms  · Gargle with a salt-water mixture 3-4 times a day or as needed. To make a salt-water mixture, completely dissolve ½-1 tsp of salt in 1 cup of warm water.  · Use a cool-mist humidifier to add moisture to the air. This can help you breathe more easily.  Eating and drinking    · Drink enough fluid to keep your pee (urine) pale yellow.  · Eat soups and other clear broths.  General instructions    · Take over-the-counter and prescription medicines only as told by your doctor. These include cold medicines, fever reducers, and cough suppressants.  · Do not use any products that contain nicotine or tobacco. These include cigarettes and e-cigarettes. If you need help quitting, ask your doctor.  · Avoid being where people are smoking (avoid secondhand smoke).  · Make sure you get regular shots and get the flu shot every year.  · Keep all follow-up visits as told by your doctor. This is important.  How to avoid spreading infection to others    · Wash your hands often with soap and water. If you do not have soap and water, use hand .  · Avoid touching your mouth, face, eyes, or nose.  · Cough or sneeze into a tissue or your sleeve or elbow. Do not cough or sneeze " "into your hand or into the air.  Contact a doctor if:  · You are getting worse, not better.  · You have any of these:  ? A fever.  ? Chills.  ? Brown or red mucus in your nose.  ? Yellow or brown fluid (discharge)coming from your nose.  ? Pain in your face, especially when you bend forward.  ? Swollen neck glands.  ? Pain with swallowing.  ? White areas in the back of your throat.  Get help right away if:  · You have shortness of breath that gets worse.  · You have very bad or constant:  ? Headache.  ? Ear pain.  ? Pain in your forehead, behind your eyes, and over your cheekbones (sinus pain).  ? Chest pain.  · You have long-lasting (chronic) lung disease along with any of these:  ? Wheezing.  ? Long-lasting cough.  ? Coughing up blood.  ? A change in your usual mucus.  · You have a stiff neck.  · You have changes in your:  ? Vision.  ? Hearing.  ? Thinking.  ? Mood.  Summary  · An upper respiratory infection (URI) is caused by a germ called a virus. The most common type of URI is often called \"the common cold.\"  · URIs usually get better within 7-10 days.  · Take over-the-counter and prescription medicines only as told by your doctor.  This information is not intended to replace advice given to you by your health care provider. Make sure you discuss any questions you have with your health care provider.  Document Released: 06/05/2009 Document Revised: 12/26/2019 Document Reviewed: 08/10/2018  [employer] Interactive Patient Education © 2020 [employer]  Acute Bronchitis, Adult  Acute bronchitis is when air tubes (bronchi) in the lungs suddenly get swollen. The condition can make it hard to breathe. It can also cause these symptoms:  · A cough.  · Coughing up clear, yellow, or green mucus.  · Wheezing.  · Chest congestion.  · Shortness of breath.  · A fever.  · Body aches.  · Chills.  · A sore throat.  Follow these instructions at home:    Medicines  · Take over-the-counter and prescription medicines only as told by " your doctor.  · If you were prescribed an antibiotic medicine, take it as told by your doctor. Do not stop taking the antibiotic even if you start to feel better.  General instructions  · Rest.  · Drink enough fluids to keep your pee (urine) pale yellow.  · Avoid smoking and secondhand smoke. If you smoke and you need help quitting, ask your doctor. Quitting will help your lungs heal faster.  · Use an inhaler, cool mist vaporizer, or humidifier as told by your doctor.  · Keep all follow-up visits as told by your doctor. This is important.  How is this prevented?  To lower your risk of getting this condition again:  · Wash your hands often with soap and water. If you cannot use soap and water, use hand .  · Avoid contact with people who have cold symptoms.  · Try not to touch your hands to your mouth, nose, or eyes.  · Make sure to get the flu shot every year.  Contact a doctor if:  · Your symptoms do not get better in 2 weeks.  Get help right away if:  · You cough up blood.  · You have chest pain.  · You have very bad shortness of breath.  · You become dehydrated.  · You faint (pass out) or keep feeling like you are going to pass out.  · You keep throwing up (vomiting).  · You have a very bad headache.  · Your fever or chills gets worse.  This information is not intended to replace advice given to you by your health care provider. Make sure you discuss any questions you have with your health care provider.  Document Released: 06/05/2009 Document Revised: 08/01/2018 Document Reviewed: 06/07/2017  [employer] Interactive Patient Education © 2020 [employer]

## 2020-02-24 NOTE — PROGRESS NOTES
Subjective   Rasheed Arevalo is a 69 y.o. male.     The symptoms have been going on for about 1 week. He has had a sore throat, he thinks he has a fever but has not checked, he sleeps with a CPAP and has had trouble laying flat lately because of nasal congestion.    URI    This is a new problem. The current episode started in the past 7 days. The problem has been gradually worsening. Maximum temperature: unsure. Associated symptoms include congestion, coughing, sinus pain ( behind his eyes) and a sore throat. Pertinent negatives include no abdominal pain, chest pain, ear pain, nausea or vomiting. Treatments tried: mucinex.        The following portions of the patient's history were reviewed and updated as appropriate: allergies, current medications, past family history, past medical history, past social history, past surgical history and problem list.    Review of Systems   Constitutional: Positive for fatigue.   HENT: Positive for congestion, postnasal drip, sinus pressure and sore throat. Negative for ear pain.    Respiratory: Positive for cough.    Cardiovascular: Negative for chest pain.   Gastrointestinal: Negative for abdominal pain, nausea and vomiting.   Psychiatric/Behavioral: Positive for sleep disturbance.       Objective   Physical Exam   Constitutional: He appears well-developed and well-nourished.   HENT:   Head: Normocephalic and atraumatic.   Right Ear: Hearing, external ear and ear canal normal. Tympanic membrane is scarred. A middle ear effusion is present.   Left Ear: Hearing, external ear and ear canal normal. Tympanic membrane is scarred. A middle ear effusion is present.   Nose: Nose normal.   Mouth/Throat: Posterior oropharyngeal erythema present.   Sadaf fluid behind left TM   Cardiovascular: Normal rate and regular rhythm.   Pulmonary/Chest: Effort normal. He has rhonchi.   Clears with cough   Lymphadenopathy:     He has cervical adenopathy.         Assessment/Plan   Rasheed was seen today for  uri.    Diagnoses and all orders for this visit:    Bronchitis    Other orders  -     methylPREDNISolone (MEDROL, CLINTON,) 4 MG tablet; Take as directed on package instructions.  -     amoxicillin-clavulanate (AUGMENTIN) 875-125 MG per tablet; Take 1 tablet by mouth 2 (Two) Times a Day for 10 days.  -     fluticasone (FLONASE) 50 MCG/ACT nasal spray; 2 sprays into the nostril(s) as directed by provider Daily.

## 2020-04-21 ENCOUNTER — HOSPITAL ENCOUNTER (OUTPATIENT)
Dept: CT IMAGING | Facility: HOSPITAL | Age: 70
Discharge: HOME | End: 2020-04-21
Admitting: UROLOGY

## 2020-04-21 DIAGNOSIS — N40.1 BPH WITH URINARY OBSTRUCTION: ICD-10-CM

## 2020-04-21 DIAGNOSIS — N13.8 BPH WITH URINARY OBSTRUCTION: ICD-10-CM

## 2020-04-21 DIAGNOSIS — N28.1 RENAL CYST: ICD-10-CM

## 2020-04-21 DIAGNOSIS — Z86.73 HISTORY OF TIA (TRANSIENT ISCHEMIC ATTACK): ICD-10-CM

## 2020-04-21 DIAGNOSIS — E78.2 MIXED HYPERLIPIDEMIA: ICD-10-CM

## 2020-04-21 LAB
CREAT BLDA-MCNC: 1.2 MG/DL (ref 0.6–1.3)
PSA SERPL-MCNC: 3.06 NG/ML (ref 0–4)

## 2020-04-21 PROCEDURE — 36415 COLL VENOUS BLD VENIPUNCTURE: CPT

## 2020-04-21 PROCEDURE — 84153 ASSAY OF PSA TOTAL: CPT | Performed by: UROLOGY

## 2020-04-21 PROCEDURE — 82565 ASSAY OF CREATININE: CPT

## 2020-04-21 PROCEDURE — 25010000002 IOPAMIDOL 61 % SOLUTION: Performed by: UROLOGY

## 2020-04-21 PROCEDURE — 74178 CT ABD&PLV WO CNTR FLWD CNTR: CPT

## 2020-04-21 RX ORDER — ATORVASTATIN CALCIUM 10 MG/1
10 TABLET, FILM COATED ORAL DAILY
Qty: 90 TABLET | Refills: 3 | OUTPATIENT
Start: 2020-04-21

## 2020-04-21 RX ADMIN — IOPAMIDOL 100 ML: 612 INJECTION, SOLUTION INTRAVENOUS at 09:01
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
## 2020-05-06 ENCOUNTER — OFFICE VISIT (OUTPATIENT)
Dept: UROLOGY | Facility: CLINIC | Age: 70
End: 2020-05-06

## 2020-05-06 VITALS — WEIGHT: 213 LBS | BODY MASS INDEX: 28.85 KG/M2 | HEIGHT: 72 IN | TEMPERATURE: 97.7 F

## 2020-05-06 DIAGNOSIS — N40.1 BPH WITH URINARY OBSTRUCTION: Primary | ICD-10-CM

## 2020-05-06 DIAGNOSIS — N20.0 NEPHROLITHIASIS: ICD-10-CM

## 2020-05-06 DIAGNOSIS — N13.8 BPH WITH URINARY OBSTRUCTION: Primary | ICD-10-CM

## 2020-05-06 DIAGNOSIS — N28.1 RENAL CYST: ICD-10-CM

## 2020-05-06 LAB
BILIRUB BLD-MCNC: NEGATIVE MG/DL
CLARITY, POC: CLEAR
COLOR UR: YELLOW
GLUCOSE UR STRIP-MCNC: NEGATIVE MG/DL
KETONES UR QL: NEGATIVE
LEUKOCYTE EST, POC: NEGATIVE
NITRITE UR-MCNC: NEGATIVE MG/ML
PH UR: 7 [PH] (ref 5–8)
PROT UR STRIP-MCNC: NEGATIVE MG/DL
RBC # UR STRIP: NEGATIVE /UL
SP GR UR: 1.02 (ref 1–1.03)
UROBILINOGEN UR QL: NORMAL

## 2020-05-06 PROCEDURE — 81001 URINALYSIS AUTO W/SCOPE: CPT | Performed by: UROLOGY

## 2020-05-06 PROCEDURE — 99214 OFFICE O/P EST MOD 30 MIN: CPT | Performed by: UROLOGY

## 2020-05-23 ENCOUNTER — HOSPITAL ENCOUNTER (EMERGENCY)
Facility: HOSPITAL | Age: 70
Discharge: HOME | End: 2020-05-23
Admitting: EMERGENCY MEDICINE

## 2020-05-23 ENCOUNTER — APPOINTMENT (OUTPATIENT)
Dept: GENERAL RADIOLOGY | Facility: HOSPITAL | Age: 70
End: 2020-05-23

## 2020-05-23 VITALS
HEART RATE: 80 BPM | BODY MASS INDEX: 29.12 KG/M2 | RESPIRATION RATE: 16 BRPM | SYSTOLIC BLOOD PRESSURE: 132 MMHG | WEIGHT: 215 LBS | OXYGEN SATURATION: 100 % | HEIGHT: 72 IN | DIASTOLIC BLOOD PRESSURE: 76 MMHG | TEMPERATURE: 98.2 F

## 2020-05-23 DIAGNOSIS — S61.215A LACERATION OF LEFT RING FINGER WITHOUT FOREIGN BODY WITHOUT DAMAGE TO NAIL, INITIAL ENCOUNTER: Primary | ICD-10-CM

## 2020-05-23 DIAGNOSIS — S61.313A LACERATION OF LEFT MIDDLE FINGER WITHOUT FOREIGN BODY WITH DAMAGE TO NAIL, INITIAL ENCOUNTER: ICD-10-CM

## 2020-05-23 PROCEDURE — 99283 EMERGENCY DEPT VISIT LOW MDM: CPT

## 2020-05-23 PROCEDURE — 25010000003 LIDOCAINE 1 % SOLUTION: Performed by: PHYSICIAN ASSISTANT

## 2020-05-23 PROCEDURE — 73130 X-RAY EXAM OF HAND: CPT

## 2020-05-23 RX ORDER — CEPHALEXIN 500 MG/1
500 CAPSULE ORAL 2 TIMES DAILY
Qty: 14 CAPSULE | Refills: 0 | Status: SHIPPED | OUTPATIENT
Start: 2020-05-23 | End: 2020-05-30

## 2020-05-23 RX ORDER — KETOROLAC TROMETHAMINE 10 MG/1
10 TABLET, FILM COATED ORAL EVERY 6 HOURS PRN
Status: DISCONTINUED | OUTPATIENT
Start: 2020-05-23 | End: 2020-05-23 | Stop reason: HOSPADM

## 2020-05-23 RX ORDER — LIDOCAINE HYDROCHLORIDE 10 MG/ML
10 INJECTION, SOLUTION INFILTRATION; PERINEURAL ONCE
Status: COMPLETED | OUTPATIENT
Start: 2020-05-23 | End: 2020-05-23

## 2020-05-23 RX ADMIN — LIDOCAINE HYDROCHLORIDE 10 ML: 10 INJECTION, SOLUTION INFILTRATION; PERINEURAL at 18:42

## 2020-05-23 RX ADMIN — KETOROLAC TROMETHAMINE 10 MG: 10 TABLET, FILM COATED ORAL at 18:45

## 2020-08-03 ENCOUNTER — LAB (OUTPATIENT)
Dept: LAB | Facility: HOSPITAL | Age: 70
End: 2020-08-03

## 2020-08-03 DIAGNOSIS — Z86.73 HISTORY OF TIA (TRANSIENT ISCHEMIC ATTACK): ICD-10-CM

## 2020-08-03 DIAGNOSIS — E78.2 MIXED HYPERLIPIDEMIA: ICD-10-CM

## 2020-08-03 LAB
ALBUMIN SERPL-MCNC: 4.4 G/DL (ref 3.5–5.2)
ALBUMIN/GLOB SERPL: 1.6 G/DL
ALP SERPL-CCNC: 50 U/L (ref 39–117)
ALT SERPL W P-5'-P-CCNC: 16 U/L (ref 1–41)
ANION GAP SERPL CALCULATED.3IONS-SCNC: 8.3 MMOL/L (ref 5–15)
AST SERPL-CCNC: 20 U/L (ref 1–40)
BILIRUB SERPL-MCNC: 0.6 MG/DL (ref 0–1.2)
BUN SERPL-MCNC: 17 MG/DL (ref 8–23)
BUN/CREAT SERPL: 14 (ref 7–25)
CALCIUM SPEC-SCNC: 9.8 MG/DL (ref 8.6–10.5)
CHLORIDE SERPL-SCNC: 104 MMOL/L (ref 98–107)
CHOLEST SERPL-MCNC: 149 MG/DL (ref 0–200)
CO2 SERPL-SCNC: 27.7 MMOL/L (ref 22–29)
CREAT SERPL-MCNC: 1.21 MG/DL (ref 0.76–1.27)
DEPRECATED RDW RBC AUTO: 42.8 FL (ref 37–54)
ERYTHROCYTE [DISTWIDTH] IN BLOOD BY AUTOMATED COUNT: 13.1 % (ref 12.3–15.4)
GFR SERPL CREATININE-BSD FRML MDRD: 59 ML/MIN/1.73
GLOBULIN UR ELPH-MCNC: 2.8 GM/DL
GLUCOSE SERPL-MCNC: 93 MG/DL (ref 65–99)
HCT VFR BLD AUTO: 41.2 % (ref 37.5–51)
HDLC SERPL-MCNC: 44 MG/DL (ref 40–60)
HGB BLD-MCNC: 14.1 G/DL (ref 13–17.7)
LDLC SERPL CALC-MCNC: 76 MG/DL (ref 0–100)
LDLC/HDLC SERPL: 1.74 {RATIO}
MCH RBC QN AUTO: 31 PG (ref 26.6–33)
MCHC RBC AUTO-ENTMCNC: 34.2 G/DL (ref 31.5–35.7)
MCV RBC AUTO: 90.5 FL (ref 79–97)
PLATELET # BLD AUTO: 278 10*3/MM3 (ref 140–450)
PMV BLD AUTO: 10.7 FL (ref 6–12)
POTASSIUM SERPL-SCNC: 4.4 MMOL/L (ref 3.5–5.2)
PROT SERPL-MCNC: 7.2 G/DL (ref 6–8.5)
RBC # BLD AUTO: 4.55 10*6/MM3 (ref 4.14–5.8)
SODIUM SERPL-SCNC: 140 MMOL/L (ref 136–145)
TRIGL SERPL-MCNC: 143 MG/DL (ref 0–150)
VLDLC SERPL-MCNC: 28.6 MG/DL (ref 5–40)
WBC # BLD AUTO: 8.44 10*3/MM3 (ref 3.4–10.8)

## 2020-08-03 PROCEDURE — 85027 COMPLETE CBC AUTOMATED: CPT | Performed by: INTERNAL MEDICINE

## 2020-08-03 PROCEDURE — 80061 LIPID PANEL: CPT | Performed by: INTERNAL MEDICINE

## 2020-08-03 PROCEDURE — 36415 COLL VENOUS BLD VENIPUNCTURE: CPT

## 2020-08-03 PROCEDURE — 80053 COMPREHEN METABOLIC PANEL: CPT | Performed by: INTERNAL MEDICINE

## 2020-08-10 ENCOUNTER — OFFICE VISIT (OUTPATIENT)
Dept: INTERNAL MEDICINE | Facility: CLINIC | Age: 70
End: 2020-08-10

## 2020-08-10 VITALS
WEIGHT: 218 LBS | SYSTOLIC BLOOD PRESSURE: 132 MMHG | HEART RATE: 71 BPM | RESPIRATION RATE: 16 BRPM | OXYGEN SATURATION: 96 % | BODY MASS INDEX: 29.53 KG/M2 | HEIGHT: 72 IN | DIASTOLIC BLOOD PRESSURE: 78 MMHG

## 2020-08-10 DIAGNOSIS — E78.2 MIXED HYPERLIPIDEMIA: ICD-10-CM

## 2020-08-10 DIAGNOSIS — E66.3 OVERWEIGHT WITH BODY MASS INDEX (BMI) OF 29 TO 29.9 IN ADULT: ICD-10-CM

## 2020-08-10 DIAGNOSIS — I47.1 SUPRAVENTRICULAR TACHYCARDIA (HCC): ICD-10-CM

## 2020-08-10 DIAGNOSIS — Z00.00 MEDICARE ANNUAL WELLNESS VISIT, SUBSEQUENT: Primary | ICD-10-CM

## 2020-08-10 DIAGNOSIS — L81.9 DISCOLORATION OF SKIN OF FINGER: ICD-10-CM

## 2020-08-10 PROCEDURE — 96160 PT-FOCUSED HLTH RISK ASSMT: CPT | Performed by: NURSE PRACTITIONER

## 2020-08-10 PROCEDURE — 99214 OFFICE O/P EST MOD 30 MIN: CPT | Performed by: NURSE PRACTITIONER

## 2020-08-10 PROCEDURE — G0439 PPPS, SUBSEQ VISIT: HCPCS | Performed by: NURSE PRACTITIONER

## 2020-08-10 NOTE — PROGRESS NOTES
The ABCs of the Annual Wellness Visit  Subsequent Medicare Wellness Visit    Chief Complaint   Patient presents with   • Medicare Wellness-subsequent   • Finger Injury     still having issue with finger, had injury in May was seen in [facility] ED       Subjective   History of Present Illness:  Rasheed Arevalo is a 70 y.o. male who presents for a Subsequent Medicare Wellness Visit.  See associated note  HEALTH RISK ASSESSMENT    Recent Hospitalizations:  No hospitalization(s) within the last year.    Current Medical Providers:  Patient Care Team:  Wild Alatorre DO as PCP - General (Internal Medicine)  Shaye Acevedo APRN (Inactive) as Nurse Practitioner (Otolaryngology)  Gavin Kitchen MD as Consulting Physician (Otolaryngology)    Smoking Status:  Social History     Tobacco Use   Smoking Status Former Smoker   • Packs/day: 1.00   • Years: 6.00   • Pack years: 6.00   • Start date: 1966   • Last attempt to quit: 1970   • Years since quittin.6   Smokeless Tobacco Never Used   Tobacco Comment    Smoked cigarettes when young.  Quit in college.  Never since.       Alcohol Consumption:  Social History     Substance and Sexual Activity   Alcohol Use No       Depression Screen:   PHQ-2/PHQ-9 Depression Screening 8/10/2020   Little interest or pleasure in doing things 0   Feeling down, depressed, or hopeless 0   Total Score 0       Fall Risk Screen:  STEADI Fall Risk Assessment has not been completed.    Health Habits and Functional and Cognitive Screening:  Functional & Cognitive Status 8/10/2020   Do you have difficulty preparing food and eating? No   Do you have difficulty bathing yourself, getting dressed or grooming yourself? No   Do you have difficulty using the toilet? No   Do you have difficulty moving around from place to place? No   Do you have trouble with steps or getting out of a bed or a chair? No   Current Diet Unhealthy Diet   Dental Exam Up to date   Eye Exam Not up to date   Exercise  (times per week) 0 times per week   Current Exercise Activities Include None   Do you need help using the phone?  No   Are you deaf or do you have serious difficulty hearing?  Yes   Do you need help with transportation? No   Do you need help shopping? No   Do you need help preparing meals?  No   Do you need help with housework?  No   Do you need help with laundry? No   Do you need help taking your medications? No   Do you need help managing money? No   Do you ever drive or ride in a car without wearing a seat belt? No   Have you felt unusual stress, anger or loneliness in the last month? No   Who do you live with? Spouse   If you need help, do you have trouble finding someone available to you? No   Have you been bothered in the last four weeks by sexual problems? No   Do you have difficulty concentrating, remembering or making decisions? No         Does the patient have evidence of cognitive impairment? No    Asprin use counseling:Taking ASA appropriately as indicated    Age-appropriate Screening Schedule:  Refer to the list below for future screening recommendations based on patient's age, sex and/or medical conditions. Orders for these recommended tests are listed in the plan section. The patient has been provided with a written plan.    Health Maintenance   Topic Date Due   • INFLUENZA VACCINE  08/01/2020   • ZOSTER VACCINE (3 of 3) 09/17/2020   • COLONOSCOPY  12/29/2020   • LIPID PANEL  08/03/2021   • TDAP/TD VACCINES (2 - Td) 08/16/2027          The following portions of the patient's history were reviewed and updated as appropriate: allergies, current medications, past family history, past medical history, past social history, past surgical history and problem list.    Outpatient Medications Prior to Visit   Medication Sig Dispense Refill   • aspirin 81 MG chewable tablet Chew 1 tablet Daily. (Patient taking differently: Chew 81 mg As Needed.) 90 tablet 3   • atorvastatin (LIPITOR) 10 MG tablet Take 1 tablet  "by mouth Daily. 90 tablet 3     No facility-administered medications prior to visit.        Patient Active Problem List   Diagnosis   • Ataxia   • Mixed hyperlipidemia   • History of TIA (transient ischemic attack)   • Renal cysts, acquired, bilateral   • Benign non-nodular prostatic hyperplasia without lower urinary tract symptoms   • Chronic non-seasonal allergic rhinitis   • History of kidney stones   • Overweight with body mass index (BMI) of 29 to 29.9 in adult   • Supraventricular tachycardia (CMS/HCC)   • NAVNEET on CPAP       Advanced Care Planning:  ACP discussion was held with the patient during this visit. Patient has an advance directive in EMR which is still valid.     Review of Systems  See associated note  Compared to one year ago, the patient feels his physical health is the same.  Compared to one year ago, the patient feels his mental health is the same.    Reviewed chart for potential of high risk medication in the elderly: yes  Reviewed chart for potential of harmful drug interactions in the elderly:yes    Objective         Vitals:    08/10/20 1111   BP: 132/78   BP Location: Left arm   Patient Position: Sitting   Cuff Size: Adult   Pulse: 71   Resp: 16   SpO2: 96%   Weight: 98.9 kg (218 lb)   Height: 182.9 cm (72\")       Body mass index is 29.57 kg/m².  Discussed the patient's BMI with him. The BMI is above average; BMI management plan is completed.    Physical Exam  See associated note  Lab Results   Component Value Date    TRIG 143 08/03/2020    HDL 44 08/03/2020    LDL 76 08/03/2020    VLDL 28.6 08/03/2020        Assessment/Plan   Medicare Risks and Personalized Health Plan  CMS Preventative Services Quick Reference  Cardiovascular risk  Chronic Pain   Obesity/Overweight     The above risks/problems have been discussed with the patient.  Pertinent information has been shared with the patient in the After Visit Summary.  Follow up plans and orders are seen below in the Assessment/Plan " Section.    Diagnoses and all orders for this visit:    1. Medicare annual wellness visit, subsequent (Primary)    2. Mixed hyperlipidemia    3. Supraventricular tachycardia (CMS/HCC)    4. Overweight with body mass index (BMI) of 29 to 29.9 in adult    5. Discoloration of skin of finger  Comments:  left middle digit      Follow Up:  Return in about 1 year (around 8/10/2021).     An After Visit Summary and PPPS were given to the patient.

## 2020-08-10 NOTE — PROGRESS NOTES
"CC:  Medicare wellness, finger problem    History:  Rasheed Arevalo is a 70 y.o. male who presents today for follow-up for evaluation of the above:  Patient presents today for medicare wellness.   He reports he has been feeling well without acute illness. He does mention that the skin on his left middle finger where he had a laceration repair in May is concerning him. The skin on the end of the finger is tender and red with skin peeling. Has been applying lotion but not other OTC treatment.   BMI today is 29.6      ROS:  Review of Systems   Constitutional: Negative for activity change, appetite change, fatigue, fever and unexpected weight change.   HENT: Negative.    Respiratory: Negative for cough, chest tightness, shortness of breath and wheezing.    Cardiovascular: Negative for chest pain, palpitations and leg swelling.   Gastrointestinal: Negative.    Endocrine: Negative.    Genitourinary: Negative.    Musculoskeletal: Negative.    Skin: Negative.    Neurological: Negative for dizziness and headaches.   Psychiatric/Behavioral: Negative.        Mr. Arevalo  reports that he quit smoking about 50 years ago. He started smoking about 54 years ago. He has a 6.00 pack-year smoking history. He has never used smokeless tobacco. He reports that he does not drink alcohol or use drugs.      Current Outpatient Medications:   •  aspirin 81 MG chewable tablet, Chew 1 tablet Daily. (Patient taking differently: Chew 81 mg As Needed.), Disp: 90 tablet, Rfl: 3  •  atorvastatin (LIPITOR) 10 MG tablet, Take 1 tablet by mouth Daily., Disp: 90 tablet, Rfl: 3      OBJECTIVE:  /78 (BP Location: Left arm, Patient Position: Sitting, Cuff Size: Adult)   Pulse 71   Resp 16   Ht 182.9 cm (72\")   Wt 98.9 kg (218 lb)   SpO2 96%   BMI 29.57 kg/m²    Physical Exam   Constitutional: He is oriented to person, place, and time. He appears well-developed and well-nourished.   HENT:   Head: Normocephalic and atraumatic.   Eyes: Pupils are " equal, round, and reactive to light. Conjunctivae and EOM are normal.   Neck: Normal range of motion. Neck supple.   Cardiovascular: Normal rate, regular rhythm and normal heart sounds.   Pulmonary/Chest: Effort normal and breath sounds normal.   Abdominal: Soft. Bowel sounds are normal.   Neurological: He is alert and oriented to person, place, and time. He has normal reflexes.   Skin: Skin is warm and dry. Rash noted. There is erythema.   Psychiatric: He has a normal mood and affect.   Vitals reviewed.      Assessment/Plan    Rasheed was seen today for medicare wellness-subsequent and finger injury.    Diagnoses and all orders for this visit:    Medicare annual wellness visit, subsequent  See associated note    Mixed hyperlipidemia  Continue current statin. Tolerated well    Supraventricular tachycardia (CMS/HCC)  On ASA stable at this time.     Overweight with body mass index (BMI) of 29 to 29.9 in adult  Recommended attention to portion control and being careful about the types and timing of meals for the purpose of weight management.    Discoloration of skin of finger  Comments:  left middle digit  Advised to use GUSTAVO on finger BID for two weeks. F/u if not improving.        An After Visit Summary was printed and given to the patient at discharge.  Return in about 1 year (around 8/10/2021). Sooner if problems arise.          [place] RAI. 8/10/2020   Electronically Signed

## 2020-10-27 DIAGNOSIS — E78.2 MIXED HYPERLIPIDEMIA: ICD-10-CM

## 2020-10-27 DIAGNOSIS — Z86.73 HISTORY OF TIA (TRANSIENT ISCHEMIC ATTACK): ICD-10-CM

## 2020-10-27 RX ORDER — ATORVASTATIN CALCIUM 10 MG/1
TABLET, FILM COATED ORAL
Qty: 90 TABLET | Refills: 0 | Status: SHIPPED | OUTPATIENT
Start: 2020-10-27 | End: 2021-07-12 | Stop reason: SDUPTHER

## 2020-11-24 ENCOUNTER — OFFICE VISIT (OUTPATIENT)
Dept: OTOLARYNGOLOGY | Age: 70
End: 2020-11-24
Payer: MEDICARE

## 2020-11-24 VITALS
BODY MASS INDEX: 30.34 KG/M2 | WEIGHT: 224 LBS | HEIGHT: 72 IN | SYSTOLIC BLOOD PRESSURE: 120 MMHG | DIASTOLIC BLOOD PRESSURE: 70 MMHG

## 2020-11-24 PROBLEM — H73.12 CHRONIC MYRINGITIS OF LEFT EAR: Status: ACTIVE | Noted: 2020-11-24

## 2020-11-24 PROBLEM — H61.22 IMPACTED CERUMEN OF LEFT EAR: Status: ACTIVE | Noted: 2020-11-24

## 2020-11-24 PROCEDURE — 4040F PNEUMOC VAC/ADMIN/RCVD: CPT | Performed by: OTOLARYNGOLOGY

## 2020-11-24 PROCEDURE — 3017F COLORECTAL CA SCREEN DOC REV: CPT | Performed by: OTOLARYNGOLOGY

## 2020-11-24 PROCEDURE — 69210 REMOVE IMPACTED EAR WAX UNI: CPT | Performed by: OTOLARYNGOLOGY

## 2020-11-24 PROCEDURE — 1036F TOBACCO NON-USER: CPT | Performed by: OTOLARYNGOLOGY

## 2020-11-24 PROCEDURE — 99212 OFFICE O/P EST SF 10 MIN: CPT | Performed by: OTOLARYNGOLOGY

## 2020-11-24 PROCEDURE — G8417 CALC BMI ABV UP PARAM F/U: HCPCS | Performed by: OTOLARYNGOLOGY

## 2020-11-24 PROCEDURE — G8484 FLU IMMUNIZE NO ADMIN: HCPCS | Performed by: OTOLARYNGOLOGY

## 2020-11-24 PROCEDURE — G8427 DOCREV CUR MEDS BY ELIG CLIN: HCPCS | Performed by: OTOLARYNGOLOGY

## 2020-11-24 PROCEDURE — 1123F ACP DISCUSS/DSCN MKR DOCD: CPT | Performed by: OTOLARYNGOLOGY

## 2020-11-24 RX ORDER — NEOMYCIN SULFATE, POLYMYXIN B SULFATE AND HYDROCORTISONE 10; 3.5; 1 MG/ML; MG/ML; [USP'U]/ML
4 SUSPENSION/ DROPS AURICULAR (OTIC) 3 TIMES DAILY
Qty: 1 BOTTLE | Refills: 3 | Status: SHIPPED | OUTPATIENT
Start: 2020-11-24 | End: 2020-12-08

## 2020-11-24 RX ORDER — CIPROFLOXACIN AND DEXAMETHASONE 3; 1 MG/ML; MG/ML
4 SUSPENSION/ DROPS AURICULAR (OTIC) 2 TIMES DAILY
Qty: 1 BOTTLE | Refills: 3 | Status: SHIPPED | OUTPATIENT
Start: 2020-11-24 | End: 2020-12-08
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
## 2020-11-24 NOTE — ASSESSMENT & PLAN NOTE
Granulation tissue at posterior TM and posterior annular ring. Must always be suspicious for cholesteatoma with such presentation especially in light of hearing loss.   We will treat topically and reevaluate

## 2020-12-03 ENCOUNTER — OFFICE VISIT (OUTPATIENT)
Dept: NEUROLOGY | Facility: CLINIC | Age: 70
End: 2020-12-03

## 2020-12-03 VITALS
OXYGEN SATURATION: 98 % | BODY MASS INDEX: 28.31 KG/M2 | WEIGHT: 209 LBS | SYSTOLIC BLOOD PRESSURE: 120 MMHG | HEIGHT: 72 IN | HEART RATE: 87 BPM | DIASTOLIC BLOOD PRESSURE: 74 MMHG

## 2020-12-03 DIAGNOSIS — G89.29 CHRONIC LOW BACK PAIN WITHOUT SCIATICA, UNSPECIFIED BACK PAIN LATERALITY: ICD-10-CM

## 2020-12-03 DIAGNOSIS — M54.50 CHRONIC LOW BACK PAIN WITHOUT SCIATICA, UNSPECIFIED BACK PAIN LATERALITY: ICD-10-CM

## 2020-12-03 DIAGNOSIS — G47.33 OSA ON CPAP: Primary | ICD-10-CM

## 2020-12-03 DIAGNOSIS — Z99.89 OSA ON CPAP: Primary | ICD-10-CM

## 2020-12-03 PROCEDURE — 99214 OFFICE O/P EST MOD 30 MIN: CPT | Performed by: PHYSICIAN ASSISTANT

## 2020-12-03 RX ORDER — CIPROFLOXACIN AND DEXAMETHASONE 3; 1 MG/ML; MG/ML
SUSPENSION/ DROPS AURICULAR (OTIC)
COMMUNITY
Start: 2020-11-24 | End: 2021-07-12
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
## 2020-12-15 ENCOUNTER — PROCEDURE VISIT (OUTPATIENT)
Dept: OTOLARYNGOLOGY | Age: 70
End: 2020-12-15
Payer: MEDICARE

## 2020-12-15 ENCOUNTER — OFFICE VISIT (OUTPATIENT)
Dept: OTOLARYNGOLOGY | Age: 70
End: 2020-12-15
Payer: MEDICARE

## 2020-12-15 VITALS
WEIGHT: 224 LBS | SYSTOLIC BLOOD PRESSURE: 110 MMHG | HEIGHT: 72 IN | BODY MASS INDEX: 30.34 KG/M2 | DIASTOLIC BLOOD PRESSURE: 78 MMHG

## 2020-12-15 PROBLEM — H90.3 BILATERAL SENSORINEURAL HEARING LOSS: Status: ACTIVE | Noted: 2020-12-15

## 2020-12-15 PROCEDURE — 92552 PURE TONE AUDIOMETRY AIR: CPT | Performed by: AUDIOLOGIST

## 2020-12-15 PROCEDURE — 92567 TYMPANOMETRY: CPT | Performed by: AUDIOLOGIST

## 2020-12-15 PROCEDURE — 4040F PNEUMOC VAC/ADMIN/RCVD: CPT | Performed by: OTOLARYNGOLOGY

## 2020-12-15 PROCEDURE — G8427 DOCREV CUR MEDS BY ELIG CLIN: HCPCS | Performed by: OTOLARYNGOLOGY

## 2020-12-15 PROCEDURE — G8417 CALC BMI ABV UP PARAM F/U: HCPCS | Performed by: OTOLARYNGOLOGY

## 2020-12-15 PROCEDURE — 1123F ACP DISCUSS/DSCN MKR DOCD: CPT | Performed by: OTOLARYNGOLOGY

## 2020-12-15 PROCEDURE — G8484 FLU IMMUNIZE NO ADMIN: HCPCS | Performed by: OTOLARYNGOLOGY

## 2020-12-15 PROCEDURE — 3017F COLORECTAL CA SCREEN DOC REV: CPT | Performed by: OTOLARYNGOLOGY

## 2020-12-15 PROCEDURE — 1036F TOBACCO NON-USER: CPT | Performed by: OTOLARYNGOLOGY

## 2020-12-15 PROCEDURE — 99213 OFFICE O/P EST LOW 20 MIN: CPT | Performed by: OTOLARYNGOLOGY

## 2020-12-15 NOTE — ASSESSMENT & PLAN NOTE
Bilateral sloping high-frequency hearing loss relatively unchanged from prior audiogram done about 18 months ago.

## 2020-12-15 NOTE — PROGRESS NOTES
History   Ray Noguera is a 72 y.o. male who presented to the clinic this date for a hearing evaluation following treatment for left ear infection. Summary   Tympanometry consistent with normal TM mobility bilaterally. Pure tone testing indicates moderately severe SNHL bilaterally. When compared to audiogram obtained in 2019 at an outside clinic, thresholds remained stable bilaterally. Based on degree of hearing loss, binaural hearing aids are recommended. Results   Otoscopy:   ? Right: Unremarkable  ? Left: Unremarkable    Audiometry:   ? Right: Moderately severe high frequency SNHL (previously established)  ? Left: Moderately severe high frequency SNHL (previously established)         Tympanometry:    ? Right: Type A  ? Left: Type A    Plan   Results of today's testing were discussed with Mr. Del Toro Kiarra and the following recommendations were made:    1. Follow up with ENT as scheduled. 2. Monitor hearing yearly, sooner with changes. 3. Hearing aid evaluation as desired. 4. Hearing protection as warranted.       Audiogram and Acoustic Immittance

## 2020-12-15 NOTE — ASSESSMENT & PLAN NOTE
Myringitis resolved. TM intact. Nothing suspicious for chronic middle ear disease.   Specifically on his prior visit I have been worried about cholesteatoma but now that the inflammation has resolved that is no longer a concern
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
## 2021-05-06 ENCOUNTER — HOSPITAL ENCOUNTER (OUTPATIENT)
Dept: ULTRASOUND IMAGING | Facility: HOSPITAL | Age: 71
Discharge: HOME | End: 2021-05-06
Admitting: UROLOGY

## 2021-05-06 DIAGNOSIS — N28.1 RENAL CYST: ICD-10-CM

## 2021-05-06 PROCEDURE — 76775 US EXAM ABDO BACK WALL LIM: CPT

## 2021-05-07 ENCOUNTER — OFFICE VISIT (OUTPATIENT)
Dept: UROLOGY | Facility: CLINIC | Age: 71
End: 2021-05-07

## 2021-05-07 VITALS — HEIGHT: 72 IN | BODY MASS INDEX: 28.69 KG/M2 | WEIGHT: 211.8 LBS | TEMPERATURE: 98.1 F

## 2021-05-07 DIAGNOSIS — N40.1 BPH WITH URINARY OBSTRUCTION: Primary | ICD-10-CM

## 2021-05-07 DIAGNOSIS — N20.0 NEPHROLITHIASIS: ICD-10-CM

## 2021-05-07 DIAGNOSIS — N13.8 BPH WITH URINARY OBSTRUCTION: Primary | ICD-10-CM

## 2021-05-07 DIAGNOSIS — N28.1 RENAL CYST: ICD-10-CM

## 2021-05-07 LAB
BILIRUB BLD-MCNC: NEGATIVE MG/DL
CLARITY, POC: CLEAR
COLOR UR: YELLOW
GLUCOSE UR STRIP-MCNC: NEGATIVE MG/DL
KETONES UR QL: NEGATIVE
LEUKOCYTE EST, POC: NEGATIVE
NITRITE UR-MCNC: NEGATIVE MG/ML
PH UR: 7 [PH] (ref 5–8)
PROT UR STRIP-MCNC: NEGATIVE MG/DL
RBC # UR STRIP: ABNORMAL /UL
SP GR UR: 1.02 (ref 1–1.03)
UROBILINOGEN UR QL: NORMAL

## 2021-05-07 PROCEDURE — 81003 URINALYSIS AUTO W/O SCOPE: CPT | Performed by: UROLOGY

## 2021-05-07 PROCEDURE — 99214 OFFICE O/P EST MOD 30 MIN: CPT | Performed by: UROLOGY

## 2021-06-07 ENCOUNTER — APPOINTMENT (OUTPATIENT)
Dept: CT IMAGING | Facility: HOSPITAL | Age: 71
End: 2021-06-07

## 2021-06-14 ENCOUNTER — HOSPITAL ENCOUNTER (OUTPATIENT)
Dept: CT IMAGING | Facility: HOSPITAL | Age: 71
Discharge: HOME | End: 2021-06-14
Admitting: UROLOGY

## 2021-06-14 DIAGNOSIS — N28.1 RENAL CYST: ICD-10-CM

## 2021-06-14 LAB — CREAT BLDA-MCNC: 1.3 MG/DL (ref 0.6–1.3)

## 2021-06-14 PROCEDURE — 82565 ASSAY OF CREATININE: CPT

## 2021-06-14 PROCEDURE — 74178 CT ABD&PLV WO CNTR FLWD CNTR: CPT

## 2021-06-14 PROCEDURE — 25010000002 IOPAMIDOL 61 % SOLUTION: Performed by: UROLOGY

## 2021-06-14 RX ADMIN — IOPAMIDOL 100 ML: 612 INJECTION, SOLUTION INTRAVENOUS at 12:35

## 2021-06-15 ENCOUNTER — OFFICE VISIT (OUTPATIENT)
Dept: ENT CLINIC | Age: 71
End: 2021-06-15
Payer: MEDICARE

## 2021-06-15 VITALS
DIASTOLIC BLOOD PRESSURE: 64 MMHG | SYSTOLIC BLOOD PRESSURE: 120 MMHG | WEIGHT: 214 LBS | HEIGHT: 72 IN | BODY MASS INDEX: 28.99 KG/M2

## 2021-06-15 DIAGNOSIS — H73.12 CHRONIC MYRINGITIS OF LEFT EAR: ICD-10-CM

## 2021-06-15 DIAGNOSIS — H61.23 IMPACTED CERUMEN, BILATERAL: ICD-10-CM

## 2021-06-15 PROCEDURE — 1123F ACP DISCUSS/DSCN MKR DOCD: CPT | Performed by: OTOLARYNGOLOGY

## 2021-06-15 PROCEDURE — 69210 REMOVE IMPACTED EAR WAX UNI: CPT | Performed by: OTOLARYNGOLOGY

## 2021-06-15 PROCEDURE — G8427 DOCREV CUR MEDS BY ELIG CLIN: HCPCS | Performed by: OTOLARYNGOLOGY

## 2021-06-15 PROCEDURE — 1036F TOBACCO NON-USER: CPT | Performed by: OTOLARYNGOLOGY

## 2021-06-15 PROCEDURE — 3017F COLORECTAL CA SCREEN DOC REV: CPT | Performed by: OTOLARYNGOLOGY

## 2021-06-15 PROCEDURE — G8417 CALC BMI ABV UP PARAM F/U: HCPCS | Performed by: OTOLARYNGOLOGY

## 2021-06-15 PROCEDURE — 4040F PNEUMOC VAC/ADMIN/RCVD: CPT | Performed by: OTOLARYNGOLOGY

## 2021-06-15 RX ORDER — NEOMYCIN SULFATE, POLYMYXIN B SULFATE AND HYDROCORTISONE 10; 3.5; 1 MG/ML; MG/ML; [USP'U]/ML
4 SUSPENSION/ DROPS AURICULAR (OTIC) 3 TIMES DAILY
Qty: 1 BOTTLE | Refills: 3 | Status: SHIPPED | OUTPATIENT
Start: 2021-06-15 | End: 2021-07-06
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
## 2021-06-15 NOTE — ASSESSMENT & PLAN NOTE
Moist cerumen occluding the right ear canal.  Cerumen occluding left ear canal with some element of purulence.   We will clean

## 2021-06-15 NOTE — PROGRESS NOTES
72 y.o.  male presents today for teen follow-up. He has had difficulties with wax accumulations in the past.  He has some fullness in the right ear and is aware of foul odor associated with his left ear. Family History   Problem Relation Age of Onset    Diabetes Mother     Dementia Father     Parkinsonism Brother     Coronary Art Dis Other     High Blood Pressure Other     Arthritis Other     Anemia Other     Alzheimer's Disease Other     Prostate Cancer Other      Social History     Socioeconomic History    Marital status:      Spouse name: Othel Simmonds    Number of children: 2    Years of education: 12    Highest education level: None   Occupational History     Employer: RETIRED   Tobacco Use    Smoking status: Former Smoker     Packs/day: 1.00     Years: 4.00     Pack years: 4.00     Types: Cigarettes     Quit date: 1970     Years since quittin.8    Smokeless tobacco: Never Used   Vaping Use    Vaping Use: Never used   Substance and Sexual Activity    Alcohol use: No    Drug use: No    Sexual activity: Yes     Partners: Female   Other Topics Concern    None   Social History Narrative    None     Social Determinants of Health     Financial Resource Strain:     Difficulty of Paying Living Expenses:    Food Insecurity:     Worried About Running Out of Food in the Last Year:     Ran Out of Food in the Last Year:    Transportation Needs:     Lack of Transportation (Medical):      Lack of Transportation (Non-Medical):    Physical Activity:     Days of Exercise per Week:     Minutes of Exercise per Session:    Stress:     Feeling of Stress :    Social Connections:     Frequency of Communication with Friends and Family:     Frequency of Social Gatherings with Friends and Family:     Attends Sikh Services:     Active Member of Clubs or Organizations:     Attends Club or Organization Meetings:     Marital Status:    Intimate Partner Violence:     Fear of Chronic myringitis of left ear     Granulation tissue near annulus just below attic region. We will treat topically and reevaluate         Impacted cerumen, bilateral     Moist cerumen occluding the right ear canal.  Cerumen occluding left ear canal with some element of purulence. We will clean         Relevant Orders    LA REMOVAL IMPACTED CERUMEN INSTRUMENTATION UNILAT (Completed)          Orders Placed This Encounter   Procedures    LA REMOVAL IMPACTED CERUMEN INSTRUMENTATION UNILAT     Under microscopic guidance wax impactions cleared from both ear canals with aid of office suction       Orders Placed This Encounter   Medications    neomycin-polymyxin-hydrocortisone (CORTISPORIN) 3.5-05114-1 otic suspension     Sig: Place 4 drops into the left ear 3 times daily for 21 days     Dispense:  1 Bottle     Refill:  3             Please note that this chart was generated using dragon dictation software. Although every effort was made to ensure the accuracy of this automated transcription, some errors in transcription may have occurred.

## 2021-06-16 ENCOUNTER — OFFICE VISIT (OUTPATIENT)
Dept: UROLOGY | Facility: CLINIC | Age: 71
End: 2021-06-16

## 2021-06-16 VITALS — WEIGHT: 215.6 LBS | BODY MASS INDEX: 29.2 KG/M2 | HEIGHT: 72 IN | TEMPERATURE: 97.9 F

## 2021-06-16 DIAGNOSIS — N20.0 NEPHROLITHIASIS: ICD-10-CM

## 2021-06-16 DIAGNOSIS — N40.1 BPH WITH URINARY OBSTRUCTION: Primary | ICD-10-CM

## 2021-06-16 DIAGNOSIS — N28.1 RENAL CYST: ICD-10-CM

## 2021-06-16 DIAGNOSIS — N13.8 BPH WITH URINARY OBSTRUCTION: Primary | ICD-10-CM

## 2021-06-16 LAB
BILIRUB BLD-MCNC: NEGATIVE MG/DL
CLARITY, POC: CLEAR
COLOR UR: YELLOW
GLUCOSE UR STRIP-MCNC: NEGATIVE MG/DL
KETONES UR QL: NEGATIVE
LEUKOCYTE EST, POC: NEGATIVE
NITRITE UR-MCNC: NEGATIVE MG/ML
PH UR: 7.5 [PH] (ref 5–8)
PROT UR STRIP-MCNC: NEGATIVE MG/DL
RBC # UR STRIP: ABNORMAL /UL
SP GR UR: 1.02 (ref 1–1.03)
UROBILINOGEN UR QL: NORMAL

## 2021-06-16 PROCEDURE — 99214 OFFICE O/P EST MOD 30 MIN: CPT | Performed by: UROLOGY

## 2021-06-16 PROCEDURE — 81003 URINALYSIS AUTO W/O SCOPE: CPT | Performed by: UROLOGY

## 2021-06-24 ENCOUNTER — TELEPHONE (OUTPATIENT)
Dept: INTERNAL MEDICINE | Facility: CLINIC | Age: 71
End: 2021-06-24

## 2021-06-24 NOTE — TELEPHONE ENCOUNTER
PATIENT HAS BEEN CALLED, HE STATED THAT HE JUST HAD AN MRI ON HIS LOW BACK THIS MORNING AND THE MRI OF HIS NECK ON 06/23/21.  WILL WATCH FOR THESE RESULTS TO COME THROUGH.,

## 2021-06-24 NOTE — TELEPHONE ENCOUNTER
Caller: Rasheed Arevalo    Relationship: Self    Best call back number: [phone] (H)    What is the best time to reach you: ANYTIME    Who are you requesting to speak with (clinical staff, provider,  specific staff member): CLINICAL     Do you know the name of the person who called: CLINICAL     What was the call regarding: STATES HE HAS MRI NOTES BEING FAXED TO DR JAMES FOR HIS NECK AND BACK AND WANTS TO DISCUSS THOSE NOTES AT HIS NEXT APPOINTMENT     REQUESTING A CALL BACK TO VERIFY IF DR JAMES HAS RECEIVED THE FAX     Do you require a callback: YES

## 2021-07-12 ENCOUNTER — OFFICE VISIT (OUTPATIENT)
Dept: INTERNAL MEDICINE | Facility: CLINIC | Age: 71
End: 2021-07-12

## 2021-07-12 VITALS
OXYGEN SATURATION: 98 % | SYSTOLIC BLOOD PRESSURE: 126 MMHG | RESPIRATION RATE: 16 BRPM | BODY MASS INDEX: 28.77 KG/M2 | WEIGHT: 212.4 LBS | DIASTOLIC BLOOD PRESSURE: 78 MMHG | TEMPERATURE: 98.1 F | HEIGHT: 72 IN | HEART RATE: 80 BPM

## 2021-07-12 DIAGNOSIS — M54.2 CHRONIC NECK AND BACK PAIN: Primary | ICD-10-CM

## 2021-07-12 DIAGNOSIS — M54.9 CHRONIC NECK AND BACK PAIN: Primary | ICD-10-CM

## 2021-07-12 DIAGNOSIS — E78.2 MIXED HYPERLIPIDEMIA: ICD-10-CM

## 2021-07-12 DIAGNOSIS — G89.29 CHRONIC NECK AND BACK PAIN: Primary | ICD-10-CM

## 2021-07-12 DIAGNOSIS — Z86.73 HISTORY OF TIA (TRANSIENT ISCHEMIC ATTACK): ICD-10-CM

## 2021-07-12 PROBLEM — H90.3 BILATERAL SENSORINEURAL HEARING LOSS: Status: ACTIVE | Noted: 2020-12-15

## 2021-07-12 PROCEDURE — 99214 OFFICE O/P EST MOD 30 MIN: CPT | Performed by: INTERNAL MEDICINE

## 2021-07-12 RX ORDER — ATORVASTATIN CALCIUM 10 MG/1
10 TABLET, FILM COATED ORAL DAILY
Qty: 90 TABLET | Refills: 3 | Status: SHIPPED | OUTPATIENT
Start: 2021-07-12 | End: 2022-06-08

## 2021-07-12 RX ORDER — ASPIRIN 81 MG/1
81 TABLET ORAL DAILY
COMMUNITY
End: 2021-07-12

## 2021-07-12 NOTE — PROGRESS NOTES
"CC: f/u neck and back pain    History:  Rasheed Arevalo is a 70 y.o. male   He notes he has been doing reasonably well, but is experiencing ongoing neck and back pain despite working with my back over the last several years.  He has a history of neck and back pain that dates back to about 2011 and has used many modalities including injection, physical therapy, chiropractic, and some medication.  He wonders if further action should be taken, but he does not desire surgery at this time.  He remains very active with good exercise tolerance and does not feel his activity is limited by his pain except for when things flare intermittently.       ROS:  Review of Systems   Constitutional: Negative for chills and fever.   Respiratory: Negative for cough and shortness of breath.    Cardiovascular: Negative for chest pain and palpitations.   Gastrointestinal: Negative for abdominal pain.   Genitourinary: Negative for dysuria.   Musculoskeletal: Positive for neck pain.   Neurological: Positive for numbness and headaches. Negative for weakness.        reports that he quit smoking about 51 years ago. He started smoking about 55 years ago. He has a 6.00 pack-year smoking history. He has never used smokeless tobacco. He reports that he does not drink alcohol and does not use drugs.      Current Outpatient Medications:   •  aspirin 81 MG EC tablet, Take 81 mg by mouth Daily., Disp: , Rfl:   •  atorvastatin (LIPITOR) 10 MG tablet, TAKE 1 TABLET BY MOUTH ONCE DAILY, Disp: 90 tablet, Rfl: 0    OBJECTIVE:  /78 (BP Location: Left arm, Patient Position: Sitting, Cuff Size: Adult)   Pulse 80   Temp 98.1 °F (36.7 °C)   Resp 16   Ht 182.9 cm (72\")   Wt 96.3 kg (212 lb 6.4 oz)   SpO2 98%   BMI 28.81 kg/m²    Physical Exam  Constitutional:       General: He is not in acute distress.  Pulmonary:      Effort: Pulmonary effort is normal. No respiratory distress.   Neurological:      Mental Status: He is alert and oriented to person, " place, and time.   Psychiatric:         Mood and Affect: Mood normal.         Behavior: Behavior normal.         Assessment/Plan     Diagnoses and all orders for this visit:    1. Chronic neck and back pain (Primary)  Recent MRI evaluations are reviewed from my neck.  It shows some degenerative changes and stenoses, but no severe narrowing nor obvious operative indications.  He has no symptoms of radiculopathy at this time, but has had these at times in the past.  We discussed the recommendation to maintain mobility and activity, with which he is pleased to continue.  If physical therapy, surgical evaluation, or further work-up is desired, we would be happy to make a referral, but there is no obvious indication for a change of course at this time and he is satisfied to simply monitor for now.     2. History of TIA (transient ischemic attack)  3. Mixed hyperlipidemia  -     atorvastatin (LIPITOR) 10 MG tablet; Take 1 tablet by mouth Daily.  Dispense: 90 tablet; Refill: 3  He notes Dr. Lo's office had doubted whether he had a true TIA, but we will restart statin therapy given his 10-year ASCVD risk, which is largely driven by his gender and age.      An After Visit Summary was printed and given to the patient at discharge.  Return in about 6 months (around 1/12/2022) for Medicare Wellness; cancel August.          Wild Alatorre D.O. 7/12/2021   Electronically signed.

## 2021-07-15 ENCOUNTER — OFFICE VISIT (OUTPATIENT)
Dept: ENT CLINIC | Age: 71
End: 2021-07-15
Payer: MEDICARE

## 2021-07-15 VITALS
DIASTOLIC BLOOD PRESSURE: 66 MMHG | HEIGHT: 72 IN | SYSTOLIC BLOOD PRESSURE: 122 MMHG | WEIGHT: 212.4 LBS | BODY MASS INDEX: 28.77 KG/M2

## 2021-07-15 DIAGNOSIS — H73.12 CHRONIC MYRINGITIS OF LEFT EAR: ICD-10-CM

## 2021-07-15 PROCEDURE — 99212 OFFICE O/P EST SF 10 MIN: CPT | Performed by: OTOLARYNGOLOGY

## 2021-07-15 PROCEDURE — 3017F COLORECTAL CA SCREEN DOC REV: CPT | Performed by: OTOLARYNGOLOGY

## 2021-07-15 PROCEDURE — 1036F TOBACCO NON-USER: CPT | Performed by: OTOLARYNGOLOGY

## 2021-07-15 PROCEDURE — G8417 CALC BMI ABV UP PARAM F/U: HCPCS | Performed by: OTOLARYNGOLOGY

## 2021-07-15 PROCEDURE — 4040F PNEUMOC VAC/ADMIN/RCVD: CPT | Performed by: OTOLARYNGOLOGY

## 2021-07-15 PROCEDURE — G8427 DOCREV CUR MEDS BY ELIG CLIN: HCPCS | Performed by: OTOLARYNGOLOGY

## 2021-07-15 PROCEDURE — 1123F ACP DISCUSS/DSCN MKR DOCD: CPT | Performed by: OTOLARYNGOLOGY

## 2021-07-15 PROCEDURE — 92504 EAR MICROSCOPY EXAMINATION: CPT | Performed by: OTOLARYNGOLOGY

## 2021-07-15 RX ORDER — ATORVASTATIN CALCIUM 10 MG/1
TABLET, FILM COATED ORAL
COMMUNITY
Start: 2021-07-12

## 2021-07-15 NOTE — ASSESSMENT & PLAN NOTE
Much improved but still small amount of granulation tissue near annulus just below attic region.   We will continue with topical therapy
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
## 2021-08-02 ENCOUNTER — TELEPHONE (OUTPATIENT)
Dept: INTERNAL MEDICINE | Facility: CLINIC | Age: 71
End: 2021-08-02

## 2021-08-02 ENCOUNTER — TELEMEDICINE (OUTPATIENT)
Dept: INTERNAL MEDICINE | Facility: CLINIC | Age: 71
End: 2021-08-02

## 2021-08-02 DIAGNOSIS — J01.10 ACUTE NON-RECURRENT FRONTAL SINUSITIS: Primary | ICD-10-CM

## 2021-08-02 DIAGNOSIS — J40 BRONCHITIS: ICD-10-CM

## 2021-08-02 PROCEDURE — 99213 OFFICE O/P EST LOW 20 MIN: CPT | Performed by: NURSE PRACTITIONER

## 2021-08-02 RX ORDER — METHYLPREDNISOLONE 4 MG/1
TABLET ORAL
Qty: 21 EACH | Refills: 0 | Status: SHIPPED | OUTPATIENT
Start: 2021-08-02 | End: 2021-10-06

## 2021-08-02 RX ORDER — AMOXICILLIN AND CLAVULANATE POTASSIUM 875; 125 MG/1; MG/1
1 TABLET, FILM COATED ORAL 2 TIMES DAILY
Qty: 20 TABLET | Refills: 0 | Status: SHIPPED | OUTPATIENT
Start: 2021-08-02 | End: 2021-08-12

## 2021-08-02 NOTE — PROGRESS NOTES
Chief Complaint   Patient presents with   • Illness     You have chosen to receive care through a telehealth visit.  Do you consent to use a video/audio connection for your medical care today? Yes    History:  Rasheed Arevalo is a 71 y.o. male who presents today for follow-up for evaluation of the above:  HPI   Patient presents today via video visit with c/o cough, sinus pain, sore throat and congestions. Has been present and worsening for 5 days. not improving with OTC treatment such as Benadryl and claritin D  Denies fever or body aches. No report of loss of taste or smell. Denies exposure to COVID.   Is vaccinated        ROS:  Review of Systems   Constitutional: Negative for fever.   HENT: Positive for sinus pressure, sinus pain and sore throat.    Eyes: Positive for discharge (watering).   Respiratory: Positive for cough.    All other systems reviewed and are negative.      Mr. Arevalo  reports that he quit smoking about 51 years ago. He started smoking about 55 years ago. He has a 6.00 pack-year smoking history. He has never used smokeless tobacco. He reports that he does not drink alcohol and does not use drugs.      Current Outpatient Medications:   •  atorvastatin (LIPITOR) 10 MG tablet, Take 1 tablet by mouth Daily., Disp: 90 tablet, Rfl: 3  •  amoxicillin-clavulanate (Augmentin) 875-125 MG per tablet, Take 1 tablet by mouth 2 (Two) Times a Day for 10 days., Disp: 20 tablet, Rfl: 0  •  methylPREDNISolone (MEDROL) 4 MG dose pack, Take as directed on package instructions., Disp: 21 each, Rfl: 0      OBJECTIVE:  There were no vitals taken for this visit.   Physical Exam  Constitutional:       General: He is not in acute distress.  Pulmonary:      Effort: No respiratory distress.   Neurological:      Mental Status: He is oriented to person, place, and time.   Psychiatric:         Behavior: Behavior normal.         Assessment/Plan    Diagnoses and all orders for this visit:    1. Acute non-recurrent frontal  sinusitis (Primary)  -     amoxicillin-clavulanate (Augmentin) 875-125 MG per tablet; Take 1 tablet by mouth 2 (Two) Times a Day for 10 days.  Dispense: 20 tablet; Refill: 0  -     methylPREDNISolone (MEDROL) 4 MG dose pack; Take as directed on package instructions.  Dispense: 21 each; Refill: 0    2. Bronchitis  -     amoxicillin-clavulanate (Augmentin) 875-125 MG per tablet; Take 1 tablet by mouth 2 (Two) Times a Day for 10 days.  Dispense: 20 tablet; Refill: 0  -     methylPREDNISolone (MEDROL) 4 MG dose pack; Take as directed on package instructions.  Dispense: 21 each; Refill: 0    offered COVID testing and he declines at this time.   He plans to quarantine at home since he is retired.     7 min     An After Visit Summary was printed and given to the patient at discharge.  Return if symptoms worsen or fail to improve, for Next scheduled follow up. Sooner if problems arise.          [place]in[place] RAI. 8/2/2021   Electronically Signed

## 2021-08-02 NOTE — TELEPHONE ENCOUNTER
PT CALLED STATING HE THINKS HE HAS A SINUS INFECTION; HAS HAD COUGH, CONGESTION, SORE THROAT FOR OVER A WEEK AND TODAY AND YESTERDAY HAS STARTED WITH GREEN MUCUS AND SEVERE SINUS HEADACHE    PT WANTS TO KNOW IF DR. JAMES WOULD CALL IN AN ANTIBIOTIC    CALLBACK [phone]    [facility] [address] [facility] [phone] [place] [phone] FX

## 2021-08-02 NOTE — PATIENT INSTRUCTIONS
Sinusitis, Adult  Sinusitis is soreness and swelling (inflammation) of your sinuses. Sinuses are hollow spaces in the bones around your face. They are located:  · Around your eyes.  · In the middle of your forehead.  · Behind your nose.  · In your cheekbones.  Your sinuses and nasal passages are lined with a fluid called mucus. Mucus drains out of your sinuses. Swelling can trap mucus in your sinuses. This lets germs (bacteria, virus, or fungus) grow, which leads to infection. Most of the time, this condition is caused by a virus.  What are the causes?  This condition is caused by:  · Allergies.  · Asthma.  · Germs.  · Things that block your nose or sinuses.  · Growths in the nose (nasal polyps).  · Chemicals or irritants in the air.  · Fungus (rare).  What increases the risk?  You are more likely to develop this condition if:  · You have a weak body defense system (immune system).  · You do a lot of swimming or diving.  · You use nasal sprays too much.  · You smoke.  What are the signs or symptoms?  The main symptoms of this condition are pain and a feeling of pressure around the sinuses. Other symptoms include:  · Stuffy nose (congestion).  · Runny nose (drainage).  · Swelling and warmth in the sinuses.  · Headache.  · Toothache.  · A cough that may get worse at night.  · Mucus that collects in the throat or the back of the nose (postnasal drip).  · Being unable to smell and taste.  · Being very tired (fatigue).  · A fever.  · Sore throat.  · Bad breath.  How is this diagnosed?  This condition is diagnosed based on:  · Your symptoms.  · Your medical history.  · A physical exam.  · Tests to find out if your condition is short-term (acute) or long-term (chronic). Your doctor may:  ? Check your nose for growths (polyps).  ? Check your sinuses using a tool that has a light (endoscope).  ? Check for allergies or germs.  ? Do imaging tests, such as an MRI or CT scan.  How is this treated?  Treatment for this condition  depends on the cause and whether it is short-term or long-term.  · If caused by a virus, your symptoms should go away on their own within 10 days. You may be given medicines to relieve symptoms. They include:  ? Medicines that shrink swollen tissue in the nose.  ? Medicines that treat allergies (antihistamines).  ? A spray that treats swelling of the nostrils.   ? Rinses that help get rid of thick mucus in your nose (nasal saline washes).  · If caused by bacteria, your doctor may wait to see if you will get better without treatment. You may be given antibiotic medicine if you have:  ? A very bad infection.  ? A weak body defense system.  · If caused by growths in the nose, you may need to have surgery.  Follow these instructions at home:  Medicines  · Take, use, or apply over-the-counter and prescription medicines only as told by your doctor. These may include nasal sprays.  · If you were prescribed an antibiotic medicine, take it as told by your doctor. Do not stop taking the antibiotic even if you start to feel better.  Hydrate and humidify    · Drink enough water to keep your pee (urine) pale yellow.  · Use a cool mist humidifier to keep the humidity level in your home above 50%.  · Breathe in steam for 10-15 minutes, 3-4 times a day, or as told by your doctor. You can do this in the bathroom while a hot shower is running.  · Try not to spend time in cool or dry air.  Rest  · Rest as much as you can.  · Sleep with your head raised (elevated).  · Make sure you get enough sleep each night.  General instructions    · Put a warm, moist washcloth on your face 3-4 times a day, or as often as told by your doctor. This will help with discomfort.  · Wash your hands often with soap and water. If there is no soap and water, use hand .  · Do not smoke. Avoid being around people who are smoking (secondhand smoke).  · Keep all follow-up visits as told by your doctor. This is important.  Contact a doctor if:  · You  have a fever.  · Your symptoms get worse.  · Your symptoms do not get better within 10 days.  Get help right away if:  · You have a very bad headache.  · You cannot stop throwing up (vomiting).  · You have very bad pain or swelling around your face or eyes.  · You have trouble seeing.  · You feel confused.  · Your neck is stiff.  · You have trouble breathing.  Summary  · Sinusitis is swelling of your sinuses. Sinuses are hollow spaces in the bones around your face.  · This condition is caused by tissues in your nose that become inflamed or swollen. This traps germs. These can lead to infection.  · If you were prescribed an antibiotic medicine, take it as told by your doctor. Do not stop taking it even if you start to feel better.  · Keep all follow-up visits as told by your doctor. This is important.  This information is not intended to replace advice given to you by your health care provider. Make sure you discuss any questions you have with your health care provider.  Document Revised: 05/20/2019 Document Reviewed: 05/20/2019  [employer] Patient Education © 2021 [employer]

## 2021-08-16 ENCOUNTER — OFFICE VISIT (OUTPATIENT)
Dept: ENT CLINIC | Age: 71
End: 2021-08-16
Payer: MEDICARE

## 2021-08-16 VITALS — WEIGHT: 208 LBS | BODY MASS INDEX: 28.17 KG/M2 | HEIGHT: 72 IN

## 2021-08-16 DIAGNOSIS — J34.89 NASAL OBSTRUCTION: ICD-10-CM

## 2021-08-16 DIAGNOSIS — H73.12 CHRONIC MYRINGITIS OF LEFT EAR: ICD-10-CM

## 2021-08-16 DIAGNOSIS — G47.33 OBSTRUCTIVE SLEEP APNEA: ICD-10-CM

## 2021-08-16 PROCEDURE — 99213 OFFICE O/P EST LOW 20 MIN: CPT | Performed by: OTOLARYNGOLOGY

## 2021-08-16 PROCEDURE — G8427 DOCREV CUR MEDS BY ELIG CLIN: HCPCS | Performed by: OTOLARYNGOLOGY

## 2021-08-16 PROCEDURE — 31575 DIAGNOSTIC LARYNGOSCOPY: CPT | Performed by: OTOLARYNGOLOGY

## 2021-08-16 PROCEDURE — 4040F PNEUMOC VAC/ADMIN/RCVD: CPT | Performed by: OTOLARYNGOLOGY

## 2021-08-16 PROCEDURE — G8417 CALC BMI ABV UP PARAM F/U: HCPCS | Performed by: OTOLARYNGOLOGY

## 2021-08-16 PROCEDURE — 1123F ACP DISCUSS/DSCN MKR DOCD: CPT | Performed by: OTOLARYNGOLOGY

## 2021-08-16 PROCEDURE — 3017F COLORECTAL CA SCREEN DOC REV: CPT | Performed by: OTOLARYNGOLOGY

## 2021-08-16 PROCEDURE — 1036F TOBACCO NON-USER: CPT | Performed by: OTOLARYNGOLOGY

## 2021-08-16 RX ORDER — LORATADINE AND PSEUDOEPHEDRINE SULFATE 10; 240 MG/1; MG/1
1 TABLET, EXTENDED RELEASE ORAL DAILY
Qty: 14 TABLET | Refills: 5 | Status: SHIPPED | OUTPATIENT
Start: 2021-08-16 | End: 2021-08-30

## 2021-08-16 RX ORDER — FLUTICASONE PROPIONATE 50 MCG
SPRAY, SUSPENSION (ML) NASAL
Qty: 1 BOTTLE | Refills: 5 | Status: SHIPPED | OUTPATIENT
Start: 2021-08-16 | End: 2022-07-05

## 2021-08-16 NOTE — ASSESSMENT & PLAN NOTE
Patient has history of obstructive sleep apnea and is on a positive pressure device. On my exam he has definite nasal obstruction. He is also somewhat retrognathic which narrows his posterior airway space. He is status post tonsillectomy. He is well adjusted to using his positive pressure device at bedtime. He might gain benefit from an oral appliance to advance his mandible. I think he would certainly benefit from improvement of his nasal airway.

## 2021-08-16 NOTE — ASSESSMENT & PLAN NOTE
Patient is a longtime mouth breather. Utilizing positive pressure device at nighttime  On exam he has a deviated nasal septum and turbinate congestion. No he is using a positive pressure device improving his nasal airway will allow the device to work more efficiency at lower pressures often improving compliance and efficacy. For now I am recommending Flonase and Claritin-D 24. I am also recommending Breathe Right nasal strips at nighttime. Statement Selected
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
## 2021-10-05 ENCOUNTER — TELEPHONE (OUTPATIENT)
Dept: INTERNAL MEDICINE | Facility: CLINIC | Age: 71
End: 2021-10-05

## 2021-10-05 NOTE — TELEPHONE ENCOUNTER
Caller: Rasheed Arevalo    Relationship: Self    Best call back number: [phone]    What medication are you requesting: STEROID MEDICATION     What are your current symptoms: BACK PAIN    How long have you been experiencing symptoms: COUPLE OF WEEKS  Have you had these symptoms before:    [] Yes  [x] No    Have you been treated for these symptoms before:   [] Yes  [x] No    If a prescription is needed, what is your preferred pharmacy and phone number: [place], [place] - [address] [facility] [phone] [facility] [phone] FX     PATIENT WILL BE LEAVING AND GOING OUT OF [facility] ON 10/09/2021 FOR TO DAYS

## 2021-10-06 ENCOUNTER — OFFICE VISIT (OUTPATIENT)
Dept: INTERNAL MEDICINE | Facility: CLINIC | Age: 71
End: 2021-10-06

## 2021-10-06 VITALS
RESPIRATION RATE: 16 BRPM | DIASTOLIC BLOOD PRESSURE: 66 MMHG | HEART RATE: 78 BPM | OXYGEN SATURATION: 96 % | HEIGHT: 72 IN | WEIGHT: 210.8 LBS | TEMPERATURE: 97.7 F | BODY MASS INDEX: 28.55 KG/M2 | SYSTOLIC BLOOD PRESSURE: 108 MMHG

## 2021-10-06 DIAGNOSIS — I47.1 SUPRAVENTRICULAR TACHYCARDIA (HCC): ICD-10-CM

## 2021-10-06 DIAGNOSIS — G89.29 CHRONIC MIDLINE LOW BACK PAIN WITH BILATERAL SCIATICA: ICD-10-CM

## 2021-10-06 DIAGNOSIS — Z99.89 OSA ON CPAP: ICD-10-CM

## 2021-10-06 DIAGNOSIS — Z00.00 MEDICARE ANNUAL WELLNESS VISIT, SUBSEQUENT: Primary | ICD-10-CM

## 2021-10-06 DIAGNOSIS — E78.2 MIXED HYPERLIPIDEMIA: ICD-10-CM

## 2021-10-06 DIAGNOSIS — M54.41 CHRONIC MIDLINE LOW BACK PAIN WITH BILATERAL SCIATICA: ICD-10-CM

## 2021-10-06 DIAGNOSIS — M54.42 CHRONIC MIDLINE LOW BACK PAIN WITH BILATERAL SCIATICA: ICD-10-CM

## 2021-10-06 DIAGNOSIS — Z23 NEED FOR INFLUENZA VACCINATION: ICD-10-CM

## 2021-10-06 DIAGNOSIS — G47.33 OSA ON CPAP: ICD-10-CM

## 2021-10-06 PROCEDURE — G0439 PPPS, SUBSEQ VISIT: HCPCS | Performed by: NURSE PRACTITIONER

## 2021-10-06 PROCEDURE — 90662 IIV NO PRSV INCREASED AG IM: CPT | Performed by: NURSE PRACTITIONER

## 2021-10-06 PROCEDURE — 1159F MED LIST DOCD IN RCRD: CPT | Performed by: NURSE PRACTITIONER

## 2021-10-06 PROCEDURE — 1170F FXNL STATUS ASSESSED: CPT | Performed by: NURSE PRACTITIONER

## 2021-10-06 PROCEDURE — G0008 ADMIN INFLUENZA VIRUS VAC: HCPCS | Performed by: NURSE PRACTITIONER

## 2021-10-06 PROCEDURE — 1125F AMNT PAIN NOTED PAIN PRSNT: CPT | Performed by: NURSE PRACTITIONER

## 2021-10-06 PROCEDURE — 99214 OFFICE O/P EST MOD 30 MIN: CPT | Performed by: NURSE PRACTITIONER

## 2021-10-06 PROCEDURE — 96160 PT-FOCUSED HLTH RISK ASSMT: CPT | Performed by: NURSE PRACTITIONER

## 2021-10-06 RX ORDER — FLUTICASONE PROPIONATE 50 MCG
1 SPRAY, SUSPENSION (ML) NASAL DAILY
COMMUNITY
End: 2021-10-06

## 2021-10-06 RX ORDER — MELOXICAM 7.5 MG/1
7.5 TABLET ORAL DAILY
Qty: 30 TABLET | Refills: 3 | Status: SHIPPED | OUTPATIENT
Start: 2021-10-06 | End: 2022-01-14

## 2021-10-06 RX ORDER — LORATADINE/PSEUDOEPHEDRINE 10MG-240MG
TABLET, EXTENDED RELEASE 24 HR ORAL
COMMUNITY
Start: 2021-08-16 | End: 2022-01-14

## 2021-10-06 RX ORDER — FLUTICASONE PROPIONATE 50 MCG
2 SPRAY, SUSPENSION (ML) NASAL DAILY
COMMUNITY
Start: 2021-08-16 | End: 2022-10-14

## 2021-10-06 NOTE — PROGRESS NOTES
Chief Complaint   Patient presents with   • Medicare Wellness-subsequent   • Back Pain       History:  Rasheed Arevalo is a 71 y.o. male who presents today for follow-up for evaluation of the above:    HPI     Patient present today with c/o worsening low back pain.   Answers for HPI/ROS submitted by the patient on 10/5/2021  What is the primary reason for your visit?: Back Pain  This is a chronic pain. Has been working with [facility] for PT and seeing [facility] for lower back pain. Has recently helped his kids move furniture and having increase in pain.   Since end of August lower back has been more sore.     Leaving Saturday and going out of town.  Has had good results with steroids in the past.                  ROS:  Review of Systems   Constitutional: Negative for fever.   Cardiovascular: Negative for chest pain.   Gastrointestinal: Negative for abdominal pain.   Genitourinary: Negative for dysuria.   Musculoskeletal: Positive for back pain.   Neurological: Positive for weakness. Negative for numbness and headaches.       Mr. Arevalo  reports that he quit smoking about 51 years ago. He started smoking about 55 years ago. He has a 6.00 pack-year smoking history. He has never used smokeless tobacco. He reports that he does not drink alcohol and does not use drugs.      Current Outpatient Medications:   •  atorvastatin (LIPITOR) 10 MG tablet, Take 1 tablet by mouth Daily., Disp: 90 tablet, Rfl: 3  •  fluticasone (FLONASE) 50 MCG/ACT nasal spray, 2 sprays into the nostril(s) as directed by provider Daily., Disp: , Rfl:   •  Loratadine-D 24HR  MG per 24 hr tablet, TAKE 1 TABLET BY MOUTH DAILY FOR 14 DAYS TAKE 1 TABLET FIRST THING IN A.M. UPON ARISING, Disp: , Rfl:   •  meloxicam (Mobic) 7.5 MG tablet, Take 1 tablet by mouth Daily., Disp: 30 tablet, Rfl: 3      OBJECTIVE:  /66 (BP Location: Left arm, Patient Position: Sitting, Cuff Size: Large Adult)   Pulse 78   Temp 97.7 °F (36.5 °C) (Temporal)   Resp 16   " Ht 182.9 cm (72\")   Wt 95.6 kg (210 lb 12.8 oz)   SpO2 96%   BMI 28.59 kg/m²    Physical Exam  Vitals reviewed.   Constitutional:       Appearance: He is well-developed.   HENT:      Head: Normocephalic and atraumatic.   Eyes:      Conjunctiva/sclera: Conjunctivae normal.      Pupils: Pupils are equal, round, and reactive to light.   Cardiovascular:      Rate and Rhythm: Normal rate and regular rhythm.      Heart sounds: Normal heart sounds.   Pulmonary:      Effort: Pulmonary effort is normal.      Breath sounds: Normal breath sounds.   Abdominal:      General: Bowel sounds are normal.      Palpations: Abdomen is soft.   Musculoskeletal:      Cervical back: Normal range of motion and neck supple.      Lumbar back: Tenderness and bony tenderness present. Decreased range of motion.   Skin:     General: Skin is warm and dry.   Neurological:      Mental Status: He is alert and oriented to person, place, and time.      Deep Tendon Reflexes: Reflexes are normal and symmetric.         Assessment/Plan    Diagnoses and all orders for this visit:    1. Medicare annual wellness visit, subsequent (Primary)  -     Lipid panel; Future  -     Comprehensive metabolic panel; Future  -     CBC No Differential; Future    2. Need for influenza vaccination  -     Fluzone High-Dose 65+yrs (9038-8703)    3. Chronic midline low back pain with bilateral sciatica  -     meloxicam (Mobic) 7.5 MG tablet; Take 1 tablet by mouth Daily.  Dispense: 30 tablet; Refill: 3  He recently had steroid danielito in August for sinus issues. Discussed that frequent steroid use can increase blood sugar. He is willing to try Mobic at this time. Advised to let me know if not improving and could consider prednisone oral.     4. NAVNEET on CPAP  Comments:  using with benefit and compliance     5. Mixed hyperlipidemia  Labs to monitor as above. Continues on statin.     6. Supraventricular tachycardia (HCC)  Stable without acute episode.          An After Visit Summary " was printed and given to the patient at discharge.  Return if symptoms worsen or fail to improve, for Next scheduled follow up. Sooner if problems arise.          [place] RAI. 10/6/2021   Electronically Signed

## 2021-10-06 NOTE — PROGRESS NOTES
The ABCs of the Annual Wellness Visit  Subsequent Medicare Wellness Visit    Chief Complaint   Patient presents with   • Medicare Wellness-subsequent   • Back Pain      Subjective    History of Present Illness:  Rasheed Arevalo is a 71 y.o. male who presents for a Subsequent Medicare Wellness Visit.  See associated note.     The following portions of the patient's history were reviewed and   updated as appropriate: allergies, current medications, past family history, past medical history, past social history, past surgical history and problem list.    Compared to one year ago, the patient feels his physical   health is the same.    Compared to one year ago, the patient feels his mental   health is the same.    Recent Hospitalizations:  He was not admitted to the hospital during the last year.       Current Medical Providers:  Patient Care Team:  Wild Alatorre DO as PCP - General (Internal Medicine)  Shaye Acevedo APRN (Inactive) as Nurse Practitioner (Otolaryngology)  Gavin Kitchen MD as Consulting Physician (Otolaryngology)    Outpatient Medications Prior to Visit   Medication Sig Dispense Refill   • atorvastatin (LIPITOR) 10 MG tablet Take 1 tablet by mouth Daily. 90 tablet 3   • fluticasone (FLONASE) 50 MCG/ACT nasal spray 2 sprays into the nostril(s) as directed by provider Daily.     • Loratadine-D 24HR  MG per 24 hr tablet TAKE 1 TABLET BY MOUTH DAILY FOR 14 DAYS TAKE 1 TABLET FIRST THING IN A.M. UPON ARISING     • fluticasone (FLONASE) 50 MCG/ACT nasal spray 1 spray into the nostril(s) as directed by provider Daily.     • methylPREDNISolone (MEDROL) 4 MG dose pack Take as directed on package instructions. 21 each 0     No facility-administered medications prior to visit.       No opioid medication identified on active medication list. I have reviewed chart for other potential  high risk medication/s and harmful drug interactions in the elderly.          Aspirin is not on active medication  "list.  Aspirin use is not indicated based on review of current medical condition/s. Risk of harm outweighs potential benefits.  .    Patient Active Problem List   Diagnosis   • Ataxia   • Mixed hyperlipidemia   • History of TIA (transient ischemic attack)   • Renal cysts, acquired, bilateral   • Benign non-nodular prostatic hyperplasia without lower urinary tract symptoms   • Chronic non-seasonal allergic rhinitis   • History of kidney stones   • Overweight with body mass index (BMI) of 29 to 29.9 in adult   • Supraventricular tachycardia (HCC)   • NAVNEET on CPAP   • Bilateral sensorineural hearing loss   • Chronic neck and back pain     Advance Care Planning  Advance Directive is on file.  ACP discussion was held with the patient during this visit. Patient has an advance directive in EMR which is still valid.      Review of Systems  See associated note      Objective    Vitals:    10/06/21 0945   BP: 108/66   BP Location: Left arm   Patient Position: Sitting   Cuff Size: Large Adult   Pulse: 78   Resp: 16   Temp: 97.7 °F (36.5 °C)   TempSrc: Temporal   SpO2: 96%   Weight: 95.6 kg (210 lb 12.8 oz)   Height: 182.9 cm (72\")     BMI Readings from Last 1 Encounters:   10/06/21 28.59 kg/m²   BMI is above normal parameters. Recommendations include: nutrition counseling    Does the patient have evidence of cognitive impairment? No    Physical Exam   see associated note          HEALTH RISK ASSESSMENT    Smoking Status:  Social History     Tobacco Use   Smoking Status Former Smoker   • Packs/day: 1.00   • Years: 6.00   • Pack years: 6.00   • Start date: 1966   • Quit date: 1970   • Years since quittin.7   Smokeless Tobacco Never Used   Tobacco Comment    Smoked cigarettes when young.  Quit in college.  Never since.     Alcohol Consumption:  Social History     Substance and Sexual Activity   Alcohol Use No     Fall Risk Screen:    STEADI Fall Risk Assessment has not been completed.    Depression " Screening:  PHQ-2/PHQ-9 Depression Screening 7/12/2021   Little interest or pleasure in doing things 0   Feeling down, depressed, or hopeless 0   Total Score 0       Health Habits and Functional and Cognitive Screening:  Functional & Cognitive Status 10/6/2021   Do you have difficulty preparing food and eating? No   Do you have difficulty bathing yourself, getting dressed or grooming yourself? Yes   Do you have difficulty using the toilet? No   Do you have difficulty moving around from place to place? Yes   Do you have trouble with steps or getting out of a bed or a chair? Yes   Current Diet Unhealthy Diet   Dental Exam Up to date   Eye Exam Not up to date   Exercise (times per week) 3 times per week   Current Exercises Include Cardiovascular Workout   Current Exercise Activities Include -   Do you need help using the phone?  No   Are you deaf or do you have serious difficulty hearing?  No   Do you need help with transportation? No   Do you need help shopping? No   Do you need help preparing meals?  No   Do you need help with housework?  Yes   Do you need help with laundry? Yes   Do you need help taking your medications? No   Do you need help managing money? No   Do you ever drive or ride in a car without wearing a seat belt? No   Have you felt unusual stress, anger or loneliness in the last month? No   Who do you live with? Spouse   If you need help, do you have trouble finding someone available to you? No   Have you been bothered in the last four weeks by sexual problems? No   Do you have difficulty concentrating, remembering or making decisions? No       Age-appropriate Screening Schedule:  Refer to the list below for future screening recommendations based on patient's age, sex and/or medical conditions. Orders for these recommended tests are listed in the plan section. The patient has been provided with a written plan.    Health Maintenance   Topic Date Due   • LIPID PANEL  08/03/2021   • TDAP/TD VACCINES (2 -  Td or Tdap) 08/16/2027   • INFLUENZA VACCINE  Completed   • ZOSTER VACCINE  Completed              Assessment/Plan   CMS Preventative Services Quick Reference  Risk Factors Identified During Encounter  Chronic Pain   Immunizations Discussed/Encouraged (specific Immunizations; Influenza  Obesity/Overweight   The above risks/problems have been discussed with the patient.  Follow up actions/plans if indicated are seen below in the Assessment/Plan Section.  Pertinent information has been shared with the patient in the After Visit Summary.    Diagnoses and all orders for this visit:    1. Medicare annual wellness visit, subsequent (Primary)  -     Lipid panel; Future  -     Comprehensive metabolic panel; Future  -     CBC No Differential; Future    2. Need for influenza vaccination  -     Fluzone High-Dose 65+yrs ([id number])    3. Chronic midline low back pain with bilateral sciatica  -     meloxicam (Mobic) 7.5 MG tablet; Take 1 tablet by mouth Daily.  Dispense: 30 tablet; Refill: 3    4. NAVNEET on CPAP  Comments:  using with benefit and compliance     5. Mixed hyperlipidemia    6. Supraventricular tachycardia (HCC)        Follow Up:   Return if symptoms worsen or fail to improve, for Next scheduled follow up.     An After Visit Summary and PPPS were made available to the patient.                   Answers for HPI/ROS submitted by the patient on 10/5/2021  What is the primary reason for your visit?: Back Pain  Chronicity: chronic  Onset: more than 1 month ago  Frequency: constantly  Progression since onset: gradually worsening  Pain location: sacro-iliac  Pain quality: aching, stabbing  Radiates to: left knee  Pain - numeric: 3/10  Pain is: the same all the time  Aggravated by: bending, position, twisting  Stiffness is present: all day  bladder incontinence: No  bowel incontinence: No  leg pain: No  paresis: No  paresthesias: No  pelvic pain: Yes  perianal numbness: No  tingling: No  weight loss: No  Risk factors: obesity,  poor posture

## 2022-01-07 ENCOUNTER — LAB (OUTPATIENT)
Dept: LAB | Facility: HOSPITAL | Age: 72
End: 2022-01-07

## 2022-01-07 DIAGNOSIS — Z00.00 MEDICARE ANNUAL WELLNESS VISIT, SUBSEQUENT: ICD-10-CM

## 2022-01-07 LAB
ALBUMIN SERPL-MCNC: 4.2 G/DL (ref 3.5–5.2)
ALBUMIN/GLOB SERPL: 1.6 G/DL
ALP SERPL-CCNC: 61 U/L (ref 39–117)
ALT SERPL W P-5'-P-CCNC: 31 U/L (ref 1–41)
ANION GAP SERPL CALCULATED.3IONS-SCNC: 9.3 MMOL/L (ref 5–15)
AST SERPL-CCNC: 44 U/L (ref 1–40)
BILIRUB SERPL-MCNC: 0.6 MG/DL (ref 0–1.2)
BUN SERPL-MCNC: 21 MG/DL (ref 8–23)
BUN/CREAT SERPL: 17.4 (ref 7–25)
CALCIUM SPEC-SCNC: 9.2 MG/DL (ref 8.6–10.5)
CHLORIDE SERPL-SCNC: 106 MMOL/L (ref 98–107)
CHOLEST SERPL-MCNC: 130 MG/DL (ref 0–200)
CO2 SERPL-SCNC: 25.7 MMOL/L (ref 22–29)
CREAT SERPL-MCNC: 1.21 MG/DL (ref 0.76–1.27)
DEPRECATED RDW RBC AUTO: 42.2 FL (ref 37–54)
ERYTHROCYTE [DISTWIDTH] IN BLOOD BY AUTOMATED COUNT: 12.7 % (ref 12.3–15.4)
GFR SERPL CREATININE-BSD FRML MDRD: 59 ML/MIN/1.73
GLOBULIN UR ELPH-MCNC: 2.7 GM/DL
GLUCOSE SERPL-MCNC: 85 MG/DL (ref 65–99)
HCT VFR BLD AUTO: 41.4 % (ref 37.5–51)
HDLC SERPL-MCNC: 40 MG/DL (ref 40–60)
HGB BLD-MCNC: 14 G/DL (ref 13–17.7)
LDLC SERPL CALC-MCNC: 70 MG/DL (ref 0–100)
LDLC/HDLC SERPL: 1.7 {RATIO}
MCH RBC QN AUTO: 31 PG (ref 26.6–33)
MCHC RBC AUTO-ENTMCNC: 33.8 G/DL (ref 31.5–35.7)
MCV RBC AUTO: 91.6 FL (ref 79–97)
PLATELET # BLD AUTO: 259 10*3/MM3 (ref 140–450)
PMV BLD AUTO: 11.3 FL (ref 6–12)
POTASSIUM SERPL-SCNC: 4.5 MMOL/L (ref 3.5–5.2)
PROT SERPL-MCNC: 6.9 G/DL (ref 6–8.5)
RBC # BLD AUTO: 4.52 10*6/MM3 (ref 4.14–5.8)
SODIUM SERPL-SCNC: 141 MMOL/L (ref 136–145)
TRIGL SERPL-MCNC: 111 MG/DL (ref 0–150)
VLDLC SERPL-MCNC: 20 MG/DL (ref 5–40)
WBC NRBC COR # BLD: 6.27 10*3/MM3 (ref 3.4–10.8)

## 2022-01-07 PROCEDURE — 36415 COLL VENOUS BLD VENIPUNCTURE: CPT

## 2022-01-07 PROCEDURE — 80053 COMPREHEN METABOLIC PANEL: CPT

## 2022-01-07 PROCEDURE — 80061 LIPID PANEL: CPT

## 2022-01-07 PROCEDURE — 85027 COMPLETE CBC AUTOMATED: CPT

## 2022-01-14 ENCOUNTER — OFFICE VISIT (OUTPATIENT)
Dept: INTERNAL MEDICINE | Facility: CLINIC | Age: 72
End: 2022-01-14

## 2022-01-14 VITALS
HEIGHT: 72 IN | WEIGHT: 213.8 LBS | OXYGEN SATURATION: 98 % | HEART RATE: 77 BPM | SYSTOLIC BLOOD PRESSURE: 118 MMHG | BODY MASS INDEX: 28.96 KG/M2 | DIASTOLIC BLOOD PRESSURE: 70 MMHG | TEMPERATURE: 98.1 F | RESPIRATION RATE: 16 BRPM

## 2022-01-14 DIAGNOSIS — G89.29 CHRONIC NECK AND BACK PAIN: ICD-10-CM

## 2022-01-14 DIAGNOSIS — M54.9 CHRONIC NECK AND BACK PAIN: ICD-10-CM

## 2022-01-14 DIAGNOSIS — M54.2 CHRONIC NECK AND BACK PAIN: ICD-10-CM

## 2022-01-14 DIAGNOSIS — E78.2 MIXED HYPERLIPIDEMIA: Primary | ICD-10-CM

## 2022-01-14 DIAGNOSIS — E66.3 OVERWEIGHT WITH BODY MASS INDEX (BMI) OF 29 TO 29.9 IN ADULT: ICD-10-CM

## 2022-01-14 PROCEDURE — 99213 OFFICE O/P EST LOW 20 MIN: CPT | Performed by: INTERNAL MEDICINE

## 2022-01-14 NOTE — PROGRESS NOTES
"CC: f/u hyperlipidemia    History:  Rasheed Arevalo is a 71 y.o. male   He notes he has been doing well without any acute illness and continues on atorvastatin related to his hyperlipidemia and has tolerated this well.  He has been attempting to diet and exercise with a goal of getting his weight down to about 190.       ROS:  Review of Systems   Respiratory: Negative for cough and shortness of breath.    Cardiovascular: Negative for chest pain and palpitations.   Musculoskeletal: Positive for arthralgias and back pain.        reports that he quit smoking about 52 years ago. He started smoking about 55 years ago. He has a 6.00 pack-year smoking history. He has never used smokeless tobacco. He reports that he does not drink alcohol and does not use drugs.      Current Outpatient Medications:   •  atorvastatin (LIPITOR) 10 MG tablet, Take 1 tablet by mouth Daily., Disp: 90 tablet, Rfl: 3  •  fluticasone (FLONASE) 50 MCG/ACT nasal spray, 2 sprays into the nostril(s) as directed by provider Daily., Disp: , Rfl:     OBJECTIVE:  /70 (BP Location: Left arm, Patient Position: Sitting, Cuff Size: Adult)   Pulse 77   Temp 98.1 °F (36.7 °C)   Resp 16   Ht 182.9 cm (72\")   Wt 97 kg (213 lb 12.8 oz)   SpO2 98%   BMI 29.00 kg/m²    Physical Exam  Constitutional:       General: He is not in acute distress.  Pulmonary:      Effort: Pulmonary effort is normal. No respiratory distress.   Neurological:      Mental Status: He is alert and oriented to person, place, and time.   Psychiatric:         Mood and Affect: Mood normal.         Behavior: Behavior normal.       CBC No Differential (01/07/2022 11:24)   Comprehensive metabolic panel (01/07/2022 11:24)   Lipid panel (01/07/2022 11:24)     Assessment/Plan     Diagnoses and all orders for this visit:    1. Mixed hyperlipidemia (Primary)  Stable on moderate intensity statin therapy per ACC/AHA guidelines.    2. Chronic neck and back pain  Improving with working with a new " PT with some improvements.     3. Overweight with body mass index (BMI) of 29 to 29.9 in adult  Recommended attention to portion control and being careful about the types and timing of meals for the purpose of weight management.      An After Visit Summary was printed and given to the patient at discharge.  Return in about 9 months (around 10/14/2022) for Medicare Wellness with me.          Wild Alatorre D.O. 1/14/2022   Electronically signed.

## 2022-02-17 ENCOUNTER — OFFICE VISIT (OUTPATIENT)
Dept: ENT CLINIC | Age: 72
End: 2022-02-17
Payer: MEDICARE

## 2022-02-17 VITALS — WEIGHT: 208 LBS | BODY MASS INDEX: 28.17 KG/M2 | HEIGHT: 72 IN

## 2022-02-17 DIAGNOSIS — H73.12 CHRONIC MYRINGITIS OF LEFT EAR: ICD-10-CM

## 2022-02-17 PROCEDURE — 1123F ACP DISCUSS/DSCN MKR DOCD: CPT | Performed by: OTOLARYNGOLOGY

## 2022-02-17 PROCEDURE — G8417 CALC BMI ABV UP PARAM F/U: HCPCS | Performed by: OTOLARYNGOLOGY

## 2022-02-17 PROCEDURE — G8427 DOCREV CUR MEDS BY ELIG CLIN: HCPCS | Performed by: OTOLARYNGOLOGY

## 2022-02-17 PROCEDURE — 4040F PNEUMOC VAC/ADMIN/RCVD: CPT | Performed by: OTOLARYNGOLOGY

## 2022-02-17 PROCEDURE — 1036F TOBACCO NON-USER: CPT | Performed by: OTOLARYNGOLOGY

## 2022-02-17 PROCEDURE — 3017F COLORECTAL CA SCREEN DOC REV: CPT | Performed by: OTOLARYNGOLOGY

## 2022-02-17 PROCEDURE — G8484 FLU IMMUNIZE NO ADMIN: HCPCS | Performed by: OTOLARYNGOLOGY

## 2022-02-17 PROCEDURE — 69210 REMOVE IMPACTED EAR WAX UNI: CPT | Performed by: OTOLARYNGOLOGY

## 2022-02-17 PROCEDURE — 99213 OFFICE O/P EST LOW 20 MIN: CPT | Performed by: OTOLARYNGOLOGY

## 2022-02-17 RX ORDER — NEOMYCIN SULFATE, POLYMYXIN B SULFATE AND HYDROCORTISONE 10; 3.5; 1 MG/ML; MG/ML; [USP'U]/ML
4 SUSPENSION/ DROPS AURICULAR (OTIC) 3 TIMES DAILY
Qty: 1 EACH | Refills: 3 | Status: SHIPPED | OUTPATIENT
Start: 2022-02-17 | End: 2022-02-27
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
## 2022-03-10 ENCOUNTER — OFFICE VISIT (OUTPATIENT)
Dept: ENT CLINIC | Age: 72
End: 2022-03-10
Payer: MEDICARE

## 2022-03-10 VITALS — BODY MASS INDEX: 28.17 KG/M2 | WEIGHT: 208 LBS | HEIGHT: 72 IN

## 2022-03-10 DIAGNOSIS — H73.12 CHRONIC MYRINGITIS OF LEFT EAR: Primary | ICD-10-CM

## 2022-03-10 PROCEDURE — 3017F COLORECTAL CA SCREEN DOC REV: CPT | Performed by: OTOLARYNGOLOGY

## 2022-03-10 PROCEDURE — 4040F PNEUMOC VAC/ADMIN/RCVD: CPT | Performed by: OTOLARYNGOLOGY

## 2022-03-10 PROCEDURE — 1036F TOBACCO NON-USER: CPT | Performed by: OTOLARYNGOLOGY

## 2022-03-10 PROCEDURE — G8427 DOCREV CUR MEDS BY ELIG CLIN: HCPCS | Performed by: OTOLARYNGOLOGY

## 2022-03-10 PROCEDURE — 92504 EAR MICROSCOPY EXAMINATION: CPT | Performed by: OTOLARYNGOLOGY

## 2022-03-10 PROCEDURE — G8484 FLU IMMUNIZE NO ADMIN: HCPCS | Performed by: OTOLARYNGOLOGY

## 2022-03-10 PROCEDURE — 1123F ACP DISCUSS/DSCN MKR DOCD: CPT | Performed by: OTOLARYNGOLOGY

## 2022-03-10 PROCEDURE — G8417 CALC BMI ABV UP PARAM F/U: HCPCS | Performed by: OTOLARYNGOLOGY

## 2022-03-10 PROCEDURE — 99212 OFFICE O/P EST SF 10 MIN: CPT | Performed by: OTOLARYNGOLOGY

## 2022-03-10 NOTE — PROGRESS NOTES
73-year-old male returns today for microscopic ear exam.  Overall there is still a small amount of granulation tissue near the annulus at about 2:00 at the posteriorsuperior quadrant of the TM. Although it continues to slowly resolve that is still apparent. He has no pain. There is some occasional discharge.   He will continue with topical therapy and I will check him back next month

## 2022-03-10 NOTE — ASSESSMENT & PLAN NOTE
Persistent granulation tissue at posteriorsuperior quadrant of TM just inside annulus. Slight improvement but no resolution.   Continue with topical therapy

## 2022-04-05 ENCOUNTER — OFFICE VISIT (OUTPATIENT)
Dept: ENT CLINIC | Age: 72
End: 2022-04-05
Payer: MEDICARE

## 2022-04-05 VITALS
BODY MASS INDEX: 28.82 KG/M2 | WEIGHT: 212.8 LBS | SYSTOLIC BLOOD PRESSURE: 120 MMHG | TEMPERATURE: 98.1 F | DIASTOLIC BLOOD PRESSURE: 64 MMHG | HEIGHT: 72 IN

## 2022-04-05 DIAGNOSIS — H73.12 CHRONIC MYRINGITIS OF LEFT EAR: ICD-10-CM

## 2022-04-05 PROCEDURE — G8417 CALC BMI ABV UP PARAM F/U: HCPCS | Performed by: OTOLARYNGOLOGY

## 2022-04-05 PROCEDURE — 4040F PNEUMOC VAC/ADMIN/RCVD: CPT | Performed by: OTOLARYNGOLOGY

## 2022-04-05 PROCEDURE — 1036F TOBACCO NON-USER: CPT | Performed by: OTOLARYNGOLOGY

## 2022-04-05 PROCEDURE — G8427 DOCREV CUR MEDS BY ELIG CLIN: HCPCS | Performed by: OTOLARYNGOLOGY

## 2022-04-05 PROCEDURE — 3017F COLORECTAL CA SCREEN DOC REV: CPT | Performed by: OTOLARYNGOLOGY

## 2022-04-05 PROCEDURE — 1123F ACP DISCUSS/DSCN MKR DOCD: CPT | Performed by: OTOLARYNGOLOGY

## 2022-04-05 PROCEDURE — 99212 OFFICE O/P EST SF 10 MIN: CPT | Performed by: OTOLARYNGOLOGY

## 2022-04-05 NOTE — ASSESSMENT & PLAN NOTE
Evidence of improvement on exam.  Hyperemia at about 2:00 at annular ring with punctate granulation at TM. No evidence of progression. No evidence of middle ear involvement. Normal hearing.   At this point will discontinue topical therapy and reevaluate in 2 to 3 months

## 2022-04-05 NOTE — PROGRESS NOTES
10year-old male returns today for follow-up ear exam.  On prior visits he had a slight amount of inflammation/granulation at the periphery of his left TM. He has been using topical therapy. There is been slow resolution but there is still some persistent hyperemia at the TM. I do not think this represents any type of significant vascular lesion. At this point I will discontinue topical therapy. He will be seen in follow-up in a few months  His presentation is otherwise unchanged. There has been no drainage. He has no ear discomfort. There is no change in his hearing.   Right ear exam is entirely normal

## 2022-04-21 ENCOUNTER — TELEPHONE (OUTPATIENT)
Dept: UROLOGY | Facility: CLINIC | Age: 72
End: 2022-04-21

## 2022-04-21 NOTE — TELEPHONE ENCOUNTER
Caller: JUVENAL SALAZAR    Relationship: SELF    Best call back number: [phone]    What orders are you requesting (i.e. lab or imaging): UPDATED ORDERS FOR PSA AND RENAL U/S    In what timeframe would the patient need to come in: PRIOR TO ANNUAL FOLLOW UP 22    Where will you receive your lab/imaging services: [facility] DR [facility] OFF[facility]    Additional notes: PT SCHEDULED ANNUAL FOLLOW UP FROM RECALL LETTER FOR 22 W/PSA LABS ON 22. PT WILL NEED TO SCHEDULE RENAL U/S AND WILL NEED UPDATED ORDERS SINCE CURRENT ONES WILL . PLEASE CALL AND LET PT KNOW WHEN UPDATED SO HE CAN SCHEDULE RENAL U/S.

## 2022-05-17 DIAGNOSIS — N28.1 RENAL CYST: Primary | ICD-10-CM

## 2022-05-25 ENCOUNTER — APPOINTMENT (OUTPATIENT)
Dept: CT IMAGING | Facility: HOSPITAL | Age: 72
End: 2022-05-25

## 2022-05-25 ENCOUNTER — HOSPITAL ENCOUNTER (EMERGENCY)
Facility: HOSPITAL | Age: 72
Discharge: HOME | End: 2022-05-25
Attending: FAMILY MEDICINE | Admitting: FAMILY MEDICINE

## 2022-05-25 ENCOUNTER — TELEPHONE (OUTPATIENT)
Dept: INTERNAL MEDICINE | Facility: CLINIC | Age: 72
End: 2022-05-25

## 2022-05-25 VITALS
WEIGHT: 210 LBS | SYSTOLIC BLOOD PRESSURE: 133 MMHG | HEART RATE: 73 BPM | HEIGHT: 72 IN | TEMPERATURE: 97.9 F | OXYGEN SATURATION: 100 % | RESPIRATION RATE: 18 BRPM | DIASTOLIC BLOOD PRESSURE: 75 MMHG | BODY MASS INDEX: 28.44 KG/M2

## 2022-05-25 DIAGNOSIS — S09.90XA TRAUMATIC INJURY OF HEAD, INITIAL ENCOUNTER: ICD-10-CM

## 2022-05-25 DIAGNOSIS — G25.2 COARSE TREMORS: Primary | ICD-10-CM

## 2022-05-25 LAB
ALBUMIN SERPL-MCNC: 4.1 G/DL (ref 3.5–5.2)
ALBUMIN/GLOB SERPL: 1.5 G/DL
ALP SERPL-CCNC: 95 U/L (ref 39–117)
ALT SERPL W P-5'-P-CCNC: 16 U/L (ref 1–41)
ANION GAP SERPL CALCULATED.3IONS-SCNC: 10 MMOL/L (ref 5–15)
AST SERPL-CCNC: 22 U/L (ref 1–40)
BASOPHILS # BLD AUTO: 0.08 10*3/MM3 (ref 0–0.2)
BASOPHILS NFR BLD AUTO: 1 % (ref 0–1.5)
BILIRUB SERPL-MCNC: 0.4 MG/DL (ref 0–1.2)
BUN SERPL-MCNC: 17 MG/DL (ref 8–23)
BUN/CREAT SERPL: 15.5 (ref 7–25)
CALCIUM SPEC-SCNC: 9.1 MG/DL (ref 8.6–10.5)
CHLORIDE SERPL-SCNC: 104 MMOL/L (ref 98–107)
CO2 SERPL-SCNC: 27 MMOL/L (ref 22–29)
CREAT SERPL-MCNC: 1.1 MG/DL (ref 0.76–1.27)
DEPRECATED RDW RBC AUTO: 42.9 FL (ref 37–54)
EGFRCR SERPLBLD CKD-EPI 2021: 72.2 ML/MIN/1.73
EOSINOPHIL # BLD AUTO: 0.2 10*3/MM3 (ref 0–0.4)
EOSINOPHIL NFR BLD AUTO: 2.6 % (ref 0.3–6.2)
ERYTHROCYTE [DISTWIDTH] IN BLOOD BY AUTOMATED COUNT: 12.1 % (ref 12.3–15.4)
GLOBULIN UR ELPH-MCNC: 2.7 GM/DL
GLUCOSE SERPL-MCNC: 92 MG/DL (ref 65–99)
HCT VFR BLD AUTO: 42 % (ref 37.5–51)
HGB BLD-MCNC: 13.4 G/DL (ref 13–17.7)
IMM GRANULOCYTES # BLD AUTO: 0.02 10*3/MM3 (ref 0–0.05)
IMM GRANULOCYTES NFR BLD AUTO: 0.3 % (ref 0–0.5)
LYMPHOCYTES # BLD AUTO: 2.67 10*3/MM3 (ref 0.7–3.1)
LYMPHOCYTES NFR BLD AUTO: 34.7 % (ref 19.6–45.3)
MAGNESIUM SERPL-MCNC: 2.2 MG/DL (ref 1.6–2.4)
MCH RBC QN AUTO: 30.5 PG (ref 26.6–33)
MCHC RBC AUTO-ENTMCNC: 31.9 G/DL (ref 31.5–35.7)
MCV RBC AUTO: 95.7 FL (ref 79–97)
MONOCYTES # BLD AUTO: 0.72 10*3/MM3 (ref 0.1–0.9)
MONOCYTES NFR BLD AUTO: 9.4 % (ref 5–12)
NEUTROPHILS NFR BLD AUTO: 4 10*3/MM3 (ref 1.7–7)
NEUTROPHILS NFR BLD AUTO: 52 % (ref 42.7–76)
NRBC BLD AUTO-RTO: 0 /100 WBC (ref 0–0.2)
PLATELET # BLD AUTO: 266 10*3/MM3 (ref 140–450)
PMV BLD AUTO: 10.1 FL (ref 6–12)
POTASSIUM SERPL-SCNC: 4.5 MMOL/L (ref 3.5–5.2)
PROT SERPL-MCNC: 6.8 G/DL (ref 6–8.5)
RBC # BLD AUTO: 4.39 10*6/MM3 (ref 4.14–5.8)
SODIUM SERPL-SCNC: 141 MMOL/L (ref 136–145)
T4 FREE SERPL-MCNC: 0.93 NG/DL (ref 0.93–1.7)
TROPONIN T SERPL-MCNC: <0.01 NG/ML (ref 0–0.03)
TSH SERPL DL<=0.05 MIU/L-ACNC: 2.07 UIU/ML (ref 0.27–4.2)
WBC NRBC COR # BLD: 7.69 10*3/MM3 (ref 3.4–10.8)

## 2022-05-25 PROCEDURE — 85025 COMPLETE CBC W/AUTO DIFF WBC: CPT | Performed by: FAMILY MEDICINE

## 2022-05-25 PROCEDURE — 84439 ASSAY OF FREE THYROXINE: CPT | Performed by: FAMILY MEDICINE

## 2022-05-25 PROCEDURE — 80053 COMPREHEN METABOLIC PANEL: CPT | Performed by: FAMILY MEDICINE

## 2022-05-25 PROCEDURE — 84484 ASSAY OF TROPONIN QUANT: CPT | Performed by: FAMILY MEDICINE

## 2022-05-25 PROCEDURE — 70450 CT HEAD/BRAIN W/O DYE: CPT

## 2022-05-25 PROCEDURE — 83735 ASSAY OF MAGNESIUM: CPT | Performed by: FAMILY MEDICINE

## 2022-05-25 PROCEDURE — 84443 ASSAY THYROID STIM HORMONE: CPT | Performed by: FAMILY MEDICINE

## 2022-05-25 PROCEDURE — 99283 EMERGENCY DEPT VISIT LOW MDM: CPT
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
## 2022-05-25 NOTE — ED NOTES
Pt c/o an episode last night where he was shaky and clammy. Denies these s/s at this time. Pt also c/o fall on May 2 where he hit his head on concrete. Pt c/o soreness to the back of his head. Pt lso c/o intermittent HTN. Denies hx of HTN.

## 2022-06-08 DIAGNOSIS — E78.2 MIXED HYPERLIPIDEMIA: ICD-10-CM

## 2022-06-08 DIAGNOSIS — Z86.73 HISTORY OF TIA (TRANSIENT ISCHEMIC ATTACK): ICD-10-CM

## 2022-06-08 RX ORDER — ATORVASTATIN CALCIUM 10 MG/1
10 TABLET, FILM COATED ORAL DAILY
Qty: 90 TABLET | Refills: 3 | Status: SHIPPED | OUTPATIENT
Start: 2022-06-08

## 2022-07-05 ENCOUNTER — OFFICE VISIT (OUTPATIENT)
Dept: ENT CLINIC | Age: 72
End: 2022-07-05
Payer: MEDICARE

## 2022-07-05 VITALS
SYSTOLIC BLOOD PRESSURE: 126 MMHG | DIASTOLIC BLOOD PRESSURE: 86 MMHG | BODY MASS INDEX: 28.99 KG/M2 | HEIGHT: 72 IN | WEIGHT: 214 LBS

## 2022-07-05 DIAGNOSIS — H73.12 MYRINGITIS, CHRONIC, LEFT: Primary | ICD-10-CM

## 2022-07-05 PROCEDURE — 99213 OFFICE O/P EST LOW 20 MIN: CPT | Performed by: OTOLARYNGOLOGY

## 2022-07-05 PROCEDURE — 3017F COLORECTAL CA SCREEN DOC REV: CPT | Performed by: OTOLARYNGOLOGY

## 2022-07-05 PROCEDURE — G8417 CALC BMI ABV UP PARAM F/U: HCPCS | Performed by: OTOLARYNGOLOGY

## 2022-07-05 PROCEDURE — 1123F ACP DISCUSS/DSCN MKR DOCD: CPT | Performed by: OTOLARYNGOLOGY

## 2022-07-05 PROCEDURE — 1036F TOBACCO NON-USER: CPT | Performed by: OTOLARYNGOLOGY

## 2022-07-05 PROCEDURE — G8427 DOCREV CUR MEDS BY ELIG CLIN: HCPCS | Performed by: OTOLARYNGOLOGY

## 2022-07-05 ASSESSMENT — ENCOUNTER SYMPTOMS
ALLERGIC/IMMUNOLOGIC NEGATIVE: 1
EYES NEGATIVE: 1
GASTROINTESTINAL NEGATIVE: 1
RESPIRATORY NEGATIVE: 1

## 2022-07-05 NOTE — PROGRESS NOTES
2022    Marques Granados (:  1950) is a 80 y.o. male, Established patient, here for evaluation of the following chief complaint(s):  New Patient (3 month ear f/u)      Vitals:    22 1109   BP: 126/86   Weight: 214 lb (97.1 kg)   Height: 6' (1.829 m)       Wt Readings from Last 3 Encounters:   22 214 lb (97.1 kg)   22 212 lb 12.8 oz (96.5 kg)   03/10/22 208 lb (94.3 kg)       BP Readings from Last 3 Encounters:   22 126/86   22 120/64   07/15/21 122/66         SUBJECTIVE/OBJECTIVE:    Patient seen today for his left ear. He been treated in the past for myringitis on the left and placed on drops. Currently has no pain on that side and no hearing loss on that side. Review of Systems   Constitutional: Negative. HENT: Negative. Eyes: Negative. Respiratory: Negative. Cardiovascular: Negative. Gastrointestinal: Negative. Endocrine: Negative. Musculoskeletal: Negative. Skin: Negative. Allergic/Immunologic: Negative. Neurological: Negative. Hematological: Negative. Psychiatric/Behavioral: Negative. Physical Exam  Vitals reviewed. Constitutional:       Appearance: Normal appearance. He is normal weight. HENT:      Head: Normocephalic and atraumatic. Right Ear: Tympanic membrane, ear canal and external ear normal.      Left Ear: Tympanic membrane, ear canal and external ear normal.      Nose: Nose normal.      Mouth/Throat:      Mouth: Mucous membranes are moist.      Pharynx: Oropharynx is clear. Eyes:      Extraocular Movements: Extraocular movements intact. Pupils: Pupils are equal, round, and reactive to light. Cardiovascular:      Rate and Rhythm: Normal rate and regular rhythm. Pulmonary:      Effort: Pulmonary effort is normal.      Breath sounds: Normal breath sounds. Musculoskeletal:      Cervical back: Normal range of motion. Skin:     General: Skin is warm and dry.    Neurological:      General: No focal deficit present. Mental Status: He is alert and oriented to person, place, and time. Psychiatric:         Mood and Affect: Mood normal.         Behavior: Behavior normal.              ASSESSMENT/PLAN:    1. Myringitis, chronic, left  Small red area posteriorly on his TM but nothing concerning. Like to see him back in 6 months to make sure this is not changing. Return in about 6 months (around 1/5/2023) for Ear check. An electronic signature was used to authenticate this note. Philipp Olvera MD       Please note that this chart was generated using dragon dictation software. Although every effort was made to ensure the accuracy of this automated transcription, some errors in transcription may have occurred.

## 2022-07-06 ENCOUNTER — HOSPITAL ENCOUNTER (OUTPATIENT)
Dept: ULTRASOUND IMAGING | Facility: HOSPITAL | Age: 72
Discharge: HOME | End: 2022-07-06
Admitting: UROLOGY

## 2022-07-06 DIAGNOSIS — N28.1 RENAL CYST: ICD-10-CM

## 2022-07-06 PROCEDURE — 76775 US EXAM ABDO BACK WALL LIM: CPT
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
## 2022-07-11 ENCOUNTER — OFFICE VISIT (OUTPATIENT)
Dept: UROLOGY | Facility: CLINIC | Age: 72
End: 2022-07-11

## 2022-07-11 VITALS — BODY MASS INDEX: 28.53 KG/M2 | HEIGHT: 72 IN | WEIGHT: 210.6 LBS

## 2022-07-11 DIAGNOSIS — N40.1 BPH WITH URINARY OBSTRUCTION: ICD-10-CM

## 2022-07-11 DIAGNOSIS — N20.0 NEPHROLITHIASIS: ICD-10-CM

## 2022-07-11 DIAGNOSIS — N28.1 RENAL CYSTS, ACQUIRED, BILATERAL: Primary | ICD-10-CM

## 2022-07-11 DIAGNOSIS — N13.8 BPH WITH URINARY OBSTRUCTION: ICD-10-CM

## 2022-07-11 PROCEDURE — 99214 OFFICE O/P EST MOD 30 MIN: CPT | Performed by: UROLOGY

## 2022-08-26 NOTE — PATIENT INSTRUCTIONS
Medicare Wellness  Personal Prevention Plan of Service     Date of Office Visit:  10/06/2021  Encounter Provider:  CECELIA Israel  Place of Service:  [facility] INTERNAL MEDICINE  Patient Name: Rasheed Arevalo  :  1950    As part of the Medicare Wellness portion of your visit today, we are providing you with this personalized preventive plan of services (PPPS). This plan is based upon recommendations of the [place] Preventive Services Task Force (USPSTF) and the Advisory Committee on Immunization Practices (ACIP).    This lists the preventive care services that should be considered, and provides dates of when you are due. Items listed as completed are up-to-date and do not require any further intervention.    Health Maintenance   Topic Date Due   • LIPID PANEL  2021   • ANNUAL WELLNESS VISIT  10/06/2022   • COLORECTAL CANCER SCREENING  2025   • TDAP/TD VACCINES (2 - Td or Tdap) 2027   • HEPATITIS C SCREENING  Completed   • COVID-19 Vaccine  Completed   • INFLUENZA VACCINE  Completed   • Pneumococcal Vaccine 65+  Completed   • AAA SCREEN (ONE-TIME)  Completed   • ZOSTER VACCINE  Completed       Orders Placed This Encounter   Procedures   • Fluzone High-Dose 65+yrs ([id number])   • Lipid panel     Standing Status:   Future     Standing Expiration Date:   10/6/2022     Order Specific Question:   Release to patient     Answer:   Immediate   • Comprehensive metabolic panel     Standing Status:   Future     Standing Expiration Date:   10/6/2022     Order Specific Question:   Release to patient     Answer:   Immediate   • CBC No Differential     Standing Status:   Future     Standing Expiration Date:   10/6/2022     Order Specific Question:   Release to patient     Answer:   Immediate       Return if symptoms worsen or fail to improve, for Next scheduled follow up.        
[Negative] : Heme/Lymph

## 2022-09-21 DIAGNOSIS — E78.2 MIXED HYPERLIPIDEMIA: ICD-10-CM

## 2022-09-21 DIAGNOSIS — Z86.73 HISTORY OF TIA (TRANSIENT ISCHEMIC ATTACK): ICD-10-CM

## 2022-09-21 RX ORDER — ATORVASTATIN CALCIUM 10 MG/1
10 TABLET, FILM COATED ORAL DAILY
Qty: 90 TABLET | Refills: 3 | OUTPATIENT
Start: 2022-09-21

## 2022-09-21 NOTE — TELEPHONE ENCOUNTER
Caller: Rasheed Arevalo    Relationship: Self    Best call back number: [phone]    Requested Prescriptions:   Requested Prescriptions     Pending Prescriptions Disp Refills   • atorvastatin (LIPITOR) 10 MG tablet 90 tablet 3     Sig: Take 1 tablet by mouth Daily.        Pharmacy where request should be sent: [facility] [address] [facility] [phone] PH - [phone]      Additional details provided by patient: NEEDS A 90 DAY SUPPLY     Does the patient have less than a 3 day supply:  [x] Yes  [] No    Joseph Paz Rep   09/21/22 14:56 CDT

## 2022-10-14 ENCOUNTER — OFFICE VISIT (OUTPATIENT)
Dept: INTERNAL MEDICINE | Facility: CLINIC | Age: 72
End: 2022-10-14

## 2022-10-14 VITALS
DIASTOLIC BLOOD PRESSURE: 78 MMHG | HEIGHT: 72 IN | SYSTOLIC BLOOD PRESSURE: 128 MMHG | WEIGHT: 208.2 LBS | BODY MASS INDEX: 28.2 KG/M2 | RESPIRATION RATE: 16 BRPM | HEART RATE: 70 BPM | TEMPERATURE: 97.8 F | OXYGEN SATURATION: 98 %

## 2022-10-14 DIAGNOSIS — M54.2 CHRONIC NECK AND BACK PAIN: Primary | ICD-10-CM

## 2022-10-14 DIAGNOSIS — Z99.89 OSA ON CPAP: ICD-10-CM

## 2022-10-14 DIAGNOSIS — M54.9 CHRONIC NECK AND BACK PAIN: Primary | ICD-10-CM

## 2022-10-14 DIAGNOSIS — Z23 NEED FOR VACCINATION: ICD-10-CM

## 2022-10-14 DIAGNOSIS — G89.29 CHRONIC NECK AND BACK PAIN: Primary | ICD-10-CM

## 2022-10-14 DIAGNOSIS — E66.3 OVERWEIGHT (BMI 25.0-29.9): ICD-10-CM

## 2022-10-14 DIAGNOSIS — Z86.73 HISTORY OF TIA (TRANSIENT ISCHEMIC ATTACK): ICD-10-CM

## 2022-10-14 DIAGNOSIS — E78.2 MIXED HYPERLIPIDEMIA: ICD-10-CM

## 2022-10-14 DIAGNOSIS — G47.33 OSA ON CPAP: ICD-10-CM

## 2022-10-14 PROCEDURE — 96160 PT-FOCUSED HLTH RISK ASSMT: CPT | Performed by: INTERNAL MEDICINE

## 2022-10-14 PROCEDURE — 90662 IIV NO PRSV INCREASED AG IM: CPT | Performed by: INTERNAL MEDICINE

## 2022-10-14 PROCEDURE — G0008 ADMIN INFLUENZA VIRUS VAC: HCPCS | Performed by: INTERNAL MEDICINE

## 2022-10-14 PROCEDURE — G0439 PPPS, SUBSEQ VISIT: HCPCS | Performed by: INTERNAL MEDICINE

## 2022-10-14 PROCEDURE — 1170F FXNL STATUS ASSESSED: CPT | Performed by: INTERNAL MEDICINE

## 2022-10-14 PROCEDURE — 1159F MED LIST DOCD IN RCRD: CPT | Performed by: INTERNAL MEDICINE

## 2022-10-14 NOTE — PROGRESS NOTES
The ABCs of the Annual Wellness Visit  Subsequent Medicare Wellness Visit    Chief Complaint   Patient presents with   • Medicare Wellness-subsequent      Subjective    History of Present Illness:  Rasheed Arevalo is a 72 y.o. male who presents for a Subsequent Medicare Wellness Visit.    The following portions of the patient's history were reviewed and   updated as appropriate: allergies, current medications, past family history, past medical history, past social history, past surgical history and problem list.    Compared to one year ago, the patient feels his physical   health is the same.    Compared to one year ago, the patient feels his mental   health is the same.    Recent Hospitalizations:  He was not admitted to the hospital during the last year.       Current Medical Providers:  Patient Care Team:  Wild Alatorre DO as PCP - General (Internal Medicine)  Shaye Acevedo APRN (Inactive) as Nurse Practitioner (Otolaryngology)  Gavin Kitchen MD as Consulting Physician (Otolaryngology)    Outpatient Medications Prior to Visit   Medication Sig Dispense Refill   • atorvastatin (LIPITOR) 10 MG tablet Take 1 tablet by mouth Daily. 90 tablet 3   • fluticasone (FLONASE) 50 MCG/ACT nasal spray 2 sprays into the nostril(s) as directed by provider Daily.       No facility-administered medications prior to visit.       No opioid medication identified on active medication list. I have reviewed chart for other potential  high risk medication/s and harmful drug interactions in the elderly.          Aspirin is not on active medication list.  Aspirin use is not indicated based on review of current medical condition/s. Risk of harm outweighs potential benefits.  .    Patient Active Problem List   Diagnosis   • Ataxia   • Mixed hyperlipidemia   • History of TIA (transient ischemic attack)   • Renal cysts, acquired, bilateral   • Benign non-nodular prostatic hyperplasia without lower urinary tract symptoms   •  "Chronic non-seasonal allergic rhinitis   • History of kidney stones   • Overweight (BMI 25.0-29.9)   • Supraventricular tachycardia (HCC)   • NAVNEET on CPAP   • Bilateral sensorineural hearing loss   • Chronic neck and back pain     Advance Care Planning  Advance Directive is on file.  ACP discussion was held with the patient during this visit. Patient has an advance directive in EMR which is still valid.     Review of Systems   Respiratory: Negative for shortness of breath.    Cardiovascular: Negative for chest pain.        Objective    Vitals:    10/14/22 1110   BP: 128/78   BP Location: Left arm   Patient Position: Sitting   Cuff Size: Adult   Pulse: 70   Resp: 16   Temp: 97.8 °F (36.6 °C)   SpO2: 98%   Weight: 94.4 kg (208 lb 3.2 oz)   Height: 182.9 cm (72\")     Estimated body mass index is 28.24 kg/m² as calculated from the following:    Height as of this encounter: 182.9 cm (72\").    Weight as of this encounter: 94.4 kg (208 lb 3.2 oz).    BMI is >= 25 and <30. (Overweight) The following options were offered after discussion;: exercise counseling/recommendations and nutrition counseling/recommendations      Does the patient have evidence of cognitive impairment? No    Physical Exam  Constitutional:       General: He is not in acute distress.  Cardiovascular:      Rate and Rhythm: Normal rate and regular rhythm.      Heart sounds: Normal heart sounds. No murmur heard.  Pulmonary:      Effort: Pulmonary effort is normal.      Breath sounds: Normal breath sounds. No wheezing.   Neurological:      Mental Status: He is alert and oriented to person, place, and time.      Gait: Gait normal.   Psychiatric:         Mood and Affect: Mood normal.         Behavior: Behavior normal.                 HEALTH RISK ASSESSMENT    Smoking Status:  Social History     Tobacco Use   Smoking Status Former   • Packs/day: 1.00   • Years: 6.00   • Pack years: 6.00   • Types: Cigarettes   • Start date: 7/1/1966   • Quit date: 1/1/1970   • " Years since quittin.8   Smokeless Tobacco Never   Tobacco Comments    Smoked cigarettes when young.  Quit in college.  Never since.     Alcohol Consumption:  Social History     Substance and Sexual Activity   Alcohol Use No     Fall Risk Screen:    MARILEE Fall Risk Assessment was completed, and patient is at HIGH risk for falls. Assessment completed on:10/14/2022    Depression Screening:  PHQ-2/PHQ-9 Depression Screening 10/14/2022   Retired PHQ-9 Total Score -   Retired Total Score -   Little Interest or Pleasure in Doing Things 0-->not at all   Feeling Down, Depressed or Hopeless 0-->not at all   PHQ-9: Brief Depression Severity Measure Score 0       Health Habits and Functional and Cognitive Screening:  Functional & Cognitive Status 10/14/2022   Do you have difficulty preparing food and eating? No   Do you have difficulty bathing yourself, getting dressed or grooming yourself? No   Do you have difficulty using the toilet? No   Do you have difficulty moving around from place to place? No   Do you have trouble with steps or getting out of a bed or a chair? No   Current Diet Unhealthy Diet   Dental Exam Up to date   Eye Exam Up to date   Exercise (times per week) 0 times per week   Current Exercises Include No Regular Exercise   Current Exercise Activities Include -   Do you need help using the phone?  No   Are you deaf or do you have serious difficulty hearing?  No   Do you need help with transportation? No   Do you need help shopping? No   Do you need help preparing meals?  No   Do you need help with housework?  No   Do you need help with laundry? No   Do you need help taking your medications? No   Do you need help managing money? No   Do you ever drive or ride in a car without wearing a seat belt? No   Have you felt unusual stress, anger or loneliness in the last month? No   Who do you live with? Spouse   If you need help, do you have trouble finding someone available to you? No   Have you been bothered in  the last four weeks by sexual problems? No   Do you have difficulty concentrating, remembering or making decisions? No       Age-appropriate Screening Schedule:  Refer to the list below for future screening recommendations based on patient's age, sex and/or medical conditions. Orders for these recommended tests are listed in the plan section. The patient has been provided with a written plan.    Health Maintenance   Topic Date Due   • LIPID PANEL  01/07/2023   • TDAP/TD VACCINES (2 - Td or Tdap) 08/16/2027   • INFLUENZA VACCINE  Completed   • ZOSTER VACCINE  Completed              Assessment & Plan   CMS Preventative Services Quick Reference  Risk Factors Identified During Encounter  Cardiovascular Disease  Immunizations Discussed/Encouraged (specific Immunizations; Influenza and COVID19  Obesity/Overweight   The above risks/problems have been discussed with the patient.  Follow up actions/plans if indicated are seen below in the Assessment/Plan Section.  Pertinent information has been shared with the patient in the After Visit Summary.    Diagnoses and all orders for this visit:    1. Chronic neck and back pain (Primary)    2. History of TIA (transient ischemic attack)    3. NAVNEET on CPAP    4. Mixed hyperlipidemia    5. Overweight (BMI 25.0-29.9)    6. Need for vaccination  -     Fluzone High-Dose 65+yrs ([id number])        Follow Up:   Return in about 1 year (around 10/14/2023) for Medicare Wellness.     An After Visit Summary and PPPS were made available to the patient.

## 2022-10-14 NOTE — PATIENT INSTRUCTIONS
Medicare Wellness  Personal Prevention Plan of Service     Date of Office Visit:    Encounter Provider:  Wild Alatorre DO  Place of Service:  [facility] INTERNAL MEDICINE  Patient Name: Rasheed Arevalo  :  1950    As part of the Medicare Wellness portion of your visit today, we are providing you with this personalized preventive plan of services (PPPS). This plan is based upon recommendations of the [place] Preventive Services Task Force (USPSTF) and the Advisory Committee on Immunization Practices (ACIP).    This lists the preventive care services that should be considered, and provides dates of when you are due. Items listed as completed are up-to-date and do not require any further intervention.    Health Maintenance   Topic Date Due    COVID-19 Vaccine (5 - Booster for Moderna series) 2022    INFLUENZA VACCINE  2022    LIPID PANEL  2023    ANNUAL WELLNESS VISIT  10/14/2023    COLORECTAL CANCER SCREENING  2025    TDAP/TD VACCINES (2 - Td or Tdap) 2027    HEPATITIS C SCREENING  Completed    Pneumococcal Vaccine 65+  Completed    AAA SCREEN (ONE-TIME)  Completed    ZOSTER VACCINE  Completed       Orders Placed This Encounter   Procedures    Fluzone High-Dose 65+yrs ([id number])       Return in about 1 year (around 10/14/2023) for Medicare Wellness.

## 2022-10-23 NOTE — PLAN OF CARE
Problem: Patient Care Overview  Goal: Plan of Care Review  Outcome: Ongoing (interventions implemented as appropriate)   02/01/19 1115   Coping/Psychosocial   Plan of Care Reviewed With patient;spouse;son   OTHER   Outcome Summary PT eval completed. He was independent with all bed mobility, t/f, gait and stair training. B LE MMT strength 5/5 with no coordination or balance deficits at this time. He reports that he is at his baseline mobility and he has no further PT needs, questions, or concerns. PT to sign off, shashi d/c home with spouse/family.           Negative

## 2022-11-28 ENCOUNTER — TELEPHONE (OUTPATIENT)
Dept: ENT CLINIC | Age: 72
End: 2022-11-28

## 2022-11-28 NOTE — TELEPHONE ENCOUNTER
Pt called stating he had an infection in his left ear again and would like some of the antibiotic drops he has used in the past [facility]

## 2023-01-05 ENCOUNTER — OFFICE VISIT (OUTPATIENT)
Dept: ENT CLINIC | Age: 73
End: 2023-01-05
Payer: MEDICARE

## 2023-01-05 VITALS
DIASTOLIC BLOOD PRESSURE: 70 MMHG | WEIGHT: 214 LBS | BODY MASS INDEX: 28.99 KG/M2 | SYSTOLIC BLOOD PRESSURE: 124 MMHG | HEIGHT: 72 IN

## 2023-01-05 DIAGNOSIS — H60.92 RECURRENT OTITIS EXTERNA OF LEFT EAR: Primary | ICD-10-CM

## 2023-01-05 PROCEDURE — 4130F TOPICAL PREP RX AOE: CPT | Performed by: OTOLARYNGOLOGY

## 2023-01-05 PROCEDURE — G8484 FLU IMMUNIZE NO ADMIN: HCPCS | Performed by: OTOLARYNGOLOGY

## 2023-01-05 PROCEDURE — 1123F ACP DISCUSS/DSCN MKR DOCD: CPT | Performed by: OTOLARYNGOLOGY

## 2023-01-05 PROCEDURE — 3017F COLORECTAL CA SCREEN DOC REV: CPT | Performed by: OTOLARYNGOLOGY

## 2023-01-05 PROCEDURE — 1036F TOBACCO NON-USER: CPT | Performed by: OTOLARYNGOLOGY

## 2023-01-05 PROCEDURE — G8417 CALC BMI ABV UP PARAM F/U: HCPCS | Performed by: OTOLARYNGOLOGY

## 2023-01-05 PROCEDURE — G8427 DOCREV CUR MEDS BY ELIG CLIN: HCPCS | Performed by: OTOLARYNGOLOGY

## 2023-01-05 PROCEDURE — 99213 OFFICE O/P EST LOW 20 MIN: CPT | Performed by: OTOLARYNGOLOGY

## 2023-01-05 ASSESSMENT — ENCOUNTER SYMPTOMS
RESPIRATORY NEGATIVE: 1
ALLERGIC/IMMUNOLOGIC NEGATIVE: 1
EYES NEGATIVE: 1
GASTROINTESTINAL NEGATIVE: 1

## 2023-01-05 NOTE — PROGRESS NOTES
2023    Gay Drew (:  1950) is a 71 y.o. male, Established patient, here for evaluation of the following chief complaint(s):  Follow-up (Left ear)      Vitals:    23 1037   BP: 124/70   Weight: 214 lb (97.1 kg)   Height: 6' (1.829 m)       Wt Readings from Last 3 Encounters:   23 214 lb (97.1 kg)   22 214 lb (97.1 kg)   22 212 lb 12.8 oz (96.5 kg)       BP Readings from Last 3 Encounters:   23 124/70   22 126/86   22 120/64         SUBJECTIVE/OBJECTIVE:    Patient seen today for his left ear. Patient says he had an infection when swimming recently noted on drops not help. Currently she is hearing fine with no pain is ears. Review of Systems   Constitutional: Negative. HENT: Negative. Eyes: Negative. Respiratory: Negative. Cardiovascular: Negative. Gastrointestinal: Negative. Endocrine: Negative. Musculoskeletal: Negative. Skin: Negative. Allergic/Immunologic: Negative. Neurological: Negative. Hematological: Negative. Psychiatric/Behavioral: Negative. Physical Exam  Vitals reviewed. Constitutional:       Appearance: Normal appearance. He is normal weight. HENT:      Head: Normocephalic and atraumatic. Right Ear: Tympanic membrane, ear canal and external ear normal.      Left Ear: Tympanic membrane, ear canal and external ear normal.      Nose: Nose normal.      Mouth/Throat:      Mouth: Mucous membranes are moist.      Pharynx: Oropharynx is clear. Eyes:      Extraocular Movements: Extraocular movements intact. Pupils: Pupils are equal, round, and reactive to light. Cardiovascular:      Rate and Rhythm: Normal rate and regular rhythm. Pulmonary:      Effort: Pulmonary effort is normal.      Breath sounds: Normal breath sounds. Musculoskeletal:      Cervical back: Normal range of motion. Skin:     General: Skin is warm and dry.    Neurological:      General: No focal deficit present. Mental Status: He is alert and oriented to person, place, and time. Psychiatric:         Mood and Affect: Mood normal.         Behavior: Behavior normal.            ASSESSMENT/PLAN:    1. Recurrent otitis externa of left ear  No signs of infection today. Some erythema of the TM on the left but that is been persistent. Call for drops and follow-up as needed. Return if symptoms worsen or fail to improve. An electronic signature was used to authenticate this note. Mateo Reed MD       Please note that this chart was generated using dragon dictation software. Although every effort was made to ensure the accuracy of this automated transcription, some errors in transcription may have occurred.

## 2023-05-11 ENCOUNTER — TELEPHONE (OUTPATIENT)
Dept: INTERNAL MEDICINE | Facility: CLINIC | Age: 73
End: 2023-05-11

## 2023-05-11 NOTE — TELEPHONE ENCOUNTER
PATIENT HAS BEEN CALLED, HE STATED THAT HE WAS CLEANING UP SOME BRUSH AND GO INTO SOME POISON IVY.    HE STATED THAT IT IS IN THE BEND OF HIS RIGHT ARM, FINGERS OF LEFT HAND,  FOLDS OF LEFT KNEE, CROTCH AND ON HIS FACE BY HIS EYE.   PATIENT IS ASKING FOR SOMETHING TO HELP DRY IT UP-.

## 2023-05-11 NOTE — TELEPHONE ENCOUNTER
"    Caller: Rasheed Arevalo \"Efrain\"    Relationship: Self    Best call back number: [phone]    What medication are you requesting:   PCP RECOMMENDATION     What are your current symptoms:   POISON IVY SPREADING AND WILL BE LEAVING IN A COUPLE DAYS     How long have you been experiencing symptoms:   4 DAYS     Have you had these symptoms before:    [x] Yes  [] No    Have you been treated for these symptoms before:   [x] Yes  [] No    If a prescription is needed, what is your preferred pharmacy and phone number: [place], [place] - [address] [facility] [phone] [facility] [phone] FX     Additional notes:  PLEASE CONTACT PATIENT AND ADVISE OF Rx SENT TO PHARMACY.       "

## 2023-05-11 NOTE — TELEPHONE ENCOUNTER
Calamine lotion is recommended to help dry it. No therapy works to make it go away any sooner, it just has to run its course. For wide distribution, we can sometimes consider steroids, but would recommend a visit for evaluation if needing to consider that.

## 2023-08-24 ENCOUNTER — PRE-ADMISSION TESTING (OUTPATIENT)
Dept: PREADMISSION TESTING | Facility: HOSPITAL | Age: 73
End: 2023-08-24
Payer: MEDICARE

## 2023-08-24 VITALS
DIASTOLIC BLOOD PRESSURE: 72 MMHG | RESPIRATION RATE: 18 BRPM | WEIGHT: 220.9 LBS | HEIGHT: 71 IN | OXYGEN SATURATION: 98 % | HEART RATE: 87 BPM | SYSTOLIC BLOOD PRESSURE: 112 MMHG | BODY MASS INDEX: 30.93 KG/M2

## 2023-08-24 LAB
ANION GAP SERPL CALCULATED.3IONS-SCNC: 9 MMOL/L (ref 5–15)
BUN SERPL-MCNC: 21 MG/DL (ref 8–23)
BUN/CREAT SERPL: 17.9 (ref 7–25)
CALCIUM SPEC-SCNC: 9 MG/DL (ref 8.6–10.5)
CHLORIDE SERPL-SCNC: 106 MMOL/L (ref 98–107)
CO2 SERPL-SCNC: 26 MMOL/L (ref 22–29)
CREAT SERPL-MCNC: 1.17 MG/DL (ref 0.76–1.27)
DEPRECATED RDW RBC AUTO: 42.9 FL (ref 37–54)
EGFRCR SERPLBLD CKD-EPI 2021: 66.2 ML/MIN/1.73
ERYTHROCYTE [DISTWIDTH] IN BLOOD BY AUTOMATED COUNT: 12.3 % (ref 12.3–15.4)
GLUCOSE SERPL-MCNC: 90 MG/DL (ref 65–99)
HCT VFR BLD AUTO: 40.9 % (ref 37.5–51)
HGB BLD-MCNC: 13 G/DL (ref 13–17.7)
MCH RBC QN AUTO: 30 PG (ref 26.6–33)
MCHC RBC AUTO-ENTMCNC: 31.8 G/DL (ref 31.5–35.7)
MCV RBC AUTO: 94.5 FL (ref 79–97)
PLATELET # BLD AUTO: 255 10*3/MM3 (ref 140–450)
PMV BLD AUTO: 10.8 FL (ref 6–12)
POTASSIUM SERPL-SCNC: 4.3 MMOL/L (ref 3.5–5.2)
RBC # BLD AUTO: 4.33 10*6/MM3 (ref 4.14–5.8)
SODIUM SERPL-SCNC: 141 MMOL/L (ref 136–145)
WBC NRBC COR # BLD: 6.5 10*3/MM3 (ref 3.4–10.8)

## 2023-08-24 PROCEDURE — 93010 ELECTROCARDIOGRAM REPORT: CPT | Performed by: INTERNAL MEDICINE

## 2023-08-24 PROCEDURE — 93005 ELECTROCARDIOGRAM TRACING: CPT

## 2023-08-24 PROCEDURE — 80048 BASIC METABOLIC PNL TOTAL CA: CPT

## 2023-08-24 PROCEDURE — 85027 COMPLETE CBC AUTOMATED: CPT

## 2023-08-24 PROCEDURE — 36415 COLL VENOUS BLD VENIPUNCTURE: CPT

## 2023-08-24 NOTE — DISCHARGE INSTRUCTIONS
Before you come to the hospital        Arrival time: AS DIRECTED BY OFFICE     YOU MAY TAKE THE FOLLOWING MEDICATION(S) THE MORNING OF SURGERY WITH A SIP OF WATER: AS MD HAS DIRECTED            ALL OTHER HOME MEDICATION CHECK WITH YOUR PHYSICIAN (especially if   you are taking diabetes medicines or blood thinners)    Do not take any Erectile Dysfunction medications (EX: CIALIS, VIAGRA) 24 hours prior to surgery.      If you were given and instructed to use a germ- killing soap, use as directed the night before surgery and again the morning of surgery or as directed by your surgeon. (Use one-half of the bottle with each shower.)   See attached information for How to Use Chlorhexidine for Bathing if applicable.            Eating and drinking restrictions prior to scheduled arrival time    2 Hours before arrival time STOP   Drinking Clear liquids (water, black coffee-NO CREAM,  apple juice-no pulp)      8 Hours before arrival time STOP   All food, full liquids, and dairy products    (It is extremely important that you follow these guidelines to prevent delay or cancelation of your procedure)     Clear Liquids  Water and flavored water                                                                      Clear Fruit juices, such as cranberry juice and apple juice.  Black coffee (NO cream of any kind, including powdered).  Plain tea  Clear bouillon or broth.  Flavored gelatin.  Soda.  Gatorade or Powerade.  Full liquid examples  Juices that have pulp.  Frozen ice pops that contain fruit pieces.  Coffee with creamer  Milk.  Yogurt.                MANAGING PAIN AFTER SURGERY    We know you are probably wondering what your pain will be like after surgery.  Following surgery it is unrealistic to expect you will not have pain.   Pain is how our bodies let us know that something is wrong or cautions us to be careful.  That said, our goal is to make your pain tolerable.    Methods we may use to treat your pain include (oral or  IV medications, PCAs, epidurals, nerve blocks, etc.)   While some procedures require IV pain medications for a short time after surgery, transitioning to pain medications by mouth allows for better management of pain.   Your nurse will encourage you to take oral pain medications whenever possible.  IV medications work almost immediately, but only last a short while.  Taking medications by mouth allows for a more constant level of medication in your blood stream for a longer period of time.      Once your pain is out of control it is harder to get back under control.  It is important you are aware when your next dose of pain medication is due.  If you are admitted, your nurse may write the time of your next dose on the white board in your room to help you remember.      We are interested in your pain and encourage you to inform us about aggravating factors during your visit.   Many times a simple repositioning every few hours can make a big difference.    If your physician says it is okay, do not let your pain prevent you from getting out of bed. Be sure to call your nurse for assistance prior to getting up so you do not fall.      Before surgery, please decide your tolerable pain goal.  These faces help describe the pain ratings we use on a 0-10 scale.   Be prepared to tell us your goal and whether or not you take pain or anxiety medications at home.          Preparing for Surgery  Preparing for surgery is an important part of your care. It can make things go more smoothly and help you avoid complications. The steps leading up to surgery may vary among hospitals. Follow all instructions given to you by your health care providers. Ask questions if you do not understand something. Talk about any concerns that you have.  Here are some questions to consider asking before your surgery:  If my surgery is not an emergency (is elective), when would be the best time to have the surgery?  What arrangements do I need to make  for work, home, or school?  What will my recovery be like? How long will it be before I can return to normal activities?  Will I need to prepare my home? Will I need to arrange care for me or my children?  Should I expect to have pain after surgery? What are my pain management options? Are there nonmedical options that I can try for pain?  Tell a health care provider about:  Any allergies you have.  All medicines you are taking, including vitamins, herbs, eye drops, creams, and over-the-counter medicines.  Any problems you or family members have had with anesthetic medicines.  Any blood disorders you have.  Any surgeries you have had.  Any medical conditions you have.  Whether you are pregnant or may be pregnant.  What are the risks?  The risks and complications of surgery depend on the specific procedure that you have. Discuss all the risks with your health care providers before your surgery. Ask about common surgical complications, which may include:  Infection.  Bleeding or a need for blood replacement (transfusion).  Allergic reactions to medicines.  Damage to surrounding nerves, tissues, or structures.  A blood clot.  Scarring.  Failure of the surgery to correct the problem.  Follow these instructions before the procedure:  Several days or weeks before your procedure  You may have a physical exam by your primary health care provider to make sure it is safe for you to have surgery.  You may have testing. This may include a chest X-ray, blood and urine tests, electrocardiogram (ECG), or other testing.  Ask your health care provider about:  Changing or stopping your regular medicines. This is especially important if you are taking diabetes medicines or blood thinners.  Taking medicines such as aspirin and ibuprofen. These medicines can thin your blood. Do not take these medicines unless your health care provider tells you to take them.  Taking over-the-counter medicines, vitamins, herbs, and supplements.  Do not  use any products that contain nicotine or tobacco, such as cigarettes and e-cigarettes. If you need help quitting, ask your health care provider.  Avoid alcohol.  Ask your health care provider if there are exercises you can do to prepare for surgery.  Eat a healthy diet.   Plan to have someone 18 years of age or older to take you home from the hospital. We will need to verify your ride on the morning of surgery if you are being discharged home on the same day. Tell your ride to be expecting a call from the hospital prior to your procedure.   Plan to have a responsible adult care for you for at least 24 hours after you leave the hospital or clinic. This is important.  The day before your procedure  You may be given antibiotic medicine to take by mouth to help prevent infection. Take it as told by your health care provider.  You may be asked to shower with a germ-killing soap.  Follow instructions from your health care provider about eating and drinking restrictions. This includes gum, mints and hard candy.  Pack comfortable clothes according to your procedure.   The day of your procedure  You may need to take another shower with a germ-killing soap before you leave home in the morning.  With a small sip of water, take only the medicines that you are told to take.  Remove all jewelry including rings.   Leave anything you consider valuable at home except hearing aids if needed.  You do not need to bring your home medications into the hospital.   Do not wear any makeup, nail polish, powder, deodorant, lotion, hair accessories, or anything on your skin or body except your clothes.  If you will be staying in the hospital, bring a case to hold your glasses, contacts, or dentures. You may also want to bring your robe and non-skid footwear.       (Do not use denture adhesives since you will be asked to remove them during  surgery).   If you wear oxygen at home, bring it with you the day of surgery.  If instructed by your  health care provider, bring your sleep apnea device with you on the day of your surgery (if this applies to you).  You may want to leave your suitcase and sleep apnea device in the car until after surgery.   Arrive at the hospital as scheduled.  Bring a friend or family member with you who can help to answer questions and be present while you meet with your health care provider.  At the hospital  When you arrive at the hospital:  Go to registration located at the main entrance of the hospital. You will be registered and given a beeper and a sticker sheet. Take the stickers to the Outpatient nurses desk and place in the black tray. This is to notify staff that you have arrived. Then return to the lobby to wait.   When your beeper lights up and vibrates proceed through the double doors, under the stairs, and a member of the Outpatient Surgery staff will escort you to your preoperative room.  You may have to wear compression sleeves. These help to prevent blood clots and reduce swelling in your legs.  An IV may be inserted into one of your veins.              In the operating room, you may be given one or more of the following:        A medicine to help you relax (sedative).        A medicine to numb the area (local anesthetic).        A medicine to make you fall asleep (general anesthetic).        A medicine that is injected into an area of your body to numb everything below the                      injection site (regional anesthetic).  You may be given an antibiotic through your IV to help prevent infection.  Your surgical site will be marked or identified.    Contact a health care provider if you:  Develop a fever of more than 100.4øF (38øC) or other feelings of illness during the 48 hours before your surgery.  Have symptoms that get worse.  Have questions or concerns about your surgery.  Summary  Preparing for surgery can make the procedure go more smoothly and lower your risk of complications.  Before surgery,  make a list of questions and concerns to discuss with your surgeon. Ask about the risks and possible complications.  In the days or weeks before your surgery, follow all instructions from your health care provider. You may need to stop smoking, avoid alcohol, follow eating restrictions, and change or stop your regular medicines.  Contact your surgeon if you develop a fever or other signs of illness during the few days before your surgery.  This information is not intended to replace advice given to you by your health care provider. Make sure you discuss any questions you have with your health care provider.  Document Revised: 12/21/2018 Document Reviewed: 10/23/2018  [employer] Patient Education c 2021 [employer]

## 2023-08-25 LAB
QT INTERVAL: 374 MS
QTC INTERVAL: 423 MS

## 2023-08-29 ENCOUNTER — TELEPHONE (OUTPATIENT)
Dept: UROLOGY | Facility: CLINIC | Age: 73
End: 2023-08-29
Payer: MEDICARE

## 2023-08-29 NOTE — TELEPHONE ENCOUNTER
Called patient to remind them to arrive at patient registration on 8/31 at 0500 for the procedure with Dr. Pollard. Spoke with patient. Told patient if they had any questions to please contact our office at [phone].

## 2023-08-31 ENCOUNTER — ANESTHESIA (OUTPATIENT)
Dept: PERIOP | Facility: HOSPITAL | Age: 73
End: 2023-08-31
Payer: MEDICARE

## 2023-08-31 ENCOUNTER — ANESTHESIA EVENT (OUTPATIENT)
Dept: PERIOP | Facility: HOSPITAL | Age: 73
End: 2023-08-31
Payer: MEDICARE

## 2023-08-31 ENCOUNTER — HOSPITAL ENCOUNTER (OUTPATIENT)
Facility: HOSPITAL | Age: 73
Discharge: HOME | End: 2023-09-01
Attending: UROLOGY | Admitting: UROLOGY
Payer: MEDICARE

## 2023-08-31 DIAGNOSIS — N13.8 BPH WITH URINARY OBSTRUCTION: ICD-10-CM

## 2023-08-31 DIAGNOSIS — N40.1 BPH WITH URINARY OBSTRUCTION: ICD-10-CM

## 2023-08-31 LAB
BASOPHILS # BLD AUTO: 0.07 10*3/MM3 (ref 0–0.2)
BASOPHILS NFR BLD AUTO: 1.2 % (ref 0–1.5)
DEPRECATED RDW RBC AUTO: 42.7 FL (ref 37–54)
EOSINOPHIL # BLD AUTO: 0.15 10*3/MM3 (ref 0–0.4)
EOSINOPHIL NFR BLD AUTO: 2.6 % (ref 0.3–6.2)
ERYTHROCYTE [DISTWIDTH] IN BLOOD BY AUTOMATED COUNT: 12.3 % (ref 12.3–15.4)
HCT VFR BLD AUTO: 39 % (ref 37.5–51)
HGB BLD-MCNC: 12.5 G/DL (ref 13–17.7)
IMM GRANULOCYTES # BLD AUTO: 0.01 10*3/MM3 (ref 0–0.05)
IMM GRANULOCYTES NFR BLD AUTO: 0.2 % (ref 0–0.5)
LYMPHOCYTES # BLD AUTO: 1.79 10*3/MM3 (ref 0.7–3.1)
LYMPHOCYTES NFR BLD AUTO: 31.3 % (ref 19.6–45.3)
MCH RBC QN AUTO: 30.5 PG (ref 26.6–33)
MCHC RBC AUTO-ENTMCNC: 32.1 G/DL (ref 31.5–35.7)
MCV RBC AUTO: 95.1 FL (ref 79–97)
MONOCYTES # BLD AUTO: 0.29 10*3/MM3 (ref 0.1–0.9)
MONOCYTES NFR BLD AUTO: 5.1 % (ref 5–12)
NEUTROPHILS NFR BLD AUTO: 3.41 10*3/MM3 (ref 1.7–7)
NEUTROPHILS NFR BLD AUTO: 59.6 % (ref 42.7–76)
NRBC BLD AUTO-RTO: 0 /100 WBC (ref 0–0.2)
PLATELET # BLD AUTO: 192 10*3/MM3 (ref 140–450)
PMV BLD AUTO: 10 FL (ref 6–12)
RBC # BLD AUTO: 4.1 10*6/MM3 (ref 4.14–5.8)
WBC NRBC COR # BLD: 5.72 10*3/MM3 (ref 3.4–10.8)

## 2023-08-31 PROCEDURE — 63710000001 HYDROCODONE-ACETAMINOPHEN 5-325 MG TABLET: Performed by: ANESTHESIOLOGY

## 2023-08-31 PROCEDURE — 25010000002 FENTANYL CITRATE (PF) 50 MCG/ML SOLUTION: Performed by: NURSE ANESTHETIST, CERTIFIED REGISTERED

## 2023-08-31 PROCEDURE — 25010000002 ONDANSETRON PER 1 MG: Performed by: NURSE ANESTHETIST, CERTIFIED REGISTERED

## 2023-08-31 PROCEDURE — A9270 NON-COVERED ITEM OR SERVICE: HCPCS | Performed by: UROLOGY

## 2023-08-31 PROCEDURE — 25010000002 FENTANYL CITRATE (PF) 50 MCG/ML SOLUTION: Performed by: ANESTHESIOLOGY

## 2023-08-31 PROCEDURE — 63710000001 OXYBUTYNIN XL 5 MG TABLET SUSTAINED-RELEASE 24 HOUR: Performed by: UROLOGY

## 2023-08-31 PROCEDURE — 85025 COMPLETE CBC W/AUTO DIFF WBC: CPT | Performed by: UROLOGY

## 2023-08-31 PROCEDURE — A9270 NON-COVERED ITEM OR SERVICE: HCPCS | Performed by: ANESTHESIOLOGY

## 2023-08-31 PROCEDURE — C2596 PROBE, ROBOTIC, WATER-JET: HCPCS | Performed by: UROLOGY

## 2023-08-31 PROCEDURE — 25010000002 PROPOFOL 10 MG/ML EMULSION: Performed by: NURSE ANESTHETIST, CERTIFIED REGISTERED

## 2023-08-31 PROCEDURE — 25010000002 CEFAZOLIN PER 500 MG: Performed by: UROLOGY

## 2023-08-31 RX ORDER — HYDROCODONE BITARTRATE AND ACETAMINOPHEN 5; 325 MG/1; MG/1
1 TABLET ORAL ONCE AS NEEDED
Status: COMPLETED | OUTPATIENT
Start: 2023-08-31 | End: 2023-08-31

## 2023-08-31 RX ORDER — NALOXONE HCL 0.4 MG/ML
0.1 VIAL (ML) INJECTION
Status: DISCONTINUED | OUTPATIENT
Start: 2023-08-31 | End: 2023-09-01 | Stop reason: HOSPADM

## 2023-08-31 RX ORDER — SODIUM CHLORIDE, SODIUM LACTATE, POTASSIUM CHLORIDE, CALCIUM CHLORIDE 600; 310; 30; 20 MG/100ML; MG/100ML; MG/100ML; MG/100ML
75 INJECTION, SOLUTION INTRAVENOUS CONTINUOUS
Status: DISCONTINUED | OUTPATIENT
Start: 2023-08-31 | End: 2023-09-01 | Stop reason: HOSPADM

## 2023-08-31 RX ORDER — ONDANSETRON 2 MG/ML
4 INJECTION INTRAMUSCULAR; INTRAVENOUS ONCE AS NEEDED
Status: DISCONTINUED | OUTPATIENT
Start: 2023-08-31 | End: 2023-08-31 | Stop reason: HOSPADM

## 2023-08-31 RX ORDER — SODIUM CHLORIDE 0.9 % (FLUSH) 0.9 %
10 SYRINGE (ML) INJECTION EVERY 12 HOURS SCHEDULED
Status: DISCONTINUED | OUTPATIENT
Start: 2023-08-31 | End: 2023-08-31 | Stop reason: HOSPADM

## 2023-08-31 RX ORDER — ONDANSETRON 2 MG/ML
4 INJECTION INTRAMUSCULAR; INTRAVENOUS EVERY 6 HOURS PRN
Status: DISCONTINUED | OUTPATIENT
Start: 2023-08-31 | End: 2023-09-01 | Stop reason: HOSPADM

## 2023-08-31 RX ORDER — DROPERIDOL 2.5 MG/ML
0.62 INJECTION, SOLUTION INTRAMUSCULAR; INTRAVENOUS ONCE AS NEEDED
Status: DISCONTINUED | OUTPATIENT
Start: 2023-08-31 | End: 2023-08-31 | Stop reason: HOSPADM

## 2023-08-31 RX ORDER — FENTANYL CITRATE 50 UG/ML
25 INJECTION, SOLUTION INTRAMUSCULAR; INTRAVENOUS
Status: DISCONTINUED | OUTPATIENT
Start: 2023-08-31 | End: 2023-08-31 | Stop reason: HOSPADM

## 2023-08-31 RX ORDER — ACETAMINOPHEN 500 MG
1000 TABLET ORAL ONCE
Status: COMPLETED | OUTPATIENT
Start: 2023-08-31 | End: 2023-08-31

## 2023-08-31 RX ORDER — SODIUM CHLORIDE 0.9 % (FLUSH) 0.9 %
3 SYRINGE (ML) INJECTION EVERY 12 HOURS SCHEDULED
Status: DISCONTINUED | OUTPATIENT
Start: 2023-08-31 | End: 2023-08-31 | Stop reason: HOSPADM

## 2023-08-31 RX ORDER — ONDANSETRON 4 MG/1
4 TABLET, FILM COATED ORAL EVERY 6 HOURS PRN
Status: DISCONTINUED | OUTPATIENT
Start: 2023-08-31 | End: 2023-09-01 | Stop reason: HOSPADM

## 2023-08-31 RX ORDER — SODIUM CHLORIDE, SODIUM LACTATE, POTASSIUM CHLORIDE, CALCIUM CHLORIDE 600; 310; 30; 20 MG/100ML; MG/100ML; MG/100ML; MG/100ML
1000 INJECTION, SOLUTION INTRAVENOUS CONTINUOUS
Status: DISCONTINUED | OUTPATIENT
Start: 2023-08-31 | End: 2023-08-31

## 2023-08-31 RX ORDER — PROMETHAZINE HYDROCHLORIDE 25 MG/1
12.5 TABLET ORAL EVERY 6 HOURS PRN
Status: DISCONTINUED | OUTPATIENT
Start: 2023-08-31 | End: 2023-09-01 | Stop reason: HOSPADM

## 2023-08-31 RX ORDER — MIDAZOLAM HYDROCHLORIDE 1 MG/ML
0.5 INJECTION INTRAMUSCULAR; INTRAVENOUS
Status: DISCONTINUED | OUTPATIENT
Start: 2023-08-31 | End: 2023-08-31 | Stop reason: HOSPADM

## 2023-08-31 RX ORDER — NALOXONE HCL 0.4 MG/ML
0.4 VIAL (ML) INJECTION AS NEEDED
Status: DISCONTINUED | OUTPATIENT
Start: 2023-08-31 | End: 2023-08-31 | Stop reason: HOSPADM

## 2023-08-31 RX ORDER — ONDANSETRON 2 MG/ML
INJECTION INTRAMUSCULAR; INTRAVENOUS AS NEEDED
Status: DISCONTINUED | OUTPATIENT
Start: 2023-08-31 | End: 2023-08-31 | Stop reason: SURG

## 2023-08-31 RX ORDER — PROPOFOL 10 MG/ML
VIAL (ML) INTRAVENOUS AS NEEDED
Status: DISCONTINUED | OUTPATIENT
Start: 2023-08-31 | End: 2023-08-31 | Stop reason: SURG

## 2023-08-31 RX ORDER — FLUMAZENIL 0.1 MG/ML
0.2 INJECTION INTRAVENOUS AS NEEDED
Status: DISCONTINUED | OUTPATIENT
Start: 2023-08-31 | End: 2023-08-31 | Stop reason: HOSPADM

## 2023-08-31 RX ORDER — IBUPROFEN 600 MG/1
600 TABLET ORAL ONCE AS NEEDED
Status: DISCONTINUED | OUTPATIENT
Start: 2023-08-31 | End: 2023-08-31 | Stop reason: HOSPADM

## 2023-08-31 RX ORDER — LIDOCAINE HYDROCHLORIDE 20 MG/ML
INJECTION, SOLUTION EPIDURAL; INFILTRATION; INTRACAUDAL; PERINEURAL AS NEEDED
Status: DISCONTINUED | OUTPATIENT
Start: 2023-08-31 | End: 2023-08-31 | Stop reason: SURG

## 2023-08-31 RX ORDER — SODIUM CHLORIDE 9 MG/ML
40 INJECTION, SOLUTION INTRAVENOUS AS NEEDED
Status: DISCONTINUED | OUTPATIENT
Start: 2023-08-31 | End: 2023-08-31 | Stop reason: HOSPADM

## 2023-08-31 RX ORDER — FENTANYL CITRATE 50 UG/ML
INJECTION, SOLUTION INTRAMUSCULAR; INTRAVENOUS AS NEEDED
Status: DISCONTINUED | OUTPATIENT
Start: 2023-08-31 | End: 2023-08-31 | Stop reason: SURG

## 2023-08-31 RX ORDER — SODIUM CHLORIDE 9 MG/ML
100 INJECTION, SOLUTION INTRAVENOUS CONTINUOUS
Status: DISCONTINUED | OUTPATIENT
Start: 2023-08-31 | End: 2023-08-31

## 2023-08-31 RX ORDER — SODIUM CHLORIDE 0.9 % (FLUSH) 0.9 %
1-10 SYRINGE (ML) INJECTION AS NEEDED
Status: DISCONTINUED | OUTPATIENT
Start: 2023-08-31 | End: 2023-08-31 | Stop reason: HOSPADM

## 2023-08-31 RX ORDER — HYDROCODONE BITARTRATE AND ACETAMINOPHEN 10; 325 MG/1; MG/1
1 TABLET ORAL EVERY 4 HOURS PRN
Status: DISCONTINUED | OUTPATIENT
Start: 2023-08-31 | End: 2023-08-31 | Stop reason: HOSPADM

## 2023-08-31 RX ORDER — HYDROCODONE BITARTRATE AND ACETAMINOPHEN 5; 325 MG/1; MG/1
1 TABLET ORAL EVERY 4 HOURS PRN
Status: DISCONTINUED | OUTPATIENT
Start: 2023-08-31 | End: 2023-09-01 | Stop reason: HOSPADM

## 2023-08-31 RX ORDER — HYDROCODONE BITARTRATE AND ACETAMINOPHEN 10; 325 MG/1; MG/1
1 TABLET ORAL EVERY 4 HOURS PRN
Status: DISCONTINUED | OUTPATIENT
Start: 2023-08-31 | End: 2023-09-01 | Stop reason: HOSPADM

## 2023-08-31 RX ORDER — OXYBUTYNIN CHLORIDE 5 MG/1
5 TABLET, EXTENDED RELEASE ORAL DAILY
Status: DISCONTINUED | OUTPATIENT
Start: 2023-08-31 | End: 2023-09-01 | Stop reason: HOSPADM

## 2023-08-31 RX ORDER — SODIUM CHLORIDE 0.9 % (FLUSH) 0.9 %
3-10 SYRINGE (ML) INJECTION AS NEEDED
Status: DISCONTINUED | OUTPATIENT
Start: 2023-08-31 | End: 2023-08-31 | Stop reason: HOSPADM

## 2023-08-31 RX ORDER — LABETALOL HYDROCHLORIDE 5 MG/ML
5 INJECTION, SOLUTION INTRAVENOUS
Status: DISCONTINUED | OUTPATIENT
Start: 2023-08-31 | End: 2023-08-31 | Stop reason: HOSPADM

## 2023-08-31 RX ORDER — SODIUM CHLORIDE, SODIUM LACTATE, POTASSIUM CHLORIDE, CALCIUM CHLORIDE 600; 310; 30; 20 MG/100ML; MG/100ML; MG/100ML; MG/100ML
100 INJECTION, SOLUTION INTRAVENOUS CONTINUOUS
Status: DISCONTINUED | OUTPATIENT
Start: 2023-08-31 | End: 2023-08-31

## 2023-08-31 RX ORDER — MAGNESIUM HYDROXIDE 1200 MG/15ML
LIQUID ORAL AS NEEDED
Status: DISCONTINUED | OUTPATIENT
Start: 2023-08-31 | End: 2023-08-31 | Stop reason: HOSPADM

## 2023-08-31 RX ORDER — HYDROMORPHONE HYDROCHLORIDE 1 MG/ML
0.5 INJECTION, SOLUTION INTRAMUSCULAR; INTRAVENOUS; SUBCUTANEOUS
Status: DISCONTINUED | OUTPATIENT
Start: 2023-08-31 | End: 2023-09-01 | Stop reason: HOSPADM

## 2023-08-31 RX ORDER — DOCUSATE SODIUM 100 MG/1
100 CAPSULE, LIQUID FILLED ORAL 2 TIMES DAILY PRN
Status: DISCONTINUED | OUTPATIENT
Start: 2023-08-31 | End: 2023-09-01 | Stop reason: HOSPADM

## 2023-08-31 RX ADMIN — PROPOFOL 200 MG: 10 INJECTION, EMULSION INTRAVENOUS at 07:00

## 2023-08-31 RX ADMIN — FENTANYL CITRATE 25 MCG: 50 INJECTION, SOLUTION INTRAMUSCULAR; INTRAVENOUS at 09:45

## 2023-08-31 RX ADMIN — HYDROCODONE BITARTRATE AND ACETAMINOPHEN 1 TABLET: 5; 325 TABLET ORAL at 08:37

## 2023-08-31 RX ADMIN — FENTANYL CITRATE 100 MCG: 50 INJECTION, SOLUTION INTRAMUSCULAR; INTRAVENOUS at 07:00

## 2023-08-31 RX ADMIN — SODIUM CHLORIDE, POTASSIUM CHLORIDE, SODIUM LACTATE AND CALCIUM CHLORIDE 1000 ML: 600; 310; 30; 20 INJECTION, SOLUTION INTRAVENOUS at 06:03

## 2023-08-31 RX ADMIN — LIDOCAINE HYDROCHLORIDE 100 MG: 20 INJECTION, SOLUTION EPIDURAL; INFILTRATION; INTRACAUDAL; PERINEURAL at 07:00

## 2023-08-31 RX ADMIN — OXYBUTYNIN CHLORIDE 5 MG: 5 TABLET, EXTENDED RELEASE ORAL at 15:15

## 2023-08-31 RX ADMIN — CEFAZOLIN 2000 MG: 2 INJECTION, POWDER, FOR SOLUTION INTRAMUSCULAR; INTRAVENOUS at 07:06

## 2023-08-31 RX ADMIN — FENTANYL CITRATE 25 MCG: 50 INJECTION, SOLUTION INTRAMUSCULAR; INTRAVENOUS at 10:00

## 2023-08-31 RX ADMIN — CEFAZOLIN 2000 MG: 2 INJECTION, POWDER, FOR SOLUTION INTRAMUSCULAR; INTRAVENOUS at 22:34

## 2023-08-31 RX ADMIN — ONDANSETRON 4 MG: 2 INJECTION INTRAMUSCULAR; INTRAVENOUS at 07:50

## 2023-08-31 RX ADMIN — CEFAZOLIN 2000 MG: 2 INJECTION, POWDER, FOR SOLUTION INTRAMUSCULAR; INTRAVENOUS at 15:15

## 2023-08-31 RX ADMIN — GLYCOPYRROLATE 0.2 MG: 0.2 INJECTION INTRAMUSCULAR; INTRAVENOUS at 07:00

## 2023-08-31 RX ADMIN — ACETAMINOPHEN 1000 MG: 500 TABLET, FILM COATED ORAL at 06:31

## 2023-08-31 RX ADMIN — SODIUM CHLORIDE, POTASSIUM CHLORIDE, SODIUM LACTATE AND CALCIUM CHLORIDE 75 ML/HR: 600; 310; 30; 20 INJECTION, SOLUTION INTRAVENOUS at 15:16

## 2023-08-31 NOTE — PLAN OF CARE
Problem: Adult Inpatient Plan of Care  Goal: Plan of Care Review  Outcome: Ongoing, Progressing  Flowsheets (Taken 8/31/2023 1524)  Outcome Evaluation: Patient RA. IV CDI, IVF and IV abx infusing per order. CBI estevez cath, Clear output. No c/o pain. Alert x4 Tolerating reg diet. VSS, safety maintained.

## 2023-08-31 NOTE — OP NOTE
Operative Summary    Rasheed Arevalo  Date of Procedure: 8/31/2023    Pre-op Diagnosis:   BPH with urinary obstruction [N40.1, N13.8]    Post-op Diagnosis:     Post-Op Diagnosis Codes:     * BPH with urinary obstruction [N40.1, N13.8]    Procedure/CPT® Codes:      Procedure(s):  TRANSURETHRAL PROSTATE AQUABLATION    Surgeon(s):  Yaya Pollard MD    Anesthesia: General    Staff:   Circulator: Diana Locke RN; Farideh Kruse RN  Scrub Person: Hudson Estevez; Rose Hinojosa    Indications for procedure:  BPH with lower urinary tract symptoms    Findings:   First passed aqua ablation 3 minutes 17 seconds  Second pass aqua ablation 3 minutes 12 seconds  Total aqua ablation time 6 minutes 29 seconds  Cautery time 11 minutes  Total case time 39 minutes  Wide open prostatic fossa  No ureteral orifice injury or sphincter injury    Procedure details:  After appropriate anesthesia, positioning, prep and drape, timeout protocol was observed.     The TRUS (transrectal ultrasound) Stepper was mounted to the articulating arm and secured to the operating room bed.  The ultrasound probe was attached to the stepper.  The ultrasound probe was aligned, and confirmation made that the prostate is centered and aligned using both transverse and sagittal views.  The bladder neck, the verumontanum, and the central/transition zones are identified.    The 24F AQUABEAM Handpiece was inserted into the prostatic urethra and a complete cystoscopic evaluation was performed by inspecting the prostate, bladder, and identifying the location of the verumontanum/external sphincter.  The AQUABEAM handpiece was secured to the handpiece articulating arm.  This point I confirmed that the alignment of the AQUABEAM handpiece and in the transrectal sound probe were parallel and linear.  Confirmation that the AQUABEAM nozzle is centered and anterior at the bladder neck .  The cystoscope was then retracted to visualize the verumontanum the  "external sphincter.  The tip of the cystoscope was positioned just proximal to the external sphincter.  I then reconfirm the alignment of the TRUS probe with the AQUABEAM handpiece and compression was applied with the TRUS probe to improve imaging of the prostate.  At this point horizontal alignment of the handpiece water jet nozzle was performed.    The AQUABLATION treatment zones were planned utilizing real-time transrectal ultrasound to visualize the contour of the prostate, the depth and radial angles of resection as defined in the transverse view.  In the sagittal view, the aquablation nozzle was identified in position and registered with the software.  The treatment contours were then adjusted to conform to the intended resection margins.  Any median lobe tissue, bladder neck and verumontanum were marked and confirmed in the treatment contour.    The aquablation treatment is then started following resection contour confirmed under ultrasound guidance.  Adjustments were made as needed with regard to power of the water jet.  Total aquablation resection time was 6:29 minutes.     Once the aquablation resection was complete, cystoscopy is carried out again.  Tissue and clot evacuation were performed using a 27 French ACMI cystoscope/resectoscope until effluent was clear.  Cautery was used for hemostasis.  This required resection of some of the \"fluffy \"tissue ablated by aquablation.    This point a 24 French three-way Bowen catheter with 60 cc in the balloon is confirmed in its position by TRUS.  Patient within the transfer to the recovery room    Estimated Blood Loss: 50cc    Specimens:                None      Drains: 24 French three-way Bowen catheter    Complications: none    Plan: To floor with irrigation overnight and discharge home tomorrow  Follow-up Monday for catheter removal in 4 weeks with Dr. Pollard      (Please note that portions of this note were completed with a voice recognition program.)  Yaya" Rola Pollard MD     Date: 8/31/2023  Time: 08:11 CDT

## 2023-08-31 NOTE — ANESTHESIA PREPROCEDURE EVALUATION
Anesthesia Evaluation     Patient summary reviewed   no history of anesthetic complications:   NPO Solid Status: > 6 hours             Airway   Mallampati: II  TM distance: <3 FB  Anterior  Dental - normal exam     Pulmonary    (+) pulmonary embolism, a smoker Former,sleep apnea  Cardiovascular   Exercise tolerance: good (4-7 METS)    (+) DVT resolved  (-) cardiac stents, CABG      Neuro/Psych  (+) TIA, CVA  (-) seizures  GI/Hepatic/Renal/Endo    (+) obesity, renal disease stones    Musculoskeletal     Abdominal    Substance History      OB/GYN          Other   arthritis,                     Anesthesia Plan    ASA 2     general     intravenous induction     Anesthetic plan, risks, benefits, and alternatives have been provided, discussed and informed consent has been obtained with: patient.      CODE STATUS:

## 2023-08-31 NOTE — ANESTHESIA PROCEDURE NOTES
Airway  Urgency: elective    Date/Time: 8/31/2023 7:01 AM  Airway not difficult    General Information and Staff    Patient location during procedure: OR    Indications and Patient Condition  Indications for airway management: airway protection    Preoxygenated: yes  MILS maintained throughout  Mask difficulty assessment: 1 - vent by mask    Final Airway Details  Final airway type: supraglottic airway      Successful airway: classic and LMA  Size 5     Number of attempts at approach: 1  Assessment: lips, teeth, and gum same as pre-op and atraumatic intubation

## 2023-08-31 NOTE — ANESTHESIA POSTPROCEDURE EVALUATION
Patient: Rasheed Arevalo    Procedure Summary       Date: 08/31/23 Room / Location:  [facility] OR 08 /  [facility] OR    Anesthesia Start: 0656 Anesthesia Stop: 0812    Procedure: TRANSURETHRAL PROSTATE AQUABLATION (Bladder) Diagnosis:       BPH with urinary obstruction      (BPH with urinary obstruction [N40.1, N13.8])    Surgeons: Yaya Pollard MD Provider: Aldo Herring CRNA    Anesthesia Type: general ASA Status: 2            Anesthesia Type: general    Vitals  Vitals Value Taken Time   /67 08/31/23 1200   Temp 98.1 øF (36.7 øC) 08/31/23 1200   Pulse 63 08/31/23 1200   Resp 16 08/31/23 1200   SpO2 98 % 08/31/23 1200           Post Anesthesia Care and Evaluation    Patient location during evaluation: PACU  Patient participation: complete - patient participated  Level of consciousness: awake and alert  Pain management: adequate    Airway patency: patent  Anesthetic complications: No anesthetic complications  PONV Status: none  Cardiovascular status: acceptable and hemodynamically stable  Respiratory status: acceptable  Hydration status: acceptable    Comments: Blood pressure 120/60, pulse 80, temperature 98.2 øF (36.8 øC), temperature source Oral, resp. rate 16, weight 99.4 kg (219 lb 2.2 oz), SpO2 96 %.    Patient discharged from PACU based upon Adina score. Please see RN notes for further details
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
## 2023-09-01 VITALS
HEIGHT: 71 IN | SYSTOLIC BLOOD PRESSURE: 110 MMHG | OXYGEN SATURATION: 100 % | RESPIRATION RATE: 16 BRPM | WEIGHT: 219.14 LBS | TEMPERATURE: 98.4 F | DIASTOLIC BLOOD PRESSURE: 62 MMHG | BODY MASS INDEX: 30.68 KG/M2 | HEART RATE: 50 BPM

## 2023-09-01 LAB
BASOPHILS # BLD AUTO: 0.02 10*3/MM3 (ref 0–0.2)
BASOPHILS NFR BLD AUTO: 0.1 % (ref 0–1.5)
DEPRECATED RDW RBC AUTO: 43.4 FL (ref 37–54)
EOSINOPHIL # BLD AUTO: 0 10*3/MM3 (ref 0–0.4)
EOSINOPHIL NFR BLD AUTO: 0 % (ref 0.3–6.2)
ERYTHROCYTE [DISTWIDTH] IN BLOOD BY AUTOMATED COUNT: 12.3 % (ref 12.3–15.4)
HCT VFR BLD AUTO: 37.4 % (ref 37.5–51)
HGB BLD-MCNC: 11.8 G/DL (ref 13–17.7)
IMM GRANULOCYTES # BLD AUTO: 0.07 10*3/MM3 (ref 0–0.05)
IMM GRANULOCYTES NFR BLD AUTO: 0.5 % (ref 0–0.5)
LYMPHOCYTES # BLD AUTO: 1.6 10*3/MM3 (ref 0.7–3.1)
LYMPHOCYTES NFR BLD AUTO: 10.4 % (ref 19.6–45.3)
MCH RBC QN AUTO: 30.5 PG (ref 26.6–33)
MCHC RBC AUTO-ENTMCNC: 31.6 G/DL (ref 31.5–35.7)
MCV RBC AUTO: 96.6 FL (ref 79–97)
MONOCYTES # BLD AUTO: 1 10*3/MM3 (ref 0.1–0.9)
MONOCYTES NFR BLD AUTO: 6.5 % (ref 5–12)
NEUTROPHILS NFR BLD AUTO: 12.72 10*3/MM3 (ref 1.7–7)
NEUTROPHILS NFR BLD AUTO: 82.5 % (ref 42.7–76)
NRBC BLD AUTO-RTO: 0 /100 WBC (ref 0–0.2)
PLATELET # BLD AUTO: 196 10*3/MM3 (ref 140–450)
PMV BLD AUTO: 10.7 FL (ref 6–12)
RBC # BLD AUTO: 3.87 10*6/MM3 (ref 4.14–5.8)
WBC NRBC COR # BLD: 15.41 10*3/MM3 (ref 3.4–10.8)

## 2023-09-01 PROCEDURE — 85025 COMPLETE CBC W/AUTO DIFF WBC: CPT | Performed by: UROLOGY

## 2023-09-01 PROCEDURE — A9270 NON-COVERED ITEM OR SERVICE: HCPCS | Performed by: UROLOGY

## 2023-09-01 PROCEDURE — 63710000001 OXYBUTYNIN XL 5 MG TABLET SUSTAINED-RELEASE 24 HOUR: Performed by: UROLOGY

## 2023-09-01 RX ORDER — HYOSCYAMINE SULFATE 0.12 MG/1
0.12 TABLET SUBLINGUAL EVERY 6 HOURS PRN
Qty: 30 EACH | Refills: 0 | Status: SHIPPED | OUTPATIENT
Start: 2023-09-01 | End: 2023-09-06

## 2023-09-01 RX ADMIN — SODIUM CHLORIDE, POTASSIUM CHLORIDE, SODIUM LACTATE AND CALCIUM CHLORIDE 75 ML/HR: 600; 310; 30; 20 INJECTION, SOLUTION INTRAVENOUS at 05:49

## 2023-09-01 RX ADMIN — OXYBUTYNIN CHLORIDE 5 MG: 5 TABLET, EXTENDED RELEASE ORAL at 09:10

## 2023-09-01 NOTE — DISCHARGE SUMMARY
Date of Discharge:  9/1/2023    Discharge Diagnosis: BPH    Presenting Problem/History of Present Illness  BPH with urinary obstruction [N40.1, N13.8]     Hospital Course  See notes    Urine clear with CBI on very minimal drip. Patient ready to go home.    Procedures Performed  Procedure(s):  TRANSURETHRAL PROSTATE AQUABLATION       Consults:   Consults       No orders found for last 30 day(s).            Condition on Discharge:  Stable    Vital Signs  Temp:  [96.6 °F (35.9 °C)-98.5 °F (36.9 °C)] 98.4 °F (36.9 °C)  Heart Rate:  [50-84] 50  Resp:  [12-16] 16  BP: ()/(45-76) 110/62    Discharge Disposition  Home or Self Care    Discharge Medications     Discharge Medications        New Medications        Instructions Start Date   Hyoscyamine Sulfate SL 0.125 MG sublingual tablet  Commonly known as: Levsin/SL   0.125 mg, Sublingual, Every 6 Hours PRN             Continue These Medications        Instructions Start Date   atorvastatin 10 MG tablet  Commonly known as: LIPITOR   10 mg, Oral, Daily               Discharge Diet: Resume home diet. Increase water intake.    Activity at Discharge: Light duty. Catheter care.    Follow-up Appointments  Future Appointments   Date Time Provider Department Center   10/26/2023  2:00 PM Wild Alatorre DO [facility]         Test Results Pending at Discharge       Louis Hartmann MD  09/01/23  09:38 CDT    Time: Discharge 15 min

## 2023-09-01 NOTE — PLAN OF CARE
Problem: Adult Inpatient Plan of Care  Goal: Plan of Care Review  Outcome: Ongoing, Progressing  Flowsheets (Taken 9/1/2023 0324)  Progress: improving  Plan of Care Reviewed With: patient  Outcome Evaluation: Pt resting well through shift. Urine clear at start of shift w/ good output from CBI. No c/o pain. IV abx and LR @ 75mL/hr. SCDs. Safety maintained. VSS.

## 2023-09-05 ENCOUNTER — PROCEDURE VISIT (OUTPATIENT)
Dept: UROLOGY | Facility: CLINIC | Age: 73
End: 2023-09-05
Payer: MEDICARE

## 2023-09-05 VITALS — OXYGEN SATURATION: 96 % | HEART RATE: 87 BPM | DIASTOLIC BLOOD PRESSURE: 78 MMHG | SYSTOLIC BLOOD PRESSURE: 130 MMHG

## 2023-09-05 DIAGNOSIS — N13.8 BPH WITH URINARY OBSTRUCTION: Primary | ICD-10-CM

## 2023-09-05 DIAGNOSIS — N40.1 BPH WITH URINARY OBSTRUCTION: Primary | ICD-10-CM

## 2023-09-06 ENCOUNTER — TELEPHONE (OUTPATIENT)
Dept: UROLOGY | Facility: CLINIC | Age: 73
End: 2023-09-06
Payer: MEDICARE

## 2023-09-06 NOTE — TELEPHONE ENCOUNTER
Patient called with c/o a fever and chills. I asked pt how high his fever has gotten- he said the highest has been 99.8. He just took some tylenol, I informed him that if it continues to rise, to go to urgent care to make sure there is no infection
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
## 2023-10-18 ENCOUNTER — OFFICE VISIT (OUTPATIENT)
Dept: UROLOGY | Facility: CLINIC | Age: 73
End: 2023-10-18
Payer: MEDICARE

## 2023-10-18 VITALS — BODY MASS INDEX: 30.46 KG/M2 | TEMPERATURE: 98 F | WEIGHT: 217.6 LBS | HEIGHT: 71 IN

## 2023-10-18 DIAGNOSIS — N13.8 BPH WITH URINARY OBSTRUCTION: Primary | ICD-10-CM

## 2023-10-18 DIAGNOSIS — N40.1 BPH WITH URINARY OBSTRUCTION: Primary | ICD-10-CM

## 2023-10-18 DIAGNOSIS — N20.0 NEPHROLITHIASIS: ICD-10-CM

## 2023-10-18 DIAGNOSIS — N28.1 RENAL CYSTS, ACQUIRED, BILATERAL: ICD-10-CM

## 2023-10-18 LAB
BILIRUB BLD-MCNC: NEGATIVE MG/DL
CLARITY, POC: ABNORMAL
COLOR UR: YELLOW
GLUCOSE UR STRIP-MCNC: NEGATIVE MG/DL
KETONES UR QL: NEGATIVE
LEUKOCYTE EST, POC: ABNORMAL
NITRITE UR-MCNC: NEGATIVE MG/ML
PH UR: 7 [PH] (ref 5–8)
PROT UR STRIP-MCNC: ABNORMAL MG/DL
RBC # UR STRIP: ABNORMAL /UL
SP GR UR: 1.02 (ref 1–1.03)
UROBILINOGEN UR QL: ABNORMAL

## 2023-10-26 ENCOUNTER — OFFICE VISIT (OUTPATIENT)
Dept: INTERNAL MEDICINE | Facility: CLINIC | Age: 73
End: 2023-10-26
Payer: MEDICARE

## 2023-10-26 VITALS
OXYGEN SATURATION: 98 % | WEIGHT: 217.5 LBS | SYSTOLIC BLOOD PRESSURE: 130 MMHG | DIASTOLIC BLOOD PRESSURE: 75 MMHG | HEIGHT: 71 IN | RESPIRATION RATE: 16 BRPM | BODY MASS INDEX: 30.45 KG/M2 | HEART RATE: 74 BPM

## 2023-10-26 DIAGNOSIS — D72.828 OTHER ELEVATED WHITE BLOOD CELL (WBC) COUNT: ICD-10-CM

## 2023-10-26 DIAGNOSIS — E78.2 MIXED HYPERLIPIDEMIA: Primary | ICD-10-CM

## 2023-10-26 DIAGNOSIS — Z23 NEED FOR VACCINATION: ICD-10-CM

## 2023-10-26 DIAGNOSIS — G47.33 OSA ON CPAP: ICD-10-CM

## 2023-10-26 DIAGNOSIS — D72.820 ATYPICAL LYMPHOCYTOSIS: ICD-10-CM

## 2023-10-26 DIAGNOSIS — R05.3 CHRONIC COUGH: ICD-10-CM

## 2023-10-26 DIAGNOSIS — R71.8 POIKILOCYTOSIS: ICD-10-CM

## 2023-10-26 DIAGNOSIS — R93.89 ABNORMAL CHEST X-RAY: ICD-10-CM

## 2023-10-26 PROBLEM — Z86.73 HISTORY OF TIA (TRANSIENT ISCHEMIC ATTACK): Status: RESOLVED | Noted: 2019-02-07 | Resolved: 2023-10-26

## 2023-10-26 RX ORDER — MULTIPLE VITAMINS W/ MINERALS TAB 9MG-400MCG
1 TAB ORAL DAILY
COMMUNITY

## 2023-10-26 RX ORDER — FLUTICASONE PROPIONATE 50 MCG
2 SPRAY, SUSPENSION (ML) NASAL NIGHTLY
COMMUNITY
End: 2023-10-28

## 2023-10-26 NOTE — PATIENT INSTRUCTIONS
Medicare Wellness  Personal Prevention Plan of Service     Date of Office Visit:    Encounter Provider:  Wild Alatorre DO  Place of Service:  [facility] INTERNAL MEDICINE  Patient Name: Rasheed Arevalo  :  1950    As part of the Medicare Wellness portion of your visit today, we are providing you with this personalized preventive plan of services (PPPS). This plan is based upon recommendations of the [place] Preventive Services Task Force (USPSTF) and the Advisory Committee on Immunization Practices (ACIP).    This lists the preventive care services that should be considered, and provides dates of when you are due. Items listed as completed are up-to-date and do not require any further intervention.    Health Maintenance   Topic Date Due    LIPID PANEL  2023    COVID-19 Vaccine (2023- season) 2023    ANNUAL WELLNESS VISIT  10/14/2023    BMI FOLLOWUP  10/14/2023    COLORECTAL CANCER SCREENING  2025    TDAP/TD VACCINES (3 - Td or Tdap) 2027    HEPATITIS C SCREENING  Completed    INFLUENZA VACCINE  Completed    Pneumococcal Vaccine 65+  Completed    AAA SCREEN (ONE-TIME)  Completed    ZOSTER VACCINE  Completed       Orders Placed This Encounter   Procedures    XR Chest PA & Lateral     Standing Status:   Future     Standing Expiration Date:   10/26/2024     Order Specific Question:   Reason for Exam:     Answer:   chronic cough     Order Specific Question:   Release to patient     Answer:   Routine Release [[id number]]    COVID-19 F23 (Pfizer) 12yrs+ (COMIRNATY)    Comprehensive Metabolic Panel     Standing Status:   Future     Standing Expiration Date:   10/26/2024     Order Specific Question:   Release to patient     Answer:   Routine Release [[id number]]    Lipid Panel     Standing Status:   Future     Standing Expiration Date:   10/26/2024     Order Specific Question:   Release to patient     Answer:   Routine Release [[id number]]    CBC (No  Diff)     Standing Status:   Future     Standing Expiration Date:   10/25/2024     Order Specific Question:   Release to patient     Answer:   Routine Release [[id number]]       Return in about 1 year (around 10/26/2024) for Medicare Wellness.

## 2023-10-26 NOTE — PROGRESS NOTES
The ABCs of the Annual Wellness Visit  Subsequent Medicare Wellness Visit    Subjective      Rasheed Arevalo is a 73 y.o. male who presents for a Subsequent Medicare Wellness Visit.    The following portions of the patient's history were reviewed and   updated as appropriate: allergies, current medications, past family history, past medical history, past social history, past surgical history, and problem list.    Compared to one year ago, the patient feels his physical   health is the same.    Compared to one year ago, the patient feels his mental   health is the same.    Recent Hospitalizations:  He was admitted within the past 365 days at [facility].       Current Medical Providers:  Patient Care Team:  Wild Alatorre DO as PCP - General (Internal Medicine)  Shaye Acevedo APRN (Inactive) as Nurse Practitioner (Otolaryngology)  Gavin Kitchen MD as Consulting Physician (Otolaryngology)    Outpatient Medications Prior to Visit   Medication Sig Dispense Refill    atorvastatin (LIPITOR) 10 MG tablet Take 1 tablet by mouth Daily. 90 tablet 2    fluticasone (FLONASE) 50 MCG/ACT nasal spray 2 sprays into the nostril(s) as directed by provider Every Night.      multivitamin with minerals tablet tablet Take 1 tablet by mouth Daily.       No facility-administered medications prior to visit.       No opioid medication identified on active medication list. I have reviewed chart for other potential  high risk medication/s and harmful drug interactions in the elderly.        Aspirin is not on active medication list.  Aspirin use is not indicated based on review of current medical condition/s. Risk of harm outweighs potential benefits.  .    Patient Active Problem List   Diagnosis    Ataxia    Mixed hyperlipidemia    Renal cysts, acquired, bilateral    Benign non-nodular prostatic hyperplasia without lower urinary tract symptoms    Chronic non-seasonal allergic rhinitis    History of kidney stones    Overweight  "(BMI 25.0-29.9)    Supraventricular tachycardia    NAVNEET on CPAP    Bilateral sensorineural hearing loss    Chronic neck and back pain    BPH with urinary obstruction     Advance Care Planning   Advance Care Planning     Advance Directive is on file.  ACP discussion was held with the patient during this visit. Patient has an advance directive in EMR which is still valid.      Objective    Vitals:    10/26/23 1353   BP: 130/75   BP Location: Left arm   Patient Position: Sitting   Cuff Size: Adult   Pulse: 74   Resp: 16   SpO2: 98%   Weight: 98.7 kg (217 lb 8 oz)   Height: 180.3 cm (71\")     Estimated body mass index is 30.34 kg/m² as calculated from the following:    Height as of this encounter: 180.3 cm (71\").    Weight as of this encounter: 98.7 kg (217 lb 8 oz).    BMI is >= 30 and <35. (Class 1 Obesity). The following options were offered after discussion;: exercise counseling/recommendations and nutrition counseling/recommendations      Does the patient have evidence of cognitive impairment?   No            HEALTH RISK ASSESSMENT    Smoking Status:  Social History     Tobacco Use   Smoking Status Former    Packs/day: 1.00    Years: 6.00    Additional pack years: 0.00    Total pack years: 6.00    Types: Cigarettes    Start date: 1966    Quit date: 1970    Years since quittin.8    Passive exposure: Past   Smokeless Tobacco Never   Tobacco Comments    Smoked cigarettes when young.  Quit in college.  Never since.     Alcohol Consumption:  Social History     Substance and Sexual Activity   Alcohol Use No     Fall Risk Screen:    STEADI Fall Risk Assessment was completed, and patient is at LOW risk for falls.Assessment completed on:10/26/2023    Depression Screening:      10/26/2023     2:05 PM   PHQ-2/PHQ-9 Depression Screening   Little Interest or Pleasure in Doing Things 0-->not at all   Feeling Down, Depressed or Hopeless 0-->not at all   PHQ-9: Brief Depression Severity Measure Score 0       Health " Habits and Functional and Cognitive Screening:      10/26/2023     2:00 PM   Functional & Cognitive Status   Do you have difficulty preparing food and eating? No   Do you have difficulty bathing yourself, getting dressed or grooming yourself? No   Do you have difficulty using the toilet? No   Do you have difficulty moving around from place to place? No   Do you have trouble with steps or getting out of a bed or a chair? No   Current Diet Unhealthy Diet   Dental Exam Up to date   Eye Exam Up to date   Exercise (times per week) 4 times per week   Current Exercises Include Other   Do you need help using the phone?  Yes   Are you deaf or do you have serious difficulty hearing?  No   Do you need help to go to places out of walking distance? No   Do you need help shopping? No   Do you need help preparing meals?  No   Do you need help with housework?  No   Do you need help with laundry? No   Do you need help taking your medications? No   Do you need help managing money? No   Do you ever drive or ride in a car without wearing a seat belt? No   Have you felt unusual stress, anger or loneliness in the last month? No   Who do you live with? Spouse   If you need help, do you have trouble finding someone available to you? No   Have you been bothered in the last four weeks by sexual problems? No   Do you have difficulty concentrating, remembering or making decisions? No       Age-appropriate Screening Schedule:  Refer to the list below for future screening recommendations based on patient's age, sex and/or medical conditions. Orders for these recommended tests are listed in the plan section. The patient has been provided with a written plan.    Health Maintenance   Topic Date Due    LIPID PANEL  01/07/2023    COVID-19 Vaccine (5 - 2023-24 season) 09/01/2023    ANNUAL WELLNESS VISIT  10/14/2023    BMI FOLLOWUP  10/14/2023    COLORECTAL CANCER SCREENING  12/29/2025    TDAP/TD VACCINES (3 - Td or Tdap) 08/16/2027    HEPATITIS C  SCREENING  Completed    INFLUENZA VACCINE  Completed    Pneumococcal Vaccine 65+  Completed    AAA SCREEN (ONE-TIME)  Completed    ZOSTER VACCINE  Completed                  CMS Preventative Services Quick Reference  Risk Factors Identified During Encounter:    Immunizations Discussed/Encouraged: COVID19  Dental Screening Recommended  Vision Screening Recommended    The above risks/problems have been discussed with the patient.  Pertinent information has been shared with the patient in the After Visit Summary.    Diagnoses and all orders for this visit:    1. Mixed hyperlipidemia (Primary)  -     Comprehensive Metabolic Panel; Future  -     Lipid Panel; Future  -     CBC (No Diff); Future    2. NAVNEET on CPAP  -     CBC (No Diff); Future    3. Need for vaccination  -     COVID-19 F23 (Pfizer) 12yrs+ (COMIRNATY)    4. Chronic cough  -     XR Chest PA & Lateral; Future        Follow Up:   Next Medicare Wellness visit to be scheduled in 1 year.      An After Visit Summary and PPPS were made available to the patient.

## 2023-10-30 ENCOUNTER — HOSPITAL ENCOUNTER (OUTPATIENT)
Dept: GENERAL RADIOLOGY | Facility: HOSPITAL | Age: 73
Discharge: HOME | End: 2023-10-30
Payer: MEDICARE

## 2023-10-30 ENCOUNTER — LAB (OUTPATIENT)
Dept: LAB | Facility: HOSPITAL | Age: 73
End: 2023-10-30
Payer: MEDICARE

## 2023-10-30 DIAGNOSIS — R05.3 CHRONIC COUGH: ICD-10-CM

## 2023-10-30 DIAGNOSIS — E78.2 MIXED HYPERLIPIDEMIA: ICD-10-CM

## 2023-10-30 DIAGNOSIS — G47.33 OSA ON CPAP: ICD-10-CM

## 2023-10-30 LAB
ALBUMIN SERPL-MCNC: 4.3 G/DL (ref 3.5–5.2)
ALBUMIN/GLOB SERPL: 1.5 G/DL
ALP SERPL-CCNC: 64 U/L (ref 39–117)
ALT SERPL W P-5'-P-CCNC: 14 U/L (ref 1–41)
ANION GAP SERPL CALCULATED.3IONS-SCNC: 8 MMOL/L (ref 5–15)
AST SERPL-CCNC: 18 U/L (ref 1–40)
BILIRUB SERPL-MCNC: 0.3 MG/DL (ref 0–1.2)
BUN SERPL-MCNC: 20 MG/DL (ref 8–23)
BUN/CREAT SERPL: 16.9 (ref 7–25)
CALCIUM SPEC-SCNC: 8.8 MG/DL (ref 8.6–10.5)
CHLORIDE SERPL-SCNC: 107 MMOL/L (ref 98–107)
CHOLEST SERPL-MCNC: 150 MG/DL (ref 0–200)
CO2 SERPL-SCNC: 27 MMOL/L (ref 22–29)
CREAT SERPL-MCNC: 1.18 MG/DL (ref 0.76–1.27)
DEPRECATED RDW RBC AUTO: 47.3 FL (ref 37–54)
EGFRCR SERPLBLD CKD-EPI 2021: 65.6 ML/MIN/1.73
ERYTHROCYTE [DISTWIDTH] IN BLOOD BY AUTOMATED COUNT: 13.4 % (ref 12.3–15.4)
GLOBULIN UR ELPH-MCNC: 2.8 GM/DL
GLUCOSE SERPL-MCNC: 106 MG/DL (ref 65–99)
HCT VFR BLD AUTO: 40.4 % (ref 37.5–51)
HDLC SERPL-MCNC: 55 MG/DL (ref 40–60)
HGB BLD-MCNC: 12.4 G/DL (ref 13–17.7)
LDLC SERPL CALC-MCNC: 79 MG/DL (ref 0–100)
LDLC/HDLC SERPL: 1.42 {RATIO}
MCH RBC QN AUTO: 29.5 PG (ref 26.6–33)
MCHC RBC AUTO-ENTMCNC: 30.7 G/DL (ref 31.5–35.7)
MCV RBC AUTO: 96 FL (ref 79–97)
PLATELET # BLD AUTO: 195 10*3/MM3 (ref 140–450)
PMV BLD AUTO: 11 FL (ref 6–12)
POTASSIUM SERPL-SCNC: 4.6 MMOL/L (ref 3.5–5.2)
PROT SERPL-MCNC: 7.1 G/DL (ref 6–8.5)
RBC # BLD AUTO: 4.21 10*6/MM3 (ref 4.14–5.8)
SODIUM SERPL-SCNC: 142 MMOL/L (ref 136–145)
TRIGL SERPL-MCNC: 85 MG/DL (ref 0–150)
VLDLC SERPL-MCNC: 16 MG/DL (ref 5–40)
WBC NRBC COR # BLD: 13.52 10*3/MM3 (ref 3.4–10.8)

## 2023-10-30 PROCEDURE — 71046 X-RAY EXAM CHEST 2 VIEWS: CPT

## 2023-10-30 PROCEDURE — 80061 LIPID PANEL: CPT

## 2023-10-30 PROCEDURE — 85027 COMPLETE CBC AUTOMATED: CPT

## 2023-10-30 PROCEDURE — 85007 BL SMEAR W/DIFF WBC COUNT: CPT

## 2023-10-30 PROCEDURE — 80053 COMPREHEN METABOLIC PANEL: CPT

## 2023-10-30 PROCEDURE — 36415 COLL VENOUS BLD VENIPUNCTURE: CPT

## 2023-10-31 LAB
BURR CELLS BLD QL SMEAR: ABNORMAL
LYMPHOCYTES # BLD MANUAL: 3.79 10*3/MM3 (ref 0.7–3.1)
LYMPHOCYTES NFR BLD MANUAL: 4.3 % (ref 5–12)
METAMYELOCYTES NFR BLD MANUAL: 2.2 % (ref 0–0)
MONOCYTES # BLD: 0.58 10*3/MM3 (ref 0.1–0.9)
MYELOCYTES NFR BLD MANUAL: 1.1 % (ref 0–0)
NEUTROPHILS # BLD AUTO: 8.72 10*3/MM3 (ref 1.7–7)
NEUTROPHILS NFR BLD MANUAL: 45.2 % (ref 42.7–76)
NEUTS BAND NFR BLD MANUAL: 19.4 % (ref 0–5)
PLAT MORPH BLD: NORMAL
POIKILOCYTOSIS BLD QL SMEAR: ABNORMAL
VARIANT LYMPHS NFR BLD MANUAL: 19.4 % (ref 19.6–45.3)
VARIANT LYMPHS NFR BLD MANUAL: 8.6 % (ref 0–5)
WBC MORPH BLD: NORMAL

## 2023-11-06 ENCOUNTER — LAB (OUTPATIENT)
Dept: LAB | Facility: HOSPITAL | Age: 73
End: 2023-11-06
Payer: MEDICARE

## 2023-11-06 DIAGNOSIS — D72.828 OTHER ELEVATED WHITE BLOOD CELL (WBC) COUNT: ICD-10-CM

## 2023-11-06 DIAGNOSIS — D72.820 ATYPICAL LYMPHOCYTOSIS: ICD-10-CM

## 2023-11-06 DIAGNOSIS — R71.8 POIKILOCYTOSIS: ICD-10-CM

## 2023-11-06 LAB
BACTERIA UR QL AUTO: ABNORMAL /HPF
BASOPHILS # BLD MANUAL: 0.13 10*3/MM3 (ref 0–0.2)
BASOPHILS NFR BLD MANUAL: 2 % (ref 0–1.5)
BILIRUB UR QL STRIP: NEGATIVE
CLARITY UR: CLEAR
COLOR UR: YELLOW
CYTOLOGIST CVX/VAG CYTO: NORMAL
DEPRECATED RDW RBC AUTO: 47 FL (ref 37–54)
EOSINOPHIL # BLD MANUAL: 0.26 10*3/MM3 (ref 0–0.4)
EOSINOPHIL NFR BLD MANUAL: 4 % (ref 0.3–6.2)
ERYTHROCYTE [DISTWIDTH] IN BLOOD BY AUTOMATED COUNT: 13.2 % (ref 12.3–15.4)
GIANT PLATELETS: ABNORMAL
GLUCOSE UR STRIP-MCNC: NEGATIVE MG/DL
HCT VFR BLD AUTO: 39 % (ref 37.5–51)
HGB BLD-MCNC: 12.1 G/DL (ref 13–17.7)
HGB UR QL STRIP.AUTO: ABNORMAL
HYALINE CASTS UR QL AUTO: ABNORMAL /LPF
KETONES UR QL STRIP: NEGATIVE
LEUKOCYTE ESTERASE UR QL STRIP.AUTO: ABNORMAL
LYMPHOCYTES # BLD MANUAL: 2.64 10*3/MM3 (ref 0.7–3.1)
LYMPHOCYTES NFR BLD MANUAL: 4 % (ref 5–12)
MCH RBC QN AUTO: 29.7 PG (ref 26.6–33)
MCHC RBC AUTO-ENTMCNC: 31 G/DL (ref 31.5–35.7)
MCV RBC AUTO: 95.6 FL (ref 79–97)
MONOCYTES # BLD: 0.26 10*3/MM3 (ref 0.1–0.9)
NEUTROPHILS # BLD AUTO: 3.16 10*3/MM3 (ref 1.7–7)
NEUTROPHILS NFR BLD MANUAL: 48 % (ref 42.7–76)
NEUTS BAND NFR BLD MANUAL: 1 % (ref 0–5)
NITRITE UR QL STRIP: POSITIVE
OVALOCYTES BLD QL SMEAR: ABNORMAL
PATH INTERP BLD-IMP: NORMAL
PH UR STRIP.AUTO: 7.5 [PH] (ref 5–8)
PLATELET # BLD AUTO: 237 10*3/MM3 (ref 140–450)
PMV BLD AUTO: 9.8 FL (ref 6–12)
POIKILOCYTOSIS BLD QL SMEAR: ABNORMAL
PROT UR QL STRIP: ABNORMAL
RBC # BLD AUTO: 4.08 10*6/MM3 (ref 4.14–5.8)
RBC # UR STRIP: ABNORMAL /HPF
REF LAB TEST METHOD: ABNORMAL
SP GR UR STRIP: 1.02 (ref 1–1.03)
SQUAMOUS #/AREA URNS HPF: ABNORMAL /HPF
UROBILINOGEN UR QL STRIP: ABNORMAL
VARIANT LYMPHS NFR BLD MANUAL: 37 % (ref 19.6–45.3)
VARIANT LYMPHS NFR BLD MANUAL: 4 % (ref 0–5)
WBC # UR STRIP: ABNORMAL /HPF
WBC CLUMPS # UR AUTO: ABNORMAL /HPF
WBC MORPH BLD: NORMAL
WBC NRBC COR # BLD: 6.45 10*3/MM3 (ref 3.4–10.8)

## 2023-11-06 PROCEDURE — 86644 CMV ANTIBODY: CPT

## 2023-11-06 PROCEDURE — 87186 SC STD MICRODIL/AGAR DIL: CPT

## 2023-11-06 PROCEDURE — 36415 COLL VENOUS BLD VENIPUNCTURE: CPT

## 2023-11-06 PROCEDURE — 86645 CMV ANTIBODY IGM: CPT

## 2023-11-06 PROCEDURE — 86665 EPSTEIN-BARR CAPSID VCA: CPT

## 2023-11-06 PROCEDURE — 85007 BL SMEAR W/DIFF WBC COUNT: CPT

## 2023-11-06 PROCEDURE — 81001 URINALYSIS AUTO W/SCOPE: CPT

## 2023-11-06 PROCEDURE — 86664 EPSTEIN-BARR NUCLEAR ANTIGEN: CPT

## 2023-11-06 PROCEDURE — 87086 URINE CULTURE/COLONY COUNT: CPT

## 2023-11-06 PROCEDURE — 87077 CULTURE AEROBIC IDENTIFY: CPT

## 2023-11-06 PROCEDURE — 85060 BLOOD SMEAR INTERPRETATION: CPT

## 2023-11-06 PROCEDURE — 85025 COMPLETE CBC W/AUTO DIFF WBC: CPT

## 2023-11-07 LAB
CMV IGG SERPL IA-ACNC: <0.6 U/ML (ref 0–0.59)
CMV IGM SERPL IA-ACNC: <30 AU/ML (ref 0–29.9)
EBV NA IGG SER IA-ACNC: 305 U/ML (ref 0–17.9)
EBV VCA IGG SER IA-ACNC: >600 U/ML (ref 0–17.9)
EBV VCA IGM SER IA-ACNC: <36 U/ML (ref 0–35.9)
SERVICE CMNT-IMP: ABNORMAL

## 2023-11-08 LAB — BACTERIA SPEC AEROBE CULT: ABNORMAL

## 2023-11-22 ENCOUNTER — HOSPITAL ENCOUNTER (OUTPATIENT)
Dept: PULMONOLOGY | Facility: HOSPITAL | Age: 73
Discharge: HOME | End: 2023-11-22
Admitting: INTERNAL MEDICINE
Payer: MEDICARE

## 2023-11-22 DIAGNOSIS — R05.3 CHRONIC COUGH: ICD-10-CM

## 2023-11-22 DIAGNOSIS — R93.89 ABNORMAL CHEST X-RAY: ICD-10-CM

## 2023-11-22 PROCEDURE — 94729 DIFFUSING CAPACITY: CPT

## 2023-11-22 PROCEDURE — 94726 PLETHYSMOGRAPHY LUNG VOLUMES: CPT

## 2023-11-22 PROCEDURE — 94060 EVALUATION OF WHEEZING: CPT

## 2023-11-22 RX ORDER — ALBUTEROL SULFATE 2.5 MG/3ML
2.5 SOLUTION RESPIRATORY (INHALATION) ONCE
Status: COMPLETED | OUTPATIENT
Start: 2023-11-22 | End: 2023-11-22

## 2023-11-22 RX ADMIN — ALBUTEROL SULFATE 2.5 MG: 2.5 SOLUTION RESPIRATORY (INHALATION) at 07:42

## 2024-07-03 ENCOUNTER — OFFICE VISIT (OUTPATIENT)
Dept: INTERNAL MEDICINE | Facility: CLINIC | Age: 74
End: 2024-07-03
Payer: MEDICARE

## 2024-07-03 VITALS
DIASTOLIC BLOOD PRESSURE: 80 MMHG | OXYGEN SATURATION: 98 % | WEIGHT: 205.2 LBS | HEART RATE: 71 BPM | HEIGHT: 71 IN | SYSTOLIC BLOOD PRESSURE: 152 MMHG | BODY MASS INDEX: 28.73 KG/M2 | RESPIRATION RATE: 16 BRPM

## 2024-07-03 DIAGNOSIS — M72.2 PLANTAR FASCIITIS OF RIGHT FOOT: Primary | ICD-10-CM

## 2024-07-03 DIAGNOSIS — M54.2 CHRONIC NECK AND BACK PAIN: ICD-10-CM

## 2024-07-03 DIAGNOSIS — M54.9 CHRONIC NECK AND BACK PAIN: ICD-10-CM

## 2024-07-03 DIAGNOSIS — F41.1 GENERALIZED ANXIETY DISORDER: ICD-10-CM

## 2024-07-03 DIAGNOSIS — M21.611 BUNION OF RIGHT FOOT: ICD-10-CM

## 2024-07-03 DIAGNOSIS — G89.29 CHRONIC NECK AND BACK PAIN: ICD-10-CM

## 2024-07-03 DIAGNOSIS — Z86.73 HISTORY OF TIA (TRANSIENT ISCHEMIC ATTACK): ICD-10-CM

## 2024-07-03 PROCEDURE — 99214 OFFICE O/P EST MOD 30 MIN: CPT | Performed by: INTERNAL MEDICINE

## 2024-07-03 PROCEDURE — 1159F MED LIST DOCD IN RCRD: CPT | Performed by: INTERNAL MEDICINE

## 2024-07-03 PROCEDURE — 1160F RVW MEDS BY RX/DR IN RCRD: CPT | Performed by: INTERNAL MEDICINE

## 2024-07-03 PROCEDURE — G2211 COMPLEX E/M VISIT ADD ON: HCPCS | Performed by: INTERNAL MEDICINE

## 2024-07-03 RX ORDER — SULFAMETHOXAZOLE AND TRIMETHOPRIM 800; 160 MG/1; MG/1
1 TABLET ORAL 2 TIMES DAILY
COMMUNITY
Start: 2024-06-27

## 2024-07-03 RX ORDER — ROSUVASTATIN CALCIUM 5 MG/1
5 TABLET, COATED ORAL DAILY
Qty: 30 TABLET | Refills: 0 | Status: SHIPPED | OUTPATIENT
Start: 2024-07-03

## 2024-07-03 RX ORDER — PREDNISONE 10 MG/1
10 TABLET ORAL DAILY
COMMUNITY
Start: 2024-06-27

## 2024-07-03 RX ORDER — COVID-19 ANTIGEN TEST
2 KIT MISCELLANEOUS NIGHTLY
COMMUNITY

## 2024-07-03 RX ORDER — ESCITALOPRAM OXALATE 5 MG/1
5 TABLET ORAL DAILY
Qty: 30 TABLET | Refills: 1 | Status: SHIPPED | OUTPATIENT
Start: 2024-07-03

## 2024-07-03 NOTE — PROGRESS NOTES
CC: foot pain and short-tempered    History:  Rasheed Arevalo is a 73 y.o. male   History of Present Illness  The patient presents for evaluation of multiple medical concerns.    The patient suspects he has plantar fasciitis in his foot, which occasionally causes discomfort when pressure is applied to his toes. He has observed a V between the second and third toes, which could potentially be a plantar ligament tear. Additionally, he observes that his big toe is migrated to the top of his little toe, leading to a tendency to develop an ingrown toenail. He recalls that his big dog stepped on his toe, which subsequently cuts it. He recalls undergoing treatment for this toe during his high school years. His occupation involves prolonged standing, and he has been utilizing a wraparound device to pull his big toe over at night while watching television. He also reports a scar from a previous impact spur removal performed by Dr. Nelson. He is currently on steroids, which have improved his back pain. He has a history of plantar fasciitis, which typically resolves with gym workouts. However, prolonged standing exacerbates his foot pain, particularly when wearing slip-on work shoes with harder rubber. He suspects the presence of arthritis in his toe. He uses a wraparound boot with a flat metal piece, but is uncertain of its efficacy or discomfort. He maintains an active lifestyle.    The patient uses a CPAP machine on his side, but is unable to sleep on his back due to neck, back, left shoulder, and hip pain. He has been taking 12-hour Aleve before bedtime, which provides temporary relief in the morning. He has observed that his arthritis spikes when he consumes coffee with sugar, a glass of sweet tea or Coke during the day, which he does not consume frequently. He experiences pain in his hands and knuckles upon waking. He uses a neck stretcher for 20 minutes daily at night while watching television, but his symptoms have  gradually worsened. He suspects his symptoms are related to arthritic or stenosis. He has used Voltaren gel on his shoulders, which provided relief.    The patient has a history of bilateral stenosis in his neck and bulging disks in his neck and lower back. He did not consult a surgeon at that time. In 2015, he experienced severe back pain, which led to dragging his left leg after standing for 30 seconds. He underwent care at [facility], which improved his leg pain. Over the years, he attended chiropractic maintenance and used a stretching machine, which he found beneficial. He discontinued this treatment 2 to 3 years ago. His lower back pain has worsened, but his neck pain has significantly worsened. He experiences cramping in his neck muscles when yawning and stretching. He also performs core exercises at the gym.    He feels his mood has been more snappy recently. He was on medication in his 40s, but he cannot recall what this was.  However, it was helpful.        ROS:  Review of Systems   Respiratory:  Negative for shortness of breath.    Cardiovascular:  Negative for chest pain.   Musculoskeletal:  Positive for arthralgias, back pain, myalgias and neck pain.   Psychiatric/Behavioral:  Negative for dysphoric mood. The patient is nervous/anxious.         reports that he quit smoking about 54 years ago. His smoking use included cigarettes. He started smoking about 58 years ago. He has a 6 pack-year smoking history. He has been exposed to tobacco smoke. He has never used smokeless tobacco. He reports that he does not drink alcohol and does not use drugs.      Current Outpatient Medications:     multivitamin with minerals tablet tablet, Take 1 tablet by mouth Daily., Disp: , Rfl:     Naproxen Sodium (Aleve) 220 MG capsule, Take 2 tablets by mouth Every Night., Disp: , Rfl:     oxymetazoline (AFRIN) 0.05 % nasal spray, 2 sprays into the nostril(s) as directed by provider Every Night., Disp: , Rfl:     predniSONE  "(DELTASONE) 10 MG tablet, Take 1 tablet by mouth Daily., Disp: , Rfl:     sulfamethoxazole-trimethoprim (BACTRIM DS,SEPTRA DS) 800-160 MG per tablet, Take 1 tablet by mouth 2 (Two) Times a Day., Disp: , Rfl:     OBJECTIVE:  /80 (BP Location: Left arm, Patient Position: Sitting, Cuff Size: Adult)   Pulse 71   Resp 16   Ht 180.3 cm (71\")   Wt 93.1 kg (205 lb 3.2 oz)   SpO2 98%   BMI 28.62 kg/m²    Physical Exam  Musculoskeletal:      Right foot: Bunion present.      Left foot: No bunion.   Feet:      Right foot:      Skin integrity: No skin breakdown or erythema.      Left foot:      Skin integrity: No skin breakdown or erythema.      Comments: There is a mild bunion of the right great toe.  He has no skin breakdown or erythematous areas consistent with increased friction.  He does have pain on palpation just distal to the calcaneus on the plantar surface.  He also has some tenderness of the flexor tendons of the second and third toes on the right foot when those toes are flexed.  There is no calcification palpated in the tendons themselves.          Assessment/Plan     Diagnoses and all orders for this visit:    1. Plantar fasciitis of right foot (Primary)  2. Bunion of right foot  Discussed podiatry referral, but he does not feel it is bad enough at this time.  Stretches are given with AVS and he is recommended to ice his feet.  He may also use Voltaren gel over-the-counter.    3. History of TIA (transient ischemic attack)  -     rosuvastatin (Crestor) 5 MG tablet; Take 1 tablet by mouth Daily.  Dispense: 30 tablet; Refill: 0  He had myalgias with atorvastatin, so we will change to low-dose rosuvastatin and increase the dose as tolerated.    4. Generalized anxiety disorder  -     escitalopram (LEXAPRO) 5 MG tablet; Take 1 tablet by mouth Daily.  Dispense: 30 tablet; Refill: 1  I advised that it will take 3-4 weeks to see some effect and 6-8 weeks to see full effect.  If the dose is ineffective or " suboptimal, the patient should notify the clinic for a consideration of a dose increase. The patient expressed understanding.    5. Chronic neck and back pain  He has had chronic issues with these and had MRI back in 2021.  He is on a steroid now and is feeling better overall, but when he comes off if he has any persistent pain that he cannot tolerate and would like further workup on, we could consider further imaging, physical therapy, and/or surgical evaluation.    An After Visit Summary was printed and given to the patient at discharge.  Return for Next scheduled follow up.      Patient or patient representative verbalized consent for the use of Ambient Listening during the visit with  Wild Alatorre DO for chart documentation. 7/3/2024  09:19 CDT    Wild Alatorre D.O. 7/3/2024   Electronically signed.

## 2024-08-03 DIAGNOSIS — F41.1 GENERALIZED ANXIETY DISORDER: ICD-10-CM

## 2024-08-03 DIAGNOSIS — Z86.73 HISTORY OF TIA (TRANSIENT ISCHEMIC ATTACK): ICD-10-CM

## 2024-08-05 RX ORDER — ESCITALOPRAM OXALATE 5 MG/1
5 TABLET ORAL DAILY
Qty: 30 TABLET | Refills: 5 | Status: SHIPPED | OUTPATIENT
Start: 2024-08-05

## 2024-08-05 RX ORDER — ROSUVASTATIN CALCIUM 5 MG/1
5 TABLET, COATED ORAL DAILY
Qty: 30 TABLET | Refills: 5 | Status: SHIPPED | OUTPATIENT
Start: 2024-08-05
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
## 2024-10-02 DIAGNOSIS — F41.1 GENERALIZED ANXIETY DISORDER: ICD-10-CM

## 2024-10-02 RX ORDER — ESCITALOPRAM OXALATE 10 MG/1
10 TABLET ORAL DAILY
Qty: 90 TABLET | Refills: 1 | Status: SHIPPED | OUTPATIENT
Start: 2024-10-02

## 2024-10-09 ENCOUNTER — HOSPITAL ENCOUNTER (OUTPATIENT)
Dept: ULTRASOUND IMAGING | Facility: HOSPITAL | Age: 74
Discharge: HOME | End: 2024-10-09
Admitting: UROLOGY
Payer: MEDICARE

## 2024-10-09 DIAGNOSIS — N28.1 RENAL CYSTS, ACQUIRED, BILATERAL: ICD-10-CM

## 2024-10-09 PROCEDURE — 76775 US EXAM ABDO BACK WALL LIM: CPT

## 2024-10-17 ENCOUNTER — OFFICE VISIT (OUTPATIENT)
Dept: UROLOGY | Facility: CLINIC | Age: 74
End: 2024-10-17
Payer: MEDICARE

## 2024-10-17 VITALS — BODY MASS INDEX: 30.1 KG/M2 | HEIGHT: 71 IN | TEMPERATURE: 98.4 F | WEIGHT: 215 LBS

## 2024-10-17 DIAGNOSIS — N28.1 RENAL CYSTS, ACQUIRED, BILATERAL: Primary | ICD-10-CM

## 2024-10-17 DIAGNOSIS — N28.89 LEFT RENAL MASS: ICD-10-CM

## 2024-10-17 LAB
BILIRUB BLD-MCNC: NEGATIVE MG/DL
CLARITY, POC: CLEAR
COLOR UR: YELLOW
GLUCOSE UR STRIP-MCNC: NEGATIVE MG/DL
KETONES UR QL: NEGATIVE
LEUKOCYTE EST, POC: NEGATIVE
NITRITE UR-MCNC: NEGATIVE MG/ML
PH UR: 7 [PH] (ref 5–8)
PROT UR STRIP-MCNC: ABNORMAL MG/DL
RBC # UR STRIP: NEGATIVE /UL
SP GR UR: 1.02 (ref 1–1.03)
UROBILINOGEN UR QL: ABNORMAL

## 2024-10-31 ENCOUNTER — HOSPITAL ENCOUNTER (OUTPATIENT)
Dept: CT IMAGING | Facility: HOSPITAL | Age: 74
Discharge: HOME | End: 2024-10-31
Admitting: PHYSICIAN ASSISTANT
Payer: MEDICARE

## 2024-10-31 ENCOUNTER — OFFICE VISIT (OUTPATIENT)
Dept: INTERNAL MEDICINE | Facility: CLINIC | Age: 74
End: 2024-10-31
Payer: MEDICARE

## 2024-10-31 VITALS
BODY MASS INDEX: 31.43 KG/M2 | RESPIRATION RATE: 16 BRPM | WEIGHT: 224.5 LBS | OXYGEN SATURATION: 98 % | DIASTOLIC BLOOD PRESSURE: 78 MMHG | SYSTOLIC BLOOD PRESSURE: 137 MMHG | HEART RATE: 65 BPM | HEIGHT: 71 IN

## 2024-10-31 DIAGNOSIS — E66.811 CLASS 1 OBESITY DUE TO EXCESS CALORIES WITH SERIOUS COMORBIDITY AND BODY MASS INDEX (BMI) OF 31.0 TO 31.9 IN ADULT: ICD-10-CM

## 2024-10-31 DIAGNOSIS — N28.1 RENAL CYSTS, ACQUIRED, BILATERAL: ICD-10-CM

## 2024-10-31 DIAGNOSIS — E66.09 CLASS 1 OBESITY DUE TO EXCESS CALORIES WITH SERIOUS COMORBIDITY AND BODY MASS INDEX (BMI) OF 31.0 TO 31.9 IN ADULT: ICD-10-CM

## 2024-10-31 DIAGNOSIS — E78.2 MIXED HYPERLIPIDEMIA: ICD-10-CM

## 2024-10-31 DIAGNOSIS — Z00.01 ANNUAL VISIT FOR GENERAL ADULT MEDICAL EXAMINATION WITH ABNORMAL FINDINGS: ICD-10-CM

## 2024-10-31 DIAGNOSIS — Z23 NEED FOR VACCINATION: Primary | ICD-10-CM

## 2024-10-31 DIAGNOSIS — G47.33 OSA ON CPAP: ICD-10-CM

## 2024-10-31 DIAGNOSIS — N28.89 LEFT RENAL MASS: ICD-10-CM

## 2024-10-31 PROCEDURE — 74178 CT ABD&PLV WO CNTR FLWD CNTR: CPT

## 2024-10-31 PROCEDURE — 25510000001 IOPAMIDOL 61 % SOLUTION: Performed by: PHYSICIAN ASSISTANT

## 2024-10-31 RX ORDER — IOPAMIDOL 612 MG/ML
100 INJECTION, SOLUTION INTRAVASCULAR
Status: COMPLETED | OUTPATIENT
Start: 2024-10-31 | End: 2024-10-31

## 2024-10-31 RX ADMIN — IOPAMIDOL 100 ML: 612 INJECTION, SOLUTION INTRAVENOUS at 08:28

## 2024-10-31 NOTE — PATIENT INSTRUCTIONS
Medicare Wellness  Personal Prevention Plan of Service     Date of Office Visit:    Encounter Provider:  Wild Alatorre DO  Place of Service:  [facility]  Patient Name: Rasheed Arevalo  :  1950    As part of the Medicare Wellness portion of your visit today, we are providing you with this personalized preventive plan of services (PPPS). This plan is based upon recommendations of the [place] Preventive Services Task Force (USPSTF) and the Advisory Committee on Immunization Practices (ACIP).    This lists the preventive care services that should be considered, and provides dates of when you are due. Items listed as completed are up-to-date and do not require any further intervention.    Health Maintenance   Topic Date Due    COVID-19 Vaccine (2023- season) 2024    BMI FOLLOWUP  10/26/2024    LIPID PANEL  10/30/2024    ANNUAL WELLNESS VISIT  10/31/2025    COLORECTAL CANCER SCREENING  2025    TDAP/TD VACCINES (3 - Td or Tdap) 2027    HEPATITIS C SCREENING  Completed    INFLUENZA VACCINE  Completed    Pneumococcal Vaccine 65+  Completed    AAA SCREEN (ONE-TIME)  Completed    ZOSTER VACCINE  Completed       Orders Placed This Encounter   Procedures    Miscellaneous DME     Machine at end of life per messages    [facility]     Order Specific Question:   Type of DME     Answer:   New CPAP machine with mask and tubes     Order Specific Question:   Length of Need     Answer:   99 Months = Lifetime    Fluzone High-Dose 65+yrs (5341-1139)    Comprehensive Metabolic Panel     Standing Status:   Future     Standing Expiration Date:   10/31/2025     Order Specific Question:   Release to patient     Answer:   Routine Release [[id number]]    Lipid Panel     Standing Status:   Future     Standing Expiration Date:   10/31/2025     Order Specific Question:   Release to patient     Answer:   Routine Release [[id number]]    CBC (No Diff)     Standing Status:    Future     Standing Expiration Date:   10/31/2025     Order Specific Question:   Release to patient     Answer:   Routine Release [[id number]]       Return in about 6 months (around 4/30/2025) for Recheck.

## 2024-10-31 NOTE — PROGRESS NOTES
Subjective   The ABCs of the Annual Wellness Visit  Medicare Wellness Visit      Rasheed Arevalo is a 74 y.o. patient who presents for a Medicare Wellness Visit.    The following portions of the patient's history were reviewed and   updated as appropriate: allergies, current medications, past family history, past medical history, past social history, past surgical history, and problem list.    Compared to one year ago, the patient's physical   health is the same.  Compared to one year ago, the patient's mental   health is the same.    Recent Hospitalizations:  He was not admitted to the hospital during the last year.     Current Medical Providers:  Patient Care Team:  Wild Alatorre DO as PCP - General (Internal Medicine)  Shaye Acevedo APRN (Inactive) as Nurse Practitioner (Otolaryngology)  Gavin Kitchen MD as Consulting Physician (Otolaryngology)    Outpatient Medications Prior to Visit   Medication Sig Dispense Refill    escitalopram (LEXAPRO) 10 MG tablet Take 1 tablet by mouth Daily. 90 tablet 1    multivitamin with minerals tablet tablet Take 1 tablet by mouth Daily.      Naproxen Sodium (Aleve) 220 MG capsule Take 2 tablets by mouth Every Night.      oxymetazoline (AFRIN) 0.05 % nasal spray Administer 2 sprays into the nostril(s) as directed by provider Every Night.      rosuvastatin (CRESTOR) 5 MG tablet Take 1 tablet by mouth Daily. 90 tablet 3    predniSONE (DELTASONE) 10 MG tablet Take 1 tablet by mouth Daily.      sulfamethoxazole-trimethoprim (BACTRIM DS,SEPTRA DS) 800-160 MG per tablet Take 1 tablet by mouth 2 (Two) Times a Day.       No facility-administered medications prior to visit.     No opioid medication identified on active medication list. I have reviewed chart for other potential  high risk medication/s and harmful drug interactions in the elderly.      Aspirin is not on active medication list.  Aspirin use is not indicated based on review of current medical condition/s. Risk of  "harm outweighs potential benefits.  .    Patient Active Problem List   Diagnosis    Ataxia    Mixed hyperlipidemia    Renal cysts, acquired, bilateral    Benign non-nodular prostatic hyperplasia without lower urinary tract symptoms    Chronic non-seasonal allergic rhinitis    History of kidney stones    Class 1 obesity due to excess calories with serious comorbidity and body mass index (BMI) of 31.0 to 31.9 in adult    Supraventricular tachycardia    NAVNEET on CPAP    Bilateral sensorineural hearing loss    Chronic neck and back pain    BPH with urinary obstruction     Advance Care Planning Advance Directive is on file.  ACP discussion was held with the patient during this visit. Patient has an advance directive in EMR which is still valid.             Objective   Vitals:    10/31/24 1355   BP: 137/78   BP Location: Left arm   Patient Position: Sitting   Cuff Size: Adult   Pulse: 65   Resp: 16   SpO2: 98%   Weight: 102 kg (224 lb 8 oz)   Height: 180.3 cm (71\")       Estimated body mass index is 31.31 kg/m² as calculated from the following:    Height as of this encounter: 180.3 cm (71\").    Weight as of this encounter: 102 kg (224 lb 8 oz).    BMI is >= 30 and <35. (Class 1 Obesity). The following options were offered after discussion;: exercise counseling/recommendations and nutrition counseling/recommendations       Does the patient have evidence of cognitive impairment? No                                                                                                Health  Risk Assessment    Smoking Status:  Social History     Tobacco Use   Smoking Status Former    Current packs/day: 0.00    Average packs/day: 1 pack/day for 6.0 years (6.0 ttl pk-yrs)    Types: Cigarettes    Start date: 1966    Quit date: 1970    Years since quittin.8    Passive exposure: Past   Smokeless Tobacco Never   Tobacco Comments    Smoked cigarettes when young.  Quit in college.  Never since.     Alcohol Consumption:  Social " History     Substance and Sexual Activity   Alcohol Use No       Fall Risk Screen  STEADI Fall Risk Assessment was completed, and patient is at LOW risk for falls.Assessment completed on:10/31/2024    Depression Screening:      10/31/2024     1:59 PM   PHQ-2/PHQ-9 Depression Screening   Little interest or pleasure in doing things Not at all   Feeling down, depressed, or hopeless Not at all     Health Habits and Functional and Cognitive Screening:      10/31/2024    12:02 PM   Functional & Cognitive Status   Do you have difficulty preparing food and eating? No    Do you have difficulty bathing yourself, getting dressed or grooming yourself? No    Do you have difficulty using the toilet? No    Do you have difficulty moving around from place to place? No    Do you have trouble with steps or getting out of a bed or a chair? No    Current Diet Well Balanced Diet    Dental Exam Up to date    Eye Exam Up to date    Exercise (times per week) 3 times per week    Current Exercises Include Aerobics;Light Weights;Treadmill;Weightlifting    Do you need help using the phone?  No    Are you deaf or do you have serious difficulty hearing?  No    Do you need help to go to places out of walking distance? No    Do you need help shopping? No    Do you need help preparing meals?  No    Do you need help with housework?  No    Do you need help with laundry? No    Do you need help taking your medications? No    Do you need help managing money? No    Do you ever drive or ride in a car without wearing a seat belt? No    Have you felt unusual stress, anger or loneliness in the last month? No    Who do you live with? Spouse    If you need help, do you have trouble finding someone available to you? No    Have you been bothered in the last four weeks by sexual problems? No    Do you have difficulty concentrating, remembering or making decisions? No        Patient-reported           Age-appropriate Screening Schedule:  Refer to the list below for  future screening recommendations based on patient's age, sex and/or medical conditions. Orders for these recommended tests are listed in the plan section. The patient has been provided with a written plan.    Health Maintenance List  Health Maintenance   Topic Date Due    COVID-19 Vaccine (6 - 2023-24 season) 09/01/2024    ANNUAL WELLNESS VISIT  10/26/2024    BMI FOLLOWUP  10/26/2024    LIPID PANEL  10/30/2024    COLORECTAL CANCER SCREENING  12/29/2025    TDAP/TD VACCINES (3 - Td or Tdap) 08/16/2027    HEPATITIS C SCREENING  Completed    INFLUENZA VACCINE  Completed    Pneumococcal Vaccine 65+  Completed    AAA SCREEN (ONE-TIME)  Completed    ZOSTER VACCINE  Completed                                                                                                                                                CMS Preventative Services Quick Reference  Risk Factors Identified During Encounter  Fall Risk-High or Moderate: Discussed Fall Prevention in the home  Immunizations Discussed/Encouraged: COVID19  Dental Screening Recommended  Vision Screening Recommended    The above risks/problems have been discussed with the patient.  Pertinent information has been shared with the patient in the After Visit Summary.  An After Visit Summary and PPPS were made available to the patient.    Follow Up:   Next Medicare Wellness visit to be scheduled in 1 year.         Additional E&M Note during same encounter follows:  Patient has additional, significant, and separately identifiable condition(s)/problem(s) that require work above and beyond the Medicare Wellness Visit     Chief Complaint  Sleep Apnea    Subjective    HPI  Efrain is also being seen today for additional medical problem/s.       The patient presents for evaluation of multiple medical concerns.    He reports an accumulation of oily, black-brown wax in his left ear, which occasionally leads to blockage and an unpleasant odor. He has been visiting an ENT specialist every  "six months for ear cleaning. He is considering the use of hearing aids as he struggles to hear in noisy environments. He has been using a solution to soften earwax but has run out of it. He has not used any solution in his right ear, which is currently blocked.    He has been using a CPAP machine since 2005 and is interested in exploring the Inspire device. His current CPAP machine is approximately 6 to 7 years old, and he has received notifications that it has exceeded its life cycle. He underwent a sleep study in 2019 and has had his CPAP pressure adjusted twice since 2005.    He mentions a cyst on his kidney, which was evaluated with a CT scan this morning. An ultrasound was previously performed, but the results were inconclusive. He reports no issues with bowel or bladder function.    He has not been able to visit the gym since April 2024 and has gained some weight, currently weighing between 215 to 217 pounds. He has been practicing intermittent fasting and plans to resume gym visits three days a week starting in November or December 2024. He monitors his weight every couple of weeks and reports that his physical health is similar to the previous year. His mental health is good, and he has not been hospitalized recently. His memory is generally good, although he occasionally struggles with recalling names. He reports no chest pain, heart palpitations, or irregular heartbeats.    He was told that his neck is progressive but not dysfunctional. If he yawns and stretches at the same time, it locks up his muscles in his neck. Sometimes it is down the middle of his neck, sometimes down one side or the other, and occasionally he feels it in his arm or fingers.          Objective   Vital Signs:  /78 (BP Location: Left arm, Patient Position: Sitting, Cuff Size: Adult)   Pulse 65   Resp 16   Ht 180.3 cm (71\")   Wt 102 kg (224 lb 8 oz)   SpO2 98%   BMI 31.31 kg/m²   Physical Exam  Constitutional:       " General: He is not in acute distress.  HENT:      Right Ear: Tympanic membrane normal.      Left Ear: Tympanic membrane normal.      Ears:      Comments: Canals initially contained nonobstructive cerumen, which is removed.   Cardiovascular:      Rate and Rhythm: Normal rate and regular rhythm.      Heart sounds: Normal heart sounds. No murmur heard.  Pulmonary:      Effort: Pulmonary effort is normal.      Breath sounds: Normal breath sounds. No wheezing.   Neurological:      Mental Status: He is alert and oriented to person, place, and time.      Gait: Gait normal.   Psychiatric:         Mood and Affect: Mood normal.         Behavior: Behavior normal.                        Assessment and Plan      Diagnoses and all orders for this visit:    1. Need for vaccination (Primary)  -     Fluzone High-Dose 65+yrs ([id number])    2. NAVNEET on CPAP  -     Miscellaneous DME    3. Class 1 obesity due to excess calories with serious comorbidity and body mass index (BMI) of 31.0 to 31.9 in adult    4. Mixed hyperlipidemia  -     Comprehensive Metabolic Panel; Future  -     Lipid Panel; Future  -     CBC (No Diff); Future    5. Annual visit for general adult medical examination with abnormal findings  -     Comprehensive Metabolic Panel; Future  -     CBC (No Diff); Future           1. Earwax Buildup.  He reports oily black and brown wax buildup in his left ear, which sometimes causes blockage and a foul smell. His ears have been cleaned during the visit with the use of a lighted curette.     2. Neck Muscle Spasms.  He experiences neck muscle spasms when yawning and stretching simultaneously, sometimes extending to his arm and fingers. He is advised to monitor the symptoms and report if they become more than he can handle.    3. Obstructive Sleep Apnea.  He has been using a CPAP machine since 2005 and inquires about the Inspire device. The Inspire device is currently indicated for those who cannot tolerate CPAP. His current CPAP  machine is past its life cycle per error messages he has seen. An order will be sent to [facility] to obtain a new CPAP machine.     4. Kidney Cysts.  He has cysts on his kidneys, which are monitored annually. A recent CT scan showed no worrisome changes compared to prior results. He will continue with annual monitoring.    5. Weight Management.  He has gained weight over the past year, currently weighing around 215-217 lbs. He plans to return to the gym three days a week starting in November or December and resume intermittent fasting. He is advised to maintain physical activity and monitor his weight periodically.    6. Hearing Loss.  He experiences difficulty hearing in noisy environments and has been advised to consider periodic hearing tests. Hearing aids are recommended to prevent isolation and reduce the risk of dementia. He is advised to visit hearing aid centers like [facility] or [employer] [facility] for testing and potential hearing aids.    8. Medication Management.  A prescription for Lexapro has been sent to [facility].      Orders Placed This Encounter   Procedures    Miscellaneous DME     Machine at end of life per messages    [facility]     Order Specific Question:   Type of DME     Answer:   New CPAP machine with mask and tubes     Order Specific Question:   Length of Need     Answer:   99 Months = Lifetime    Fluzone High-Dose 65+yrs ([id number])    Comprehensive Metabolic Panel     Standing Status:   Future     Standing Expiration Date:   10/31/2025     Order Specific Question:   Release to patient     Answer:   Routine Release [[id number]]    Lipid Panel     Standing Status:   Future     Standing Expiration Date:   10/31/2025     Order Specific Question:   Release to patient     Answer:   Routine Release [[id number]]    CBC (No Diff)     Standing Status:   Future     Standing Expiration Date:   10/31/2025     Order Specific Question:   Release to patient     Answer:   Routine Release [[id number]]              Follow Up   Return in about 6 months (around 4/30/2025) for Recheck.  Patient was given instructions and counseling regarding his condition or for health maintenance advice. Please see specific information pulled into the AVS if appropriate.  Patient or patient representative verbalized consent for the use of Ambient Listening during the visit with  Wild Alatorre DO for chart documentation. 10/31/2024  14:35 CDT

## 2024-11-01 ENCOUNTER — TELEPHONE (OUTPATIENT)
Dept: UROLOGY | Facility: CLINIC | Age: 74
End: 2024-11-01
Payer: MEDICARE

## 2024-11-01 DIAGNOSIS — N28.1 RENAL CYSTS, ACQUIRED, BILATERAL: Primary | ICD-10-CM

## 2024-11-01 NOTE — TELEPHONE ENCOUNTER
Called pt., CT results given. Pt. V/u. Appt. Made for 6 month f/u. Renal U/S order placed, pt. Advised hospital will call to schedule prior to his f/u appt.

## 2024-11-01 NOTE — TELEPHONE ENCOUNTER
----- Message from Jatinder Posada sent at 11/1/2024  9:41 AM CDT -----  Regarding: RE: ct  Can get a renal ultrasound in 6 months  ----- Message -----  From: Alexi Garcia RN  Sent: 10/31/2024   3:06 PM CDT  To: MARBIN [place]  Subject: RE: ct                                           When do you want to see him back? And does it need to be a repeat CT or just repeat renal ultrasound?  ----- Message -----  From: Jatinder Posada PA  Sent: 10/31/2024   2:45 PM CDT  To: Alexi Garcia RN  Subject: ct                                               There are no suspicious renal lesions no lesions that have the appearance of a renal cell cancer.  He does have cysts and some of the cysts are more dense however can just follow-up in the future with repeat imaging.  ----- Message -----  From: [place], Rad Results [place] [facility]  Sent: 10/31/2024   1:15 PM CDT  To: MARBIN [place]

## 2024-12-03 ENCOUNTER — LAB (OUTPATIENT)
Dept: LAB | Facility: HOSPITAL | Age: 74
End: 2024-12-03
Payer: MEDICARE

## 2024-12-03 DIAGNOSIS — E78.2 MIXED HYPERLIPIDEMIA: ICD-10-CM

## 2024-12-03 DIAGNOSIS — Z00.01 ANNUAL VISIT FOR GENERAL ADULT MEDICAL EXAMINATION WITH ABNORMAL FINDINGS: ICD-10-CM

## 2024-12-03 LAB
ALBUMIN SERPL-MCNC: 4.1 G/DL (ref 3.5–5)
ALBUMIN/GLOB SERPL: 1.3 G/DL (ref 1.1–2.5)
ALP SERPL-CCNC: 57 U/L (ref 24–120)
ALT SERPL W P-5'-P-CCNC: 23 U/L (ref 0–50)
ANION GAP SERPL CALCULATED.3IONS-SCNC: 10 MMOL/L (ref 4–13)
AST SERPL-CCNC: 30 U/L (ref 7–45)
BILIRUB SERPL-MCNC: 0.5 MG/DL (ref 0.1–1)
BUN SERPL-MCNC: 17 MG/DL (ref 5–21)
BUN/CREAT SERPL: 14.2
CALCIUM SPEC-SCNC: 8.9 MG/DL (ref 8.6–10.5)
CHLORIDE SERPL-SCNC: 102 MMOL/L (ref 98–110)
CHOLEST SERPL-MCNC: 154 MG/DL (ref 130–200)
CO2 SERPL-SCNC: 27 MMOL/L (ref 24–31)
CREAT SERPL-MCNC: 1.2 MG/DL (ref 0.5–1.4)
EGFRCR SERPLBLD CKD-EPI 2021: 63.9 ML/MIN/1.73
ERYTHROCYTE [DISTWIDTH] IN BLOOD BY AUTOMATED COUNT: 12.1 % (ref 12.3–15.4)
GLOBULIN UR ELPH-MCNC: 3.1 GM/DL
GLUCOSE SERPL-MCNC: 96 MG/DL (ref 65–99)
HCT VFR BLD AUTO: 39.4 % (ref 37.5–51)
HDLC SERPL-MCNC: 42 MG/DL
HGB BLD-MCNC: 12.8 G/DL (ref 13–17.7)
LDLC SERPL CALC-MCNC: 80 MG/DL (ref 0–99)
LDLC/HDLC SERPL: 1.77 {RATIO}
MCH RBC QN AUTO: 30.8 PG (ref 26.6–33)
MCHC RBC AUTO-ENTMCNC: 32.5 G/DL (ref 31.5–35.7)
MCV RBC AUTO: 94.9 FL (ref 79–97)
PLATELET # BLD AUTO: 240 10*3/MM3 (ref 140–450)
PMV BLD AUTO: 9.5 FL (ref 6–12)
POTASSIUM SERPL-SCNC: 4.6 MMOL/L (ref 3.5–5.3)
PROT SERPL-MCNC: 7.2 G/DL (ref 6.3–8.7)
RBC # BLD AUTO: 4.15 10*6/MM3 (ref 4.14–5.8)
SODIUM SERPL-SCNC: 139 MMOL/L (ref 135–145)
TRIGL SERPL-MCNC: 189 MG/DL (ref 0–149)
VLDLC SERPL-MCNC: 32 MG/DL (ref 5–40)
WBC NRBC COR # BLD AUTO: 5.5 10*3/MM3 (ref 3.4–10.8)

## 2024-12-03 PROCEDURE — 80061 LIPID PANEL: CPT

## 2024-12-03 PROCEDURE — 80053 COMPREHEN METABOLIC PANEL: CPT

## 2024-12-03 PROCEDURE — 36415 COLL VENOUS BLD VENIPUNCTURE: CPT

## 2024-12-03 PROCEDURE — 85027 COMPLETE CBC AUTOMATED: CPT

## 2024-12-31 DIAGNOSIS — F41.1 GENERALIZED ANXIETY DISORDER: ICD-10-CM

## 2024-12-31 RX ORDER — ESCITALOPRAM OXALATE 10 MG/1
10 TABLET ORAL DAILY
Qty: 90 TABLET | Refills: 1 | Status: SHIPPED | OUTPATIENT
Start: 2024-12-31

## 2024-12-31 NOTE — TELEPHONE ENCOUNTER
Rx Refill Note  Requested Prescriptions     Pending Prescriptions Disp Refills    escitalopram (LEXAPRO) 10 MG tablet [Pharmacy Med Name: ESCITALOPRAM OXALATE 10MG TABLET] 90 tablet 1     Sig: TAKE 1 TABLET BY MOUTH DAILY.      Last office visit with prescribing clinician: Visit date not found   Last telemedicine visit with prescribing clinician: Visit date not found   Next office visit with prescribing clinician: 4/29/2025                         Would you like a call back once the refill request has been completed: [] Yes [] No    If the office needs to give you a call back, can they leave a voicemail: [] Yes [] No    Wai Minor MA  12/31/24, 11:37 CST

## 2025-04-02 DIAGNOSIS — F41.1 GENERALIZED ANXIETY DISORDER: ICD-10-CM

## 2025-04-02 RX ORDER — ESCITALOPRAM OXALATE 10 MG/1
10 TABLET ORAL DAILY
Qty: 90 TABLET | Refills: 1 | OUTPATIENT
Start: 2025-04-02

## 2025-04-02 NOTE — TELEPHONE ENCOUNTER
Rx Refill Note  Requested Prescriptions     Pending Prescriptions Disp Refills    escitalopram (LEXAPRO) 10 MG tablet 90 tablet 1     Sig: Take 1 tablet by mouth Daily.      Last office visit with prescribing clinician: Visit date not found   Last telemedicine visit with prescribing clinician: Visit date not found   Next office visit with prescribing clinician: 4/29/2025                         Would you like a call back once the refill request has been completed: [] Yes [] No    If the office needs to give you a call back, can they leave a voicemail: [] Yes [] No    Wai Minor MA  04/02/25, 11:24 CDT

## 2025-04-15 ENCOUNTER — HOSPITAL ENCOUNTER (OUTPATIENT)
Dept: ULTRASOUND IMAGING | Facility: HOSPITAL | Age: 75
Discharge: HOME | End: 2025-04-15
Admitting: PHYSICIAN ASSISTANT
Payer: MEDICARE

## 2025-04-15 DIAGNOSIS — N28.1 RENAL CYSTS, ACQUIRED, BILATERAL: ICD-10-CM

## 2025-04-15 PROCEDURE — 76775 US EXAM ABDO BACK WALL LIM: CPT

## 2025-04-18 ENCOUNTER — RESULTS FOLLOW-UP (OUTPATIENT)
Dept: UROLOGY | Facility: CLINIC | Age: 75
End: 2025-04-18
Payer: MEDICARE

## 2025-04-18 DIAGNOSIS — N28.89 BILATERAL RENAL MASSES: Primary | ICD-10-CM

## 2025-04-18 NOTE — TELEPHONE ENCOUNTER
Called pt., no answer. Left voicemail for pt. To call back for results. Pt. Appt. Will need to be rescheduled for after CT.

## 2025-04-18 NOTE — TELEPHONE ENCOUNTER
----- Message from Jatinder Posada sent at 4/18/2025  7:32 AM CDT -----  Regarding: US  Previous renal ultrasound there are bilateral renal cysts/masses that need further evaluation I went ahead and ordered a CT renal mass protocol if he can get before his upcoming follow-up appointment   we can discuss at that time if not would rather he come in with the CT done and reschedule if needed.  ----- Message -----  From: Jemal Rad Results [place] [facility]  Sent: 4/15/2025   1:44 PM CDT  To: MARBIN [place]

## 2025-04-28 NOTE — PROGRESS NOTES
"Chief Complaint  Neck Pain and six month follow up     Subjective        Rasheed Arevalo is a 74 y.o. male who presents today for evaluation of the above problems.    History of Present Illness  The patient is a 74-year-old male who presents for six month follow up as well as follow up of chronic neck pain.    Neck pain has progressively worsened since his last visit in 10/2024. The pain is exacerbated when looking down or to the side, particularly when using his phone. He describes the sensation as \"cobwebs\" in his forearm, accompanied by pain in the fold of his arm and occasionally in his bicep. The pain migrates to different areas and is not constant. Popping and cracking sounds are noted in his neck. Limited range of motion in the neck is reported, which improves slightly after stretching. Tylenol Arthritis is taken at night to help with sleep and manage pain in his shoulders, right hip, and neck. Physical therapy was discontinued in 2022, and a neck stretcher is used nightly for 20 to 25 minutes, which he finds beneficial. An MRI conducted a few years ago revealed bilateral stenosis and bulging discs in both his neck and lower back. He prefers to avoid surgery and has sought alternative treatments since 2015. A slight weakness in his arm is noted, attributed to not attending the gym for several months. A similar episode a few years ago involved a burning sensation and weakness extending from his triceps to his fingers.    He is currently on Lexapro 10 mg, which is effective in managing irritability. Consideration is being given to increasing the dosage to 15 mg, and concerns about potential side effects, including dementia, have been expressed. Meditation is practiced, and he reports feeling calm and happy, with minimal stress.    A CPAP machine is used, and a recent switch to a new machine has been made. No issues with the new machine are reported, and he feels refreshed upon waking. No severe headaches have " "been experienced since starting CPAP therapy. An increase in headaches over the past 2 to 3 weeks is noted, localized to the forehead and temple area. No chest pain or breathing difficulties are reported. Occasional swelling in varicose veins is observed, but no other unusual swelling is noted.    Review of Systems - History obtained from the patient  General ROS: negative  Respiratory ROS: no cough, shortness of breath, or wheezing  Cardiovascular ROS: no chest pain or dyspnea on exertion  Gastrointestinal ROS: no abdominal pain, change in bowel habits, or black or bloody stools  Neurological ROS: no TIA or stroke symptoms    Objective   Vital Signs:  /76 (BP Location: Left arm, Patient Position: Sitting, Cuff Size: Other (Comment))   Pulse 66   Temp 98.2 °F (36.8 °C) (Temporal)   Resp 16   Ht 180.3 cm (71\")   Wt 101 kg (222 lb 11.2 oz)   SpO2 97%   BMI 31.06 kg/m²   Estimated body mass index is 31.06 kg/m² as calculated from the following:    Height as of this encounter: 180.3 cm (71\").    Weight as of this encounter: 101 kg (222 lb 11.2 oz).           Physical Exam  Vitals and nursing note reviewed.   Constitutional:       Appearance: Normal appearance. He is normal weight.   HENT:      Mouth/Throat:      Mouth: Mucous membranes are moist.   Cardiovascular:      Rate and Rhythm: Normal rate and regular rhythm.      Pulses: Normal pulses.      Heart sounds: Normal heart sounds. No murmur heard.     No friction rub. No gallop.   Pulmonary:      Effort: Pulmonary effort is normal. No respiratory distress.      Breath sounds: Normal breath sounds. No wheezing.   Musculoskeletal:         General: Normal range of motion.      Cervical back: Normal range of motion and neck supple.   Skin:     General: Skin is warm and dry.      Capillary Refill: Capillary refill takes less than 2 seconds.   Neurological:      General: No focal deficit present.      Mental Status: He is alert and oriented to person, place, " and time.   Psychiatric:         Mood and Affect: Mood normal.         Behavior: Behavior normal.         Thought Content: Thought content normal.         Judgment: Judgment normal.          Result Review :  The following data was reviewed by: CECELIA Romero on 04/29/2025:  CMP          12/3/2024    09:27   CMP   Glucose 96    BUN 17    Creatinine 1.20    EGFR 63.9    Sodium 139    Potassium 4.6    Chloride 102    Calcium 8.9    Total Protein 7.2    Albumin 4.1    Globulin 3.1    Total Bilirubin 0.5    Alkaline Phosphatase 57    AST (SGOT) 30    ALT (SGPT) 23    Albumin/Globulin Ratio 1.3    BUN/Creatinine Ratio 14.2    Anion Gap 10.0      CBC          12/3/2024    09:27   CBC   WBC 5.50    RBC 4.15    Hemoglobin 12.8    Hematocrit 39.4    MCV 94.9    MCH 30.8    MCHC 32.5    RDW 12.1    Platelets 240      Lipid Panel          12/3/2024    09:27   Lipid Panel   Total Cholesterol 154    Triglycerides 189    HDL Cholesterol 42    VLDL Cholesterol 32    LDL Cholesterol  80    LDL/HDL Ratio 1.77                   Assessment and Plan   Diagnoses and all orders for this visit:    1. Cervical spinal stenosis (Primary)  - Reports worsening neck pain since 10/2024, with symptoms including pain in the forearm, bicep, and occasional popping and cracking sounds.  - Using a neck stretcher nightly for 20-25 minutes, which provides some relief. Previous physical therapy did not significantly help his neck but did aid his lower back. MRI from a few years ago showed bilateral stenosis and bulging disks in both the neck and lower back.  - Prefers to avoid surgery and is not currently interested in returning to physical therapy or seeing a specialist. Muscle relaxers were discussed as a potential treatment if stiffness persists.  - Will continue with his current regimen and notify us if symptoms worsen.    2. NAVNEET on CPAP  Comments:  Adherent with therapy    - Will continue using the CPAP machine and notify us if persistently  low oxygen levels or other symptoms occur.    3. Generalized anxiety disorder  Assessment & Plan:  Psychological condition is worsening.  Medication changes per orders.  Psychological condition  will be reassessed in 6 months.    Orders:  -     escitalopram (LEXAPRO) 10 MG tablet; Take 1.5 tablets by mouth Daily.  Dispense: 270 tablet; Refill: 1    4. Mixed hyperlipidemia  Assessment & Plan:   Stable. Per previous labs. Recheck in 6 months. LDL goal less than 100.        I spent 30 minutes caring for Rasheed on this date of service. This time includes time spent by me in the following activities:preparing for the visit, reviewing tests, obtaining and/or reviewing a separately obtained history, performing a medically appropriate examination and/or evaluation , counseling and educating the patient/family/caregiver, ordering medications, tests, or procedures, documenting information in the medical record, and independently interpreting results and communicating that information with the patient/family/caregiver    Follow Up   Return in about 6 months (around 10/29/2025) for Medicare Wellness.  Patient was given instructions and counseling regarding his condition or for health maintenance advice. Please see specific information pulled into the AVS if appropriate.     I will be providing care over continum for this patient including management of both chronic and acute medical conditions     Patient or patient representative verbalized consent for the use of Ambient Listening during the visit with  CECELIA Romero for chart documentation. 4/29/2025  13:24 CDT

## 2025-04-29 ENCOUNTER — OFFICE VISIT (OUTPATIENT)
Dept: INTERNAL MEDICINE | Facility: CLINIC | Age: 75
End: 2025-04-29
Payer: MEDICARE

## 2025-04-29 VITALS
HEIGHT: 71 IN | BODY MASS INDEX: 31.18 KG/M2 | RESPIRATION RATE: 16 BRPM | TEMPERATURE: 98.2 F | OXYGEN SATURATION: 97 % | WEIGHT: 222.7 LBS | DIASTOLIC BLOOD PRESSURE: 76 MMHG | SYSTOLIC BLOOD PRESSURE: 121 MMHG | HEART RATE: 66 BPM

## 2025-04-29 DIAGNOSIS — E78.2 MIXED HYPERLIPIDEMIA: ICD-10-CM

## 2025-04-29 DIAGNOSIS — F41.1 GENERALIZED ANXIETY DISORDER: ICD-10-CM

## 2025-04-29 DIAGNOSIS — G47.33 OSA ON CPAP: ICD-10-CM

## 2025-04-29 DIAGNOSIS — M48.02 CERVICAL SPINAL STENOSIS: Primary | ICD-10-CM

## 2025-04-29 RX ORDER — ESCITALOPRAM OXALATE 10 MG/1
15 TABLET ORAL DAILY
Qty: 270 TABLET | Refills: 1 | Status: SHIPPED | OUTPATIENT
Start: 2025-04-29

## 2025-04-29 RX ORDER — ESCITALOPRAM OXALATE 10 MG/1
10 TABLET ORAL DAILY
Qty: 90 TABLET | Refills: 1 | Status: CANCELLED | OUTPATIENT
Start: 2025-04-29

## 2025-05-01 ENCOUNTER — TELEPHONE (OUTPATIENT)
Dept: UROLOGY | Facility: CLINIC | Age: 75
End: 2025-05-01
Payer: MEDICARE

## 2025-05-01 NOTE — TELEPHONE ENCOUNTER
[facility] called to ask if we could move the CT scan back because the prior authorization is still waiting. Told her that would be fine and to let me know what day it was rescheduled for in case I needed to push back the appt in our office to accommodate that.
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
## 2025-05-23 ENCOUNTER — HOSPITAL ENCOUNTER (OUTPATIENT)
Dept: CT IMAGING | Facility: HOSPITAL | Age: 75
Discharge: HOME | End: 2025-05-23
Payer: MEDICARE

## 2025-05-23 DIAGNOSIS — N28.89 BILATERAL RENAL MASSES: ICD-10-CM

## 2025-05-23 PROCEDURE — 74170 CT ABD WO CNTRST FLWD CNTRST: CPT

## 2025-05-23 PROCEDURE — 25510000001 IOPAMIDOL PER 1 ML: Performed by: PHYSICIAN ASSISTANT

## 2025-05-23 RX ORDER — IOPAMIDOL 755 MG/ML
100 INJECTION, SOLUTION INTRAVASCULAR
Status: COMPLETED | OUTPATIENT
Start: 2025-05-23 | End: 2025-05-23

## 2025-05-23 RX ADMIN — IOPAMIDOL 100 ML: 755 INJECTION, SOLUTION INTRAVENOUS at 15:20

## 2025-05-27 ENCOUNTER — OFFICE VISIT (OUTPATIENT)
Dept: UROLOGY | Facility: CLINIC | Age: 75
End: 2025-05-27
Payer: MEDICARE

## 2025-05-27 VITALS — WEIGHT: 228 LBS | BODY MASS INDEX: 31.92 KG/M2 | HEIGHT: 71 IN | TEMPERATURE: 96.3 F

## 2025-05-27 DIAGNOSIS — N28.1 RENAL CYSTS, ACQUIRED, BILATERAL: Primary | ICD-10-CM

## 2025-05-27 LAB
BILIRUB BLD-MCNC: NEGATIVE MG/DL
CLARITY, POC: CLEAR
COLOR UR: YELLOW
GLUCOSE UR STRIP-MCNC: NEGATIVE MG/DL
KETONES UR QL: NEGATIVE
LEUKOCYTE EST, POC: NEGATIVE
NITRITE UR-MCNC: NEGATIVE MG/ML
PH UR: 6.5 [PH] (ref 5–8)
PROT UR STRIP-MCNC: NEGATIVE MG/DL
RBC # UR STRIP: NEGATIVE /UL
SP GR UR: 1.02 (ref 1–1.03)
UROBILINOGEN UR QL: NORMAL

## 2025-05-27 PROCEDURE — 81003 URINALYSIS AUTO W/O SCOPE: CPT | Performed by: PHYSICIAN ASSISTANT

## 2025-05-27 PROCEDURE — 99214 OFFICE O/P EST MOD 30 MIN: CPT | Performed by: PHYSICIAN ASSISTANT

## 2025-05-27 PROCEDURE — 1160F RVW MEDS BY RX/DR IN RCRD: CPT | Performed by: PHYSICIAN ASSISTANT

## 2025-05-27 PROCEDURE — 1159F MED LIST DOCD IN RCRD: CPT | Performed by: PHYSICIAN ASSISTANT

## 2025-07-23 ENCOUNTER — EXTERNAL PBMM DATA (OUTPATIENT)
Dept: PHARMACY | Facility: OTHER | Age: 75
End: 2025-07-23
Payer: MEDICARE

## 2025-07-29 ENCOUNTER — POP HEALTH PHARMACY (OUTPATIENT)
Dept: PHARMACY | Facility: OTHER | Age: 75
End: 2025-07-29
Payer: MEDICARE

## 2025-07-29 NOTE — PROGRESS NOTES
Population Health Pharmacy Outreach      Rasheed Arevalo was called today to discuss medication adherence with ROSUVASTATIN CALCIUM ([facility] [employer]) , as he was identified as having care opportunities.    Program Details    [facility]  Status: Enrolled  Effective Dates: 7/29/2025 - present  Responsible Staff: Ramu Watts RPH          Opportunities Identified    Adherence- Cholesterol       Adherence and Medication Management    Adherence Questions   Patient Reported X Missed Doses in the Last 7 Days : 0  Reasons for Non-Adherence : Other - see comments  List other reason(s) for non-adherence or missed doses: pt sometimes travels for multiple weeks at a time. sometimes waits until out to refill medication.  Interested in a 90 day supply? : Yes  Does this require clinical escalation to a pharmacist?: N         General Medication Management    Type of medication management: targeted medication review  Review Completed on: 7/29/25  Recipient: beneficiary  Provider: pharmacist - other  Visit type: initial           Medication Therapy Problems     Medication Therapy Recommendations  No medication therapy recommendations to display      Summary    Spoke w/ Mr. Arevalo today about adherence to rosuvastatin 5mg tabs. Pt can explain necessity of med and says he rarely misses a dose. Sometimes travels for multiple weeks at a time and may go without med for a few days. Discussed benefits of 90 day supply pt prefers. Currently has multiple scripts on file at [facility] and was encourage to request 90 DS for future fills. Has plenty of med on hand and agrees to 90 day follow-up     Medication Management Summary    Topics discussed: adherence and missed doses discussed, reminder to refill or  medication discussed  Time spent: 16-30 min         Ramu Watts RPH, 07/29/25, 5:10 PM EDT.

## 2025-08-21 ENCOUNTER — POP HEALTH PHARMACY (OUTPATIENT)
Dept: PHARMACY | Facility: OTHER | Age: 75
End: 2025-08-21
Payer: MEDICARE

## 2025-08-22 DIAGNOSIS — Z86.73 HISTORY OF TIA (TRANSIENT ISCHEMIC ATTACK): ICD-10-CM

## 2025-08-22 RX ORDER — ROSUVASTATIN CALCIUM 5 MG/1
5 TABLET, COATED ORAL DAILY
Qty: 90 TABLET | Refills: 3 | Status: SHIPPED | OUTPATIENT
Start: 2025-08-22

## (undated) DEVICE — DOVER HYDROGEL COATED LATEX FOLEY CATHETER, 30 ML, 3-WAY 24 FR/CH (8.0 MM): Brand: DOVER

## (undated) DEVICE — TUBING, SUCTION, 1/4" X 12', STRAIGHT: Brand: MEDLINE

## (undated) DEVICE — Device: Brand: AQUABEAM HANDPIECE

## (undated) DEVICE — 4-PORT MANIFOLD: Brand: NEPTUNE 2

## (undated) DEVICE — PK DRP AQUABEAM LITHO 65X69IN

## (undated) DEVICE — EVAC BLDR UROVAC W ADAPT

## (undated) DEVICE — GLV SURG BIOGEL M LTX PF 7 1/2

## (undated) DEVICE — ELECTRD SUPERSECT FRNT LOAD 5PK

## (undated) DEVICE — SYRINGE,PISTON,IRRIGATION,60ML,STERILE: Brand: MEDLINE

## (undated) DEVICE — PK CYSTO 30

## (undated) DEVICE — BHS TURNOVER CYSTO: Brand: MEDLINE INDUSTRIES, INC.

## (undated) DEVICE — COVER,MAYO STAND,STERILE: Brand: MEDLINE

## (undated) DEVICE — BAG,DRAINAGE,4L,A/R TOWER,LL,SLIDE TAP: Brand: MEDLINE